# Patient Record
Sex: FEMALE | Race: WHITE | NOT HISPANIC OR LATINO | Employment: OTHER | URBAN - METROPOLITAN AREA
[De-identification: names, ages, dates, MRNs, and addresses within clinical notes are randomized per-mention and may not be internally consistent; named-entity substitution may affect disease eponyms.]

---

## 2017-02-23 ENCOUNTER — ALLSCRIPTS OFFICE VISIT (OUTPATIENT)
Dept: OTHER | Facility: OTHER | Age: 82
End: 2017-02-23

## 2017-04-19 ENCOUNTER — GENERIC CONVERSION - ENCOUNTER (OUTPATIENT)
Dept: OTHER | Facility: OTHER | Age: 82
End: 2017-04-19

## 2017-04-20 DIAGNOSIS — I48.91 ATRIAL FIBRILLATION (HCC): ICD-10-CM

## 2017-04-20 DIAGNOSIS — Z79.01 LONG TERM CURRENT USE OF ANTICOAGULANT: ICD-10-CM

## 2017-06-28 ENCOUNTER — APPOINTMENT (EMERGENCY)
Dept: ULTRASOUND IMAGING | Facility: HOSPITAL | Age: 82
End: 2017-06-28
Payer: MEDICARE

## 2017-06-28 ENCOUNTER — HOSPITAL ENCOUNTER (EMERGENCY)
Facility: HOSPITAL | Age: 82
End: 2017-06-28
Attending: EMERGENCY MEDICINE | Admitting: EMERGENCY MEDICINE
Payer: MEDICARE

## 2017-06-28 VITALS
DIASTOLIC BLOOD PRESSURE: 112 MMHG | OXYGEN SATURATION: 96 % | HEART RATE: 94 BPM | WEIGHT: 113.76 LBS | HEIGHT: 60 IN | BODY MASS INDEX: 22.33 KG/M2 | TEMPERATURE: 97 F | SYSTOLIC BLOOD PRESSURE: 194 MMHG | RESPIRATION RATE: 16 BRPM

## 2017-06-28 DIAGNOSIS — I48.91 ATRIAL FIBRILLATION WITH RVR (HCC): ICD-10-CM

## 2017-06-28 DIAGNOSIS — I70.209 SUPERFICIAL FEMORAL ARTERY OCCLUSION (HCC): Primary | ICD-10-CM

## 2017-06-28 LAB
ALBUMIN SERPL BCP-MCNC: 4.1 G/DL (ref 3.5–5)
ALP SERPL-CCNC: 85 U/L (ref 46–116)
ALT SERPL W P-5'-P-CCNC: 14 U/L (ref 12–78)
ANION GAP SERPL CALCULATED.3IONS-SCNC: 11 MMOL/L (ref 4–13)
AST SERPL W P-5'-P-CCNC: 16 U/L (ref 5–45)
BASOPHILS # BLD AUTO: 0.06 THOUSANDS/ΜL (ref 0–0.1)
BASOPHILS NFR BLD AUTO: 1 % (ref 0–1)
BILIRUB SERPL-MCNC: 1.3 MG/DL (ref 0.2–1)
BUN SERPL-MCNC: 18 MG/DL (ref 5–25)
CALCIUM SERPL-MCNC: 9.1 MG/DL (ref 8.3–10.1)
CHLORIDE SERPL-SCNC: 105 MMOL/L (ref 100–108)
CO2 SERPL-SCNC: 27 MMOL/L (ref 21–32)
CREAT SERPL-MCNC: 1.38 MG/DL (ref 0.6–1.3)
EOSINOPHIL # BLD AUTO: 0.08 THOUSAND/ΜL (ref 0–0.61)
EOSINOPHIL NFR BLD AUTO: 1 % (ref 0–6)
ERYTHROCYTE [DISTWIDTH] IN BLOOD BY AUTOMATED COUNT: 13.2 % (ref 11.6–15.1)
GFR SERPL CREATININE-BSD FRML MDRD: 35.8 ML/MIN/1.73SQ M
GLUCOSE SERPL-MCNC: 93 MG/DL (ref 65–140)
HCT VFR BLD AUTO: 42.4 % (ref 34.8–46.1)
HGB BLD-MCNC: 14.1 G/DL (ref 11.5–15.4)
INR PPP: 1.06 (ref 0.86–1.16)
LYMPHOCYTES # BLD AUTO: 0.95 THOUSANDS/ΜL (ref 0.6–4.47)
LYMPHOCYTES NFR BLD AUTO: 12 % (ref 14–44)
MCH RBC QN AUTO: 29 PG (ref 26.8–34.3)
MCHC RBC AUTO-ENTMCNC: 33.3 G/DL (ref 31.4–37.4)
MCV RBC AUTO: 87 FL (ref 82–98)
MONOCYTES # BLD AUTO: 0.48 THOUSAND/ΜL (ref 0.17–1.22)
MONOCYTES NFR BLD AUTO: 6 % (ref 4–12)
NEUTROPHILS # BLD AUTO: 6.31 THOUSANDS/ΜL (ref 1.85–7.62)
NEUTS SEG NFR BLD AUTO: 80 % (ref 43–75)
NRBC BLD AUTO-RTO: 0 /100 WBCS
PLATELET # BLD AUTO: 248 THOUSANDS/UL (ref 149–390)
PMV BLD AUTO: 9.9 FL (ref 8.9–12.7)
POTASSIUM SERPL-SCNC: 3.9 MMOL/L (ref 3.5–5.3)
PROT SERPL-MCNC: 6.8 G/DL (ref 6.4–8.2)
PROTHROMBIN TIME: 14 SECONDS (ref 12.1–14.4)
RBC # BLD AUTO: 4.86 MILLION/UL (ref 3.81–5.12)
SODIUM SERPL-SCNC: 143 MMOL/L (ref 136–145)
WBC # BLD AUTO: 7.9 THOUSAND/UL (ref 4.31–10.16)

## 2017-06-28 PROCEDURE — 93923 UPR/LXTR ART STDY 3+ LVLS: CPT

## 2017-06-28 PROCEDURE — 80053 COMPREHEN METABOLIC PANEL: CPT | Performed by: PHYSICIAN ASSISTANT

## 2017-06-28 PROCEDURE — 96374 THER/PROPH/DIAG INJ IV PUSH: CPT

## 2017-06-28 PROCEDURE — 93005 ELECTROCARDIOGRAM TRACING: CPT | Performed by: PHYSICIAN ASSISTANT

## 2017-06-28 PROCEDURE — 85025 COMPLETE CBC W/AUTO DIFF WBC: CPT | Performed by: PHYSICIAN ASSISTANT

## 2017-06-28 PROCEDURE — 85610 PROTHROMBIN TIME: CPT | Performed by: PHYSICIAN ASSISTANT

## 2017-06-28 PROCEDURE — 93925 LOWER EXTREMITY STUDY: CPT

## 2017-06-28 PROCEDURE — 36415 COLL VENOUS BLD VENIPUNCTURE: CPT | Performed by: PHYSICIAN ASSISTANT

## 2017-06-28 PROCEDURE — 99285 EMERGENCY DEPT VISIT HI MDM: CPT

## 2017-06-28 RX ORDER — VALSARTAN 320 MG/1
TABLET ORAL
COMMUNITY
End: 2018-06-22 | Stop reason: HOSPADM

## 2017-06-28 RX ORDER — LORAZEPAM 1 MG/1
2 TABLET ORAL ONCE
Status: COMPLETED | OUTPATIENT
Start: 2017-06-28 | End: 2017-06-28

## 2017-06-28 RX ORDER — AMLODIPINE BESYLATE 5 MG/1
TABLET ORAL
COMMUNITY
End: 2017-07-03 | Stop reason: HOSPADM

## 2017-06-28 RX ORDER — ATENOLOL 50 MG/1
TABLET ORAL
COMMUNITY
Start: 2017-06-07 | End: 2018-06-22 | Stop reason: HOSPADM

## 2017-06-28 RX ORDER — WARFARIN SODIUM 3 MG/1
TABLET ORAL
COMMUNITY
Start: 2017-02-23 | End: 2017-07-03 | Stop reason: HOSPADM

## 2017-06-28 RX ADMIN — LORAZEPAM 1 MG: 1 TABLET ORAL at 15:40

## 2017-06-28 RX ADMIN — METOPROLOL TARTRATE 5 MG: 5 INJECTION, SOLUTION INTRAVENOUS at 16:26

## 2017-06-29 ENCOUNTER — APPOINTMENT (INPATIENT)
Dept: RADIOLOGY | Facility: HOSPITAL | Age: 82
DRG: 271 | End: 2017-06-29
Attending: SURGERY
Payer: MEDICARE

## 2017-06-29 ENCOUNTER — ANESTHESIA (INPATIENT)
Dept: RADIOLOGY | Facility: HOSPITAL | Age: 82
DRG: 271 | End: 2017-06-29
Payer: MEDICARE

## 2017-06-29 ENCOUNTER — HOSPITAL ENCOUNTER (INPATIENT)
Facility: HOSPITAL | Age: 82
LOS: 4 days | Discharge: NURSING, REHAB OR LONG-TERM CARE | DRG: 271 | End: 2017-07-03
Attending: INTERNAL MEDICINE | Admitting: HOSPITALIST
Payer: MEDICARE

## 2017-06-29 ENCOUNTER — ANESTHESIA EVENT (INPATIENT)
Dept: RADIOLOGY | Facility: HOSPITAL | Age: 82
DRG: 271 | End: 2017-06-29
Payer: MEDICARE

## 2017-06-29 DIAGNOSIS — G30.9 DEMENTIA, ALZHEIMER'S, WITH BEHAVIOR DISTURBANCE (HCC): ICD-10-CM

## 2017-06-29 DIAGNOSIS — F02.81 DEMENTIA, ALZHEIMER'S, WITH BEHAVIOR DISTURBANCE (HCC): ICD-10-CM

## 2017-06-29 DIAGNOSIS — I70.209 OCCLUSION OF ARTERY OF LOWER EXTREMITY (HCC): Primary | ICD-10-CM

## 2017-06-29 PROBLEM — I70.229 CRITICAL LOWER LIMB ISCHEMIA (HCC): Status: ACTIVE | Noted: 2017-06-29

## 2017-06-29 PROBLEM — R53.81 PHYSICAL DECONDITIONING: Status: ACTIVE | Noted: 2017-06-29

## 2017-06-29 PROBLEM — I48.91 ATRIAL FIBRILLATION (HCC): Status: ACTIVE | Noted: 2017-06-29

## 2017-06-29 PROBLEM — H91.90 HOH (HARD OF HEARING): Status: ACTIVE | Noted: 2017-06-29

## 2017-06-29 PROBLEM — N17.9 AKI (ACUTE KIDNEY INJURY) (HCC): Status: ACTIVE | Noted: 2017-06-29

## 2017-06-29 PROBLEM — R41.0 DELIRIUM: Status: ACTIVE | Noted: 2017-06-29

## 2017-06-29 PROBLEM — I10 ACCELERATED HYPERTENSION: Status: ACTIVE | Noted: 2017-06-29

## 2017-06-29 PROBLEM — I70.90 ARTERIAL OCCLUSION: Status: ACTIVE | Noted: 2017-06-29

## 2017-06-29 PROBLEM — Z91.19 NONCOMPLIANCE WITH TREATMENT: Status: ACTIVE | Noted: 2017-06-29
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
## 2017-06-29 LAB
ANION GAP SERPL CALCULATED.3IONS-SCNC: 7 MMOL/L (ref 4–13)
APTT PPP: 28 SECONDS (ref 23–35)
APTT PPP: 33 SECONDS (ref 23–35)
APTT PPP: 64 SECONDS (ref 23–35)
APTT PPP: 68 SECONDS (ref 23–35)
ATRIAL RATE: 170 BPM
BUN SERPL-MCNC: 18 MG/DL (ref 5–25)
CALCIUM SERPL-MCNC: 8.5 MG/DL (ref 8.3–10.1)
CHLORIDE SERPL-SCNC: 107 MMOL/L (ref 100–108)
CO2 SERPL-SCNC: 28 MMOL/L (ref 21–32)
CREAT SERPL-MCNC: 1.13 MG/DL (ref 0.6–1.3)
ERYTHROCYTE [DISTWIDTH] IN BLOOD BY AUTOMATED COUNT: 13.5 % (ref 11.6–15.1)
GFR SERPL CREATININE-BSD FRML MDRD: 45.1 ML/MIN/1.73SQ M
GLUCOSE SERPL-MCNC: 93 MG/DL (ref 65–140)
HCT VFR BLD AUTO: 40.9 % (ref 34.8–46.1)
HGB BLD-MCNC: 13.6 G/DL (ref 11.5–15.4)
INR PPP: 1.16 (ref 0.86–1.16)
MCH RBC QN AUTO: 29.2 PG (ref 26.8–34.3)
MCHC RBC AUTO-ENTMCNC: 33.3 G/DL (ref 31.4–37.4)
MCV RBC AUTO: 88 FL (ref 82–98)
PLATELET # BLD AUTO: 209 THOUSANDS/UL (ref 149–390)
PMV BLD AUTO: 10.4 FL (ref 8.9–12.7)
POTASSIUM SERPL-SCNC: 3.3 MMOL/L (ref 3.5–5.3)
PROTHROMBIN TIME: 14.8 SECONDS (ref 12.1–14.4)
QRS AXIS: -35 DEGREES
QRSD INTERVAL: 76 MS
QT INTERVAL: 354 MS
QTC INTERVAL: 485 MS
RBC # BLD AUTO: 4.65 MILLION/UL (ref 3.81–5.12)
SODIUM SERPL-SCNC: 142 MMOL/L (ref 136–145)
T WAVE AXIS: 48 DEGREES
VENTRICULAR RATE: 113 BPM
WBC # BLD AUTO: 7.13 THOUSAND/UL (ref 4.31–10.16)

## 2017-06-29 PROCEDURE — 37224 HB FEM/POPL REVAS W/TLA: CPT

## 2017-06-29 PROCEDURE — B41CYZZ FLUOROSCOPY OF PELVIC ARTERIES USING OTHER CONTRAST: ICD-10-PCS | Performed by: RADIOLOGY

## 2017-06-29 PROCEDURE — C1769 GUIDE WIRE: HCPCS

## 2017-06-29 PROCEDURE — 85730 THROMBOPLASTIN TIME PARTIAL: CPT | Performed by: SURGERY

## 2017-06-29 PROCEDURE — 75625 CONTRAST EXAM ABDOMINL AORTA: CPT

## 2017-06-29 PROCEDURE — 047L3ZZ DILATION OF LEFT FEMORAL ARTERY, PERCUTANEOUS APPROACH: ICD-10-PCS | Performed by: RADIOLOGY

## 2017-06-29 PROCEDURE — 99152 MOD SED SAME PHYS/QHP 5/>YRS: CPT

## 2017-06-29 PROCEDURE — 99153 MOD SED SAME PHYS/QHP EA: CPT

## 2017-06-29 PROCEDURE — C1894 INTRO/SHEATH, NON-LASER: HCPCS

## 2017-06-29 PROCEDURE — 85610 PROTHROMBIN TIME: CPT | Performed by: SURGERY

## 2017-06-29 PROCEDURE — 37184 PRIM ART M-THRMBC 1ST VSL: CPT

## 2017-06-29 PROCEDURE — 75710 ARTERY X-RAYS ARM/LEG: CPT

## 2017-06-29 PROCEDURE — 85027 COMPLETE CBC AUTOMATED: CPT | Performed by: SURGERY

## 2017-06-29 PROCEDURE — C1884 EMBOLIZATION PROTECT SYST: HCPCS

## 2017-06-29 PROCEDURE — 80048 BASIC METABOLIC PNL TOTAL CA: CPT | Performed by: INTERNAL MEDICINE

## 2017-06-29 PROCEDURE — B410YZZ FLUOROSCOPY OF ABDOMINAL AORTA USING OTHER CONTRAST: ICD-10-PCS | Performed by: RADIOLOGY

## 2017-06-29 PROCEDURE — 047N3ZZ DILATION OF LEFT POPLITEAL ARTERY, PERCUTANEOUS APPROACH: ICD-10-PCS | Performed by: RADIOLOGY

## 2017-06-29 PROCEDURE — 04CN3ZZ EXTIRPATION OF MATTER FROM LEFT POPLITEAL ARTERY, PERCUTANEOUS APPROACH: ICD-10-PCS | Performed by: RADIOLOGY

## 2017-06-29 PROCEDURE — C1725 CATH, TRANSLUMIN NON-LASER: HCPCS

## 2017-06-29 PROCEDURE — C1757 CATH, THROMBECTOMY/EMBOLECT: HCPCS

## 2017-06-29 PROCEDURE — B41GYZZ FLUOROSCOPY OF LEFT LOWER EXTREMITY ARTERIES USING OTHER CONTRAST: ICD-10-PCS | Performed by: RADIOLOGY

## 2017-06-29 PROCEDURE — 04CL3ZZ EXTIRPATION OF MATTER FROM LEFT FEMORAL ARTERY, PERCUTANEOUS APPROACH: ICD-10-PCS | Performed by: RADIOLOGY

## 2017-06-29 RX ORDER — ACETAMINOPHEN 325 MG/1
650 TABLET ORAL EVERY 6 HOURS PRN
Status: DISCONTINUED | OUTPATIENT
Start: 2017-06-29 | End: 2017-06-29

## 2017-06-29 RX ORDER — OXYCODONE HYDROCHLORIDE 5 MG/1
5 TABLET ORAL EVERY 6 HOURS PRN
Status: DISCONTINUED | OUTPATIENT
Start: 2017-06-29 | End: 2017-07-03 | Stop reason: HOSPADM

## 2017-06-29 RX ORDER — HALOPERIDOL 5 MG/ML
1 INJECTION INTRAMUSCULAR ONCE
Status: COMPLETED | OUTPATIENT
Start: 2017-06-29 | End: 2017-06-30

## 2017-06-29 RX ORDER — PROPOFOL 10 MG/ML
INJECTION, EMULSION INTRAVENOUS CONTINUOUS PRN
Status: DISCONTINUED | OUTPATIENT
Start: 2017-06-29 | End: 2017-06-29 | Stop reason: SURG

## 2017-06-29 RX ORDER — PROPOFOL 10 MG/ML
INJECTION, EMULSION INTRAVENOUS AS NEEDED
Status: DISCONTINUED | OUTPATIENT
Start: 2017-06-29 | End: 2017-06-29 | Stop reason: SURG

## 2017-06-29 RX ORDER — ACETAMINOPHEN 325 MG/1
975 TABLET ORAL EVERY 8 HOURS SCHEDULED
Status: DISCONTINUED | OUTPATIENT
Start: 2017-06-29 | End: 2017-07-03 | Stop reason: HOSPADM

## 2017-06-29 RX ORDER — HEPARIN SODIUM 1000 [USP'U]/ML
3000 INJECTION, SOLUTION INTRAVENOUS; SUBCUTANEOUS AS NEEDED
Status: DISCONTINUED | OUTPATIENT
Start: 2017-06-29 | End: 2017-07-02

## 2017-06-29 RX ORDER — HEPARIN SODIUM 5000 [USP'U]/ML
5000 INJECTION, SOLUTION INTRAVENOUS; SUBCUTANEOUS EVERY 8 HOURS SCHEDULED
Status: DISCONTINUED | OUTPATIENT
Start: 2017-06-29 | End: 2017-06-29

## 2017-06-29 RX ORDER — POTASSIUM CHLORIDE 14.9 MG/ML
20 INJECTION INTRAVENOUS ONCE
Status: COMPLETED | OUTPATIENT
Start: 2017-06-29 | End: 2017-06-29

## 2017-06-29 RX ORDER — TRAMADOL HYDROCHLORIDE 50 MG/1
50 TABLET ORAL 2 TIMES DAILY PRN
Status: DISCONTINUED | OUTPATIENT
Start: 2017-06-29 | End: 2017-06-29

## 2017-06-29 RX ORDER — LABETALOL HYDROCHLORIDE 5 MG/ML
INJECTION, SOLUTION INTRAVENOUS AS NEEDED
Status: DISCONTINUED | OUTPATIENT
Start: 2017-06-29 | End: 2017-06-29 | Stop reason: SURG

## 2017-06-29 RX ORDER — HALOPERIDOL 5 MG/ML
1 INJECTION INTRAMUSCULAR ONCE
Status: COMPLETED | OUTPATIENT
Start: 2017-06-29 | End: 2017-06-29

## 2017-06-29 RX ORDER — HEPARIN SODIUM 1000 [USP'U]/ML
1500 INJECTION, SOLUTION INTRAVENOUS; SUBCUTANEOUS AS NEEDED
Status: DISCONTINUED | OUTPATIENT
Start: 2017-06-29 | End: 2017-07-02

## 2017-06-29 RX ORDER — HEPARIN SODIUM 10000 [USP'U]/100ML
3-20 INJECTION, SOLUTION INTRAVENOUS
Status: DISCONTINUED | OUTPATIENT
Start: 2017-06-29 | End: 2017-07-02

## 2017-06-29 RX ORDER — OLANZAPINE 10 MG/1
2.5 INJECTION, POWDER, LYOPHILIZED, FOR SOLUTION INTRAMUSCULAR EVERY 8 HOURS PRN
Status: DISCONTINUED | OUTPATIENT
Start: 2017-06-29 | End: 2017-07-03 | Stop reason: HOSPADM

## 2017-06-29 RX ORDER — 0.9 % SODIUM CHLORIDE 0.9 %
VIAL (ML) INJECTION
Status: DISCONTINUED
Start: 2017-06-29 | End: 2017-06-29 | Stop reason: WASHOUT

## 2017-06-29 RX ORDER — SODIUM CHLORIDE 9 MG/ML
125 INJECTION, SOLUTION INTRAVENOUS CONTINUOUS
Status: DISCONTINUED | OUTPATIENT
Start: 2017-06-29 | End: 2017-06-29

## 2017-06-29 RX ORDER — HALOPERIDOL 5 MG/ML
0.5 INJECTION INTRAMUSCULAR ONCE
Status: COMPLETED | OUTPATIENT
Start: 2017-06-29 | End: 2017-06-29

## 2017-06-29 RX ORDER — HALOPERIDOL 5 MG/ML
1 INJECTION INTRAMUSCULAR ONCE
Status: DISCONTINUED | OUTPATIENT
Start: 2017-06-29 | End: 2017-07-03 | Stop reason: HOSPADM

## 2017-06-29 RX ORDER — HYDRALAZINE HYDROCHLORIDE 20 MG/ML
5 INJECTION INTRAMUSCULAR; INTRAVENOUS EVERY 6 HOURS PRN
Status: DISCONTINUED | OUTPATIENT
Start: 2017-06-29 | End: 2017-07-03 | Stop reason: HOSPADM

## 2017-06-29 RX ORDER — SODIUM CHLORIDE 9 MG/ML
75 INJECTION, SOLUTION INTRAVENOUS CONTINUOUS
Status: DISCONTINUED | OUTPATIENT
Start: 2017-06-29 | End: 2017-06-30

## 2017-06-29 RX ORDER — HALOPERIDOL 5 MG/ML
INJECTION INTRAMUSCULAR
Status: COMPLETED
Start: 2017-06-29 | End: 2017-06-29

## 2017-06-29 RX ORDER — DOCUSATE SODIUM 100 MG/1
100 CAPSULE, LIQUID FILLED ORAL 2 TIMES DAILY
Status: DISCONTINUED | OUTPATIENT
Start: 2017-06-29 | End: 2017-07-03 | Stop reason: HOSPADM

## 2017-06-29 RX ORDER — SODIUM CHLORIDE 9 MG/ML
INJECTION, SOLUTION INTRAVENOUS CONTINUOUS PRN
Status: DISCONTINUED | OUTPATIENT
Start: 2017-06-29 | End: 2017-06-29 | Stop reason: SURG

## 2017-06-29 RX ORDER — AMLODIPINE BESYLATE 5 MG/1
5 TABLET ORAL DAILY
Status: DISCONTINUED | OUTPATIENT
Start: 2017-06-29 | End: 2017-06-30

## 2017-06-29 RX ORDER — ATENOLOL 50 MG/1
50 TABLET ORAL DAILY
Status: DISCONTINUED | OUTPATIENT
Start: 2017-06-29 | End: 2017-07-03 | Stop reason: HOSPADM

## 2017-06-29 RX ORDER — ONDANSETRON 2 MG/ML
4 INJECTION INTRAMUSCULAR; INTRAVENOUS EVERY 6 HOURS PRN
Status: DISCONTINUED | OUTPATIENT
Start: 2017-06-29 | End: 2017-07-03 | Stop reason: HOSPADM

## 2017-06-29 RX ORDER — VALSARTAN 160 MG/1
320 TABLET ORAL DAILY
Status: DISCONTINUED | OUTPATIENT
Start: 2017-06-29 | End: 2017-06-29

## 2017-06-29 RX ORDER — SODIUM CHLORIDE 9 MG/ML
75 INJECTION, SOLUTION INTRAVENOUS CONTINUOUS
Status: DISCONTINUED | OUTPATIENT
Start: 2017-06-29 | End: 2017-06-29 | Stop reason: SDUPTHER

## 2017-06-29 RX ORDER — SENNOSIDES 8.6 MG
1 TABLET ORAL DAILY
Status: DISCONTINUED | OUTPATIENT
Start: 2017-06-29 | End: 2017-07-03 | Stop reason: HOSPADM

## 2017-06-29 RX ORDER — LABETALOL HYDROCHLORIDE 5 MG/ML
10 INJECTION, SOLUTION INTRAVENOUS EVERY 4 HOURS PRN
Status: DISCONTINUED | OUTPATIENT
Start: 2017-06-29 | End: 2017-07-03 | Stop reason: HOSPADM

## 2017-06-29 RX ADMIN — LABETALOL HYDROCHLORIDE 10 MG: 5 INJECTION, SOLUTION INTRAVENOUS at 05:05

## 2017-06-29 RX ADMIN — PROPOFOL 50 MG: 10 INJECTION, EMULSION INTRAVENOUS at 11:43

## 2017-06-29 RX ADMIN — LABETALOL HYDROCHLORIDE 10 MG: 5 INJECTION, SOLUTION INTRAVENOUS at 13:09

## 2017-06-29 RX ADMIN — METOPROLOL TARTRATE 5 MG: 5 INJECTION INTRAVENOUS at 16:02

## 2017-06-29 RX ADMIN — HALOPERIDOL LACTATE 1 MG: 5 INJECTION, SOLUTION INTRAMUSCULAR at 04:47

## 2017-06-29 RX ADMIN — SODIUM CHLORIDE 75 ML/HR: 0.9 INJECTION, SOLUTION INTRAVENOUS at 09:21

## 2017-06-29 RX ADMIN — LABETALOL HYDROCHLORIDE 10 MG: 5 INJECTION, SOLUTION INTRAVENOUS at 18:42

## 2017-06-29 RX ADMIN — HALOPERIDOL LACTATE 1 MG: 5 INJECTION, SOLUTION INTRAMUSCULAR at 06:05

## 2017-06-29 RX ADMIN — LABETALOL HYDROCHLORIDE 5 MG: 5 INJECTION, SOLUTION INTRAVENOUS at 13:16

## 2017-06-29 RX ADMIN — LABETALOL HYDROCHLORIDE 2.5 MG: 5 INJECTION, SOLUTION INTRAVENOUS at 12:35

## 2017-06-29 RX ADMIN — METOPROLOL TARTRATE 5 MG: 5 INJECTION INTRAVENOUS at 07:21

## 2017-06-29 RX ADMIN — PROPOFOL 40 MCG/KG/MIN: 10 INJECTION, EMULSION INTRAVENOUS at 11:43

## 2017-06-29 RX ADMIN — WATER: 1 INJECTION INTRAMUSCULAR; INTRAVENOUS; SUBCUTANEOUS at 19:55

## 2017-06-29 RX ADMIN — LABETALOL HYDROCHLORIDE 5 MG: 5 INJECTION, SOLUTION INTRAVENOUS at 12:49

## 2017-06-29 RX ADMIN — SODIUM CHLORIDE 125 ML/HR: 0.9 INJECTION, SOLUTION INTRAVENOUS at 02:51

## 2017-06-29 RX ADMIN — HALOPERIDOL LACTATE 0.5 MG: 5 INJECTION, SOLUTION INTRAMUSCULAR at 04:14

## 2017-06-29 RX ADMIN — HEPARIN SODIUM 3000 UNITS: 1000 INJECTION, SOLUTION INTRAVENOUS; SUBCUTANEOUS at 09:38

## 2017-06-29 RX ADMIN — LABETALOL HYDROCHLORIDE 10 MG: 5 INJECTION, SOLUTION INTRAVENOUS at 01:16

## 2017-06-29 RX ADMIN — SODIUM CHLORIDE 75 ML/HR: 0.9 INJECTION, SOLUTION INTRAVENOUS at 22:59

## 2017-06-29 RX ADMIN — HYDRALAZINE HYDROCHLORIDE 5 MG: 20 INJECTION INTRAMUSCULAR; INTRAVENOUS at 14:45

## 2017-06-29 RX ADMIN — ACETAMINOPHEN 975 MG: 325 TABLET, FILM COATED ORAL at 22:06

## 2017-06-29 RX ADMIN — SODIUM CHLORIDE: 0.9 INJECTION, SOLUTION INTRAVENOUS at 11:50

## 2017-06-29 RX ADMIN — AMLODIPINE BESYLATE 5 MG: 5 TABLET ORAL at 09:04

## 2017-06-29 RX ADMIN — OLANZAPINE 2.5 MG: 10 INJECTION, POWDER, LYOPHILIZED, FOR SOLUTION INTRAMUSCULAR at 19:09

## 2017-06-29 RX ADMIN — POTASSIUM CHLORIDE 20 MEQ: 200 INJECTION, SOLUTION INTRAVENOUS at 09:20

## 2017-06-29 RX ADMIN — LABETALOL HYDROCHLORIDE 10 MG: 5 INJECTION, SOLUTION INTRAVENOUS at 12:11

## 2017-06-29 RX ADMIN — HALOPERIDOL 1 MG: 5 INJECTION INTRAMUSCULAR at 06:05

## 2017-06-29 RX ADMIN — HALOPERIDOL 1 MG: 5 INJECTION INTRAMUSCULAR at 04:47

## 2017-06-29 RX ADMIN — LABETALOL HYDROCHLORIDE 5 MG: 5 INJECTION, SOLUTION INTRAVENOUS at 12:00

## 2017-06-29 RX ADMIN — LABETALOL HYDROCHLORIDE 5 MG: 5 INJECTION, SOLUTION INTRAVENOUS at 12:57

## 2017-06-29 RX ADMIN — ATENOLOL 50 MG: 50 TABLET ORAL at 09:04

## 2017-06-29 RX ADMIN — DOCUSATE SODIUM 100 MG: 100 CAPSULE, LIQUID FILLED ORAL at 09:04

## 2017-06-29 RX ADMIN — IODIXANOL 55 ML: 320 INJECTION, SOLUTION INTRAVASCULAR at 14:51

## 2017-06-29 RX ADMIN — HEPARIN SODIUM 12 UNITS/KG/HR: 10000 INJECTION, SOLUTION INTRAVENOUS at 02:28

## 2017-06-29 RX ADMIN — SODIUM CHLORIDE 75 ML/HR: 0.9 INJECTION, SOLUTION INTRAVENOUS at 01:37

## 2017-06-30 ENCOUNTER — APPOINTMENT (INPATIENT)
Dept: PHYSICAL THERAPY | Facility: HOSPITAL | Age: 82
DRG: 271 | End: 2017-06-30
Payer: MEDICARE

## 2017-06-30 LAB
ALBUMIN SERPL BCP-MCNC: 3.7 G/DL (ref 3.5–5)
ALP SERPL-CCNC: 82 U/L (ref 46–116)
ALT SERPL W P-5'-P-CCNC: 22 U/L (ref 12–78)
ANION GAP SERPL CALCULATED.3IONS-SCNC: 11 MMOL/L (ref 4–13)
APTT PPP: 62 SECONDS (ref 23–35)
AST SERPL W P-5'-P-CCNC: 44 U/L (ref 5–45)
BILIRUB DIRECT SERPL-MCNC: 0.61 MG/DL (ref 0–0.2)
BILIRUB SERPL-MCNC: 1.61 MG/DL (ref 0.2–1)
BUN SERPL-MCNC: 16 MG/DL (ref 5–25)
CALCIUM SERPL-MCNC: 8.7 MG/DL (ref 8.3–10.1)
CHLORIDE SERPL-SCNC: 109 MMOL/L (ref 100–108)
CO2 SERPL-SCNC: 23 MMOL/L (ref 21–32)
CREAT SERPL-MCNC: 1.18 MG/DL (ref 0.6–1.3)
FOLATE SERPL-MCNC: 16.3 NG/ML (ref 3.1–17.5)
GFR SERPL CREATININE-BSD FRML MDRD: 42.9 ML/MIN/1.73SQ M
GLUCOSE SERPL-MCNC: 105 MG/DL (ref 65–140)
INR PPP: 1.11 (ref 0.86–1.16)
POTASSIUM SERPL-SCNC: 3.9 MMOL/L (ref 3.5–5.3)
PROT SERPL-MCNC: 6.7 G/DL (ref 6.4–8.2)
PROTHROMBIN TIME: 14.3 SECONDS (ref 12.1–14.4)
SODIUM SERPL-SCNC: 143 MMOL/L (ref 136–145)
TSH SERPL DL<=0.05 MIU/L-ACNC: 2.25 UIU/ML (ref 0.36–3.74)
VIT B12 SERPL-MCNC: 299 PG/ML (ref 100–900)

## 2017-06-30 PROCEDURE — 84443 ASSAY THYROID STIM HORMONE: CPT | Performed by: PHYSICIAN ASSISTANT

## 2017-06-30 PROCEDURE — 97167 OT EVAL HIGH COMPLEX 60 MIN: CPT

## 2017-06-30 PROCEDURE — 82607 VITAMIN B-12: CPT | Performed by: PHYSICIAN ASSISTANT

## 2017-06-30 PROCEDURE — 82746 ASSAY OF FOLIC ACID SERUM: CPT | Performed by: PHYSICIAN ASSISTANT

## 2017-06-30 PROCEDURE — 80076 HEPATIC FUNCTION PANEL: CPT | Performed by: PHYSICIAN ASSISTANT

## 2017-06-30 PROCEDURE — 80048 BASIC METABOLIC PNL TOTAL CA: CPT | Performed by: INTERNAL MEDICINE

## 2017-06-30 PROCEDURE — 97163 PT EVAL HIGH COMPLEX 45 MIN: CPT

## 2017-06-30 PROCEDURE — 85610 PROTHROMBIN TIME: CPT | Performed by: HOSPITALIST

## 2017-06-30 PROCEDURE — G8979 MOBILITY GOAL STATUS: HCPCS

## 2017-06-30 PROCEDURE — 85730 THROMBOPLASTIN TIME PARTIAL: CPT | Performed by: SURGERY

## 2017-06-30 PROCEDURE — G8978 MOBILITY CURRENT STATUS: HCPCS

## 2017-06-30 PROCEDURE — G8987 SELF CARE CURRENT STATUS: HCPCS

## 2017-06-30 PROCEDURE — G8988 SELF CARE GOAL STATUS: HCPCS

## 2017-06-30 RX ORDER — WARFARIN SODIUM 5 MG/1
5 TABLET ORAL
Status: DISCONTINUED | OUTPATIENT
Start: 2017-06-30 | End: 2017-07-03

## 2017-06-30 RX ORDER — AMLODIPINE BESYLATE 10 MG/1
10 TABLET ORAL DAILY
Status: DISCONTINUED | OUTPATIENT
Start: 2017-07-01 | End: 2017-07-03 | Stop reason: HOSPADM

## 2017-06-30 RX ADMIN — AMLODIPINE BESYLATE 5 MG: 5 TABLET ORAL at 07:53

## 2017-06-30 RX ADMIN — HEPARIN SODIUM 16 UNITS/KG/HR: 10000 INJECTION, SOLUTION INTRAVENOUS at 03:26

## 2017-06-30 RX ADMIN — HYDRALAZINE HYDROCHLORIDE 5 MG: 20 INJECTION INTRAMUSCULAR; INTRAVENOUS at 06:16

## 2017-06-30 RX ADMIN — HALOPERIDOL LACTATE 1 MG: 5 INJECTION, SOLUTION INTRAMUSCULAR at 00:30

## 2017-06-30 RX ADMIN — WARFARIN SODIUM 5 MG: 5 TABLET ORAL at 17:55

## 2017-06-30 RX ADMIN — SODIUM CHLORIDE 500 ML: 0.9 INJECTION, SOLUTION INTRAVENOUS at 20:08

## 2017-06-30 RX ADMIN — ACETAMINOPHEN 975 MG: 325 TABLET, FILM COATED ORAL at 07:54

## 2017-06-30 RX ADMIN — ATENOLOL 50 MG: 50 TABLET ORAL at 07:53

## 2017-06-30 RX ADMIN — DOCUSATE SODIUM 100 MG: 100 CAPSULE, LIQUID FILLED ORAL at 07:53

## 2017-06-30 RX ADMIN — SENNOSIDES 8.6 MG: 8.6 TABLET, FILM COATED ORAL at 07:54

## 2017-07-01 LAB — APTT PPP: 74 SECONDS (ref 23–35)

## 2017-07-01 PROCEDURE — 85730 THROMBOPLASTIN TIME PARTIAL: CPT | Performed by: INTERNAL MEDICINE

## 2017-07-01 PROCEDURE — 97532 HB COGNITIVE SKILLS DEVELOPMENT: CPT

## 2017-07-01 RX ORDER — CHOLECALCIFEROL (VITAMIN D3) 125 MCG
500 CAPSULE ORAL DAILY
Status: DISCONTINUED | OUTPATIENT
Start: 2017-07-01 | End: 2017-07-03 | Stop reason: HOSPADM

## 2017-07-01 RX ADMIN — CYANOCOBALAMIN TAB 500 MCG 500 MCG: 500 TAB at 10:57

## 2017-07-01 RX ADMIN — AMLODIPINE BESYLATE 10 MG: 10 TABLET ORAL at 08:36

## 2017-07-01 RX ADMIN — ATENOLOL 50 MG: 50 TABLET ORAL at 08:36

## 2017-07-01 RX ADMIN — ACETAMINOPHEN 975 MG: 325 TABLET, FILM COATED ORAL at 06:22

## 2017-07-01 RX ADMIN — HEPARIN SODIUM 16 UNITS/KG/HR: 10000 INJECTION, SOLUTION INTRAVENOUS at 11:03

## 2017-07-01 RX ADMIN — WARFARIN SODIUM 5 MG: 5 TABLET ORAL at 19:17

## 2017-07-01 RX ADMIN — HYDRALAZINE HYDROCHLORIDE 5 MG: 20 INJECTION INTRAMUSCULAR; INTRAVENOUS at 06:27

## 2017-07-02 LAB
APTT PPP: 84 SECONDS (ref 23–35)
BASOPHILS # BLD AUTO: 0.02 THOUSANDS/ΜL (ref 0–0.1)
BASOPHILS NFR BLD AUTO: 0 % (ref 0–1)
EOSINOPHIL # BLD AUTO: 0.16 THOUSAND/ΜL (ref 0–0.61)
EOSINOPHIL NFR BLD AUTO: 3 % (ref 0–6)
ERYTHROCYTE [DISTWIDTH] IN BLOOD BY AUTOMATED COUNT: 14.2 % (ref 11.6–15.1)
HCT VFR BLD AUTO: 34.2 % (ref 34.8–46.1)
HGB BLD-MCNC: 11.3 G/DL (ref 11.5–15.4)
INR PPP: 1.9 (ref 0.86–1.16)
LYMPHOCYTES # BLD AUTO: 0.77 THOUSANDS/ΜL (ref 0.6–4.47)
LYMPHOCYTES NFR BLD AUTO: 13 % (ref 14–44)
MCH RBC QN AUTO: 29.4 PG (ref 26.8–34.3)
MCHC RBC AUTO-ENTMCNC: 33 G/DL (ref 31.4–37.4)
MCV RBC AUTO: 89 FL (ref 82–98)
MONOCYTES # BLD AUTO: 0.55 THOUSAND/ΜL (ref 0.17–1.22)
MONOCYTES NFR BLD AUTO: 9 % (ref 4–12)
NEUTROPHILS # BLD AUTO: 4.51 THOUSANDS/ΜL (ref 1.85–7.62)
NEUTS SEG NFR BLD AUTO: 75 % (ref 43–75)
NRBC BLD AUTO-RTO: 0 /100 WBCS
PLATELET # BLD AUTO: 195 THOUSANDS/UL (ref 149–390)
PMV BLD AUTO: 10.5 FL (ref 8.9–12.7)
PROTHROMBIN TIME: 22 SECONDS (ref 12.1–14.4)
RBC # BLD AUTO: 3.85 MILLION/UL (ref 3.81–5.12)
WBC # BLD AUTO: 6.03 THOUSAND/UL (ref 4.31–10.16)

## 2017-07-02 PROCEDURE — 97116 GAIT TRAINING THERAPY: CPT

## 2017-07-02 PROCEDURE — 92610 EVALUATE SWALLOWING FUNCTION: CPT

## 2017-07-02 PROCEDURE — 85610 PROTHROMBIN TIME: CPT | Performed by: PHYSICIAN ASSISTANT

## 2017-07-02 PROCEDURE — 85025 COMPLETE CBC W/AUTO DIFF WBC: CPT | Performed by: PHYSICIAN ASSISTANT

## 2017-07-02 PROCEDURE — 85730 THROMBOPLASTIN TIME PARTIAL: CPT | Performed by: INTERNAL MEDICINE

## 2017-07-02 RX ORDER — OLANZAPINE 10 MG/1
2.5 INJECTION, POWDER, LYOPHILIZED, FOR SOLUTION INTRAMUSCULAR ONCE
Status: COMPLETED | OUTPATIENT
Start: 2017-07-02 | End: 2017-07-02

## 2017-07-02 RX ADMIN — AMLODIPINE BESYLATE 10 MG: 10 TABLET ORAL at 08:36

## 2017-07-02 RX ADMIN — DOCUSATE SODIUM 100 MG: 100 CAPSULE, LIQUID FILLED ORAL at 17:17

## 2017-07-02 RX ADMIN — ATENOLOL 50 MG: 50 TABLET ORAL at 08:36

## 2017-07-02 RX ADMIN — OLANZAPINE 2.5 MG: 10 INJECTION, POWDER, LYOPHILIZED, FOR SOLUTION INTRAMUSCULAR at 15:49

## 2017-07-02 RX ADMIN — WATER 10 ML: 1 INJECTION INTRAMUSCULAR; INTRAVENOUS; SUBCUTANEOUS at 22:11

## 2017-07-02 RX ADMIN — HEPARIN SODIUM 16 UNITS/KG/HR: 10000 INJECTION, SOLUTION INTRAVENOUS at 05:18

## 2017-07-02 RX ADMIN — ACETAMINOPHEN 975 MG: 325 TABLET, FILM COATED ORAL at 13:38

## 2017-07-02 RX ADMIN — CYANOCOBALAMIN TAB 500 MCG 500 MCG: 500 TAB at 08:36

## 2017-07-02 RX ADMIN — ACETAMINOPHEN 975 MG: 325 TABLET, FILM COATED ORAL at 21:01

## 2017-07-02 RX ADMIN — OLANZAPINE 2.5 MG: 10 INJECTION, POWDER, LYOPHILIZED, FOR SOLUTION INTRAMUSCULAR at 22:11

## 2017-07-02 RX ADMIN — WARFARIN SODIUM 5 MG: 5 TABLET ORAL at 17:17

## 2017-07-02 RX ADMIN — OXYCODONE HYDROCHLORIDE 5 MG: 5 TABLET ORAL at 17:17

## 2017-07-03 ENCOUNTER — GENERIC CONVERSION - ENCOUNTER (OUTPATIENT)
Dept: OTHER | Facility: OTHER | Age: 82
End: 2017-07-03

## 2017-07-03 VITALS
OXYGEN SATURATION: 98 % | WEIGHT: 116.4 LBS | SYSTOLIC BLOOD PRESSURE: 154 MMHG | HEART RATE: 89 BPM | TEMPERATURE: 97.9 F | HEIGHT: 62 IN | BODY MASS INDEX: 21.42 KG/M2 | DIASTOLIC BLOOD PRESSURE: 81 MMHG | RESPIRATION RATE: 20 BRPM

## 2017-07-03 PROBLEM — I10 ACCELERATED HYPERTENSION: Status: RESOLVED | Noted: 2017-06-29 | Resolved: 2017-07-03

## 2017-07-03 PROBLEM — N17.9 AKI (ACUTE KIDNEY INJURY) (HCC): Status: RESOLVED | Noted: 2017-06-29 | Resolved: 2017-07-03

## 2017-07-03 PROBLEM — I70.90 ARTERIAL OCCLUSION: Status: RESOLVED | Noted: 2017-06-29 | Resolved: 2017-07-03

## 2017-07-03 PROBLEM — I70.229 CRITICAL LOWER LIMB ISCHEMIA (HCC): Status: RESOLVED | Noted: 2017-06-29 | Resolved: 2017-07-03

## 2017-07-03 PROBLEM — R41.0 DELIRIUM: Status: RESOLVED | Noted: 2017-06-29 | Resolved: 2017-07-03

## 2017-07-03 PROBLEM — G30.9 ALZHEIMER'S DEMENTIA WITH BEHAVIORAL DISTURBANCE (HCC): Chronic | Status: ACTIVE | Noted: 2017-06-29

## 2017-07-03 PROBLEM — F02.81 ALZHEIMER'S DEMENTIA WITH BEHAVIORAL DISTURBANCE (HCC): Chronic | Status: ACTIVE | Noted: 2017-06-29

## 2017-07-03 LAB
APTT PPP: 33 SECONDS (ref 23–35)
INR PPP: 2.64 (ref 0.86–1.16)
PROTHROMBIN TIME: 28.5 SECONDS (ref 12.1–14.4)

## 2017-07-03 PROCEDURE — 85730 THROMBOPLASTIN TIME PARTIAL: CPT | Performed by: INTERNAL MEDICINE

## 2017-07-03 PROCEDURE — 97530 THERAPEUTIC ACTIVITIES: CPT

## 2017-07-03 PROCEDURE — 93005 ELECTROCARDIOGRAM TRACING: CPT | Performed by: PHYSICIAN ASSISTANT

## 2017-07-03 PROCEDURE — 97532 HB COGNITIVE SKILLS DEVELOPMENT: CPT

## 2017-07-03 PROCEDURE — 85610 PROTHROMBIN TIME: CPT | Performed by: INTERNAL MEDICINE

## 2017-07-03 RX ORDER — WARFARIN SODIUM 1 MG/1
2 TABLET ORAL
Status: DISCONTINUED | OUTPATIENT
Start: 2017-07-03 | End: 2017-07-03 | Stop reason: HOSPADM

## 2017-07-03 RX ORDER — WARFARIN SODIUM 2 MG/1
TABLET ORAL
Start: 2017-07-03 | End: 2017-08-28

## 2017-07-03 RX ORDER — AMLODIPINE BESYLATE 10 MG/1
10 TABLET ORAL DAILY
Qty: 30 TABLET | Refills: 0
Start: 2017-07-03 | End: 2017-08-28

## 2017-07-03 RX ADMIN — ATENOLOL 50 MG: 50 TABLET ORAL at 08:56

## 2017-07-03 RX ADMIN — WATER 10 ML: 1 INJECTION INTRAMUSCULAR; INTRAVENOUS; SUBCUTANEOUS at 00:14

## 2017-07-03 RX ADMIN — OLANZAPINE 2.5 MG: 10 INJECTION, POWDER, LYOPHILIZED, FOR SOLUTION INTRAMUSCULAR at 00:14

## 2017-07-03 RX ADMIN — ACETAMINOPHEN 975 MG: 325 TABLET, FILM COATED ORAL at 05:36

## 2017-07-03 RX ADMIN — OXYCODONE HYDROCHLORIDE 5 MG: 5 TABLET ORAL at 00:17

## 2017-07-03 RX ADMIN — AMLODIPINE BESYLATE 10 MG: 10 TABLET ORAL at 08:54

## 2017-07-03 RX ADMIN — ACETAMINOPHEN 975 MG: 325 TABLET, FILM COATED ORAL at 15:48

## 2017-07-03 RX ADMIN — DOCUSATE SODIUM 100 MG: 100 CAPSULE, LIQUID FILLED ORAL at 08:56

## 2017-07-03 RX ADMIN — CYANOCOBALAMIN TAB 500 MCG 500 MCG: 500 TAB at 08:56

## 2017-07-03 RX ADMIN — SENNOSIDES 8.6 MG: 8.6 TABLET, FILM COATED ORAL at 08:56

## 2017-07-03 RX ADMIN — WARFARIN SODIUM 2 MG: 1 TABLET ORAL at 17:44

## 2017-07-05 ENCOUNTER — GENERIC CONVERSION - ENCOUNTER (OUTPATIENT)
Dept: OTHER | Facility: OTHER | Age: 82
End: 2017-07-05

## 2017-07-05 LAB
ATRIAL RATE: 104 BPM
QRS AXIS: -33 DEGREES
QRSD INTERVAL: 82 MS
QT INTERVAL: 342 MS
QTC INTERVAL: 462 MS
T WAVE AXIS: 57 DEGREES
VENTRICULAR RATE: 110 BPM

## 2017-07-12 ENCOUNTER — ALLSCRIPTS OFFICE VISIT (OUTPATIENT)
Dept: OTHER | Facility: OTHER | Age: 82
End: 2017-07-12

## 2017-07-20 ENCOUNTER — ALLSCRIPTS OFFICE VISIT (OUTPATIENT)
Dept: OTHER | Facility: OTHER | Age: 82
End: 2017-07-20

## 2017-07-20 DIAGNOSIS — I70.203 ATHEROSCLEROSIS OF NATIVE ARTERY OF BOTH LOWER EXTREMITIES (HCC): ICD-10-CM

## 2017-08-03 ENCOUNTER — ALLSCRIPTS OFFICE VISIT (OUTPATIENT)
Dept: OTHER | Facility: OTHER | Age: 82
End: 2017-08-03

## 2017-08-28 ENCOUNTER — APPOINTMENT (EMERGENCY)
Dept: RADIOLOGY | Facility: HOSPITAL | Age: 82
End: 2017-08-28
Payer: MEDICARE

## 2017-08-28 ENCOUNTER — APPOINTMENT (EMERGENCY)
Dept: CT IMAGING | Facility: HOSPITAL | Age: 82
End: 2017-08-28
Payer: MEDICARE

## 2017-08-28 ENCOUNTER — HOSPITAL ENCOUNTER (EMERGENCY)
Facility: HOSPITAL | Age: 82
Discharge: HOME | End: 2017-08-28
Attending: EMERGENCY MEDICINE | Admitting: EMERGENCY MEDICINE
Payer: MEDICARE

## 2017-08-28 VITALS
OXYGEN SATURATION: 99 % | SYSTOLIC BLOOD PRESSURE: 182 MMHG | RESPIRATION RATE: 18 BRPM | BODY MASS INDEX: 23.93 KG/M2 | WEIGHT: 126.76 LBS | TEMPERATURE: 97.9 F | HEIGHT: 61 IN | DIASTOLIC BLOOD PRESSURE: 88 MMHG | HEART RATE: 86 BPM

## 2017-08-28 DIAGNOSIS — S00.03XA CONTUSION OF OCCIPITAL REGION OF SCALP, INITIAL ENCOUNTER: ICD-10-CM
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
## 2017-08-28 DIAGNOSIS — Z79.01 ANTICOAGULATED: ICD-10-CM

## 2017-08-28 DIAGNOSIS — R51.9 OCCIPITAL HEADACHE: ICD-10-CM

## 2017-08-28 PROCEDURE — 71101 X-RAY EXAM UNILAT RIBS/CHEST: CPT

## 2017-08-28 PROCEDURE — 70450 CT HEAD/BRAIN W/O DYE: CPT

## 2017-08-28 PROCEDURE — 72125 CT NECK SPINE W/O DYE: CPT

## 2017-08-28 PROCEDURE — 99284 EMERGENCY DEPT VISIT MOD MDM: CPT

## 2017-08-28 RX ORDER — QUETIAPINE FUMARATE 25 MG/1
TABLET, FILM COATED ORAL
COMMUNITY
End: 2018-06-22 | Stop reason: HOSPADM

## 2017-08-28 RX ORDER — AMLODIPINE BESYLATE 10 MG/1
TABLET ORAL
COMMUNITY
End: 2018-06-22 | Stop reason: HOSPADM

## 2017-08-28 RX ORDER — LORAZEPAM 0.5 MG/1
0.5 TABLET ORAL EVERY 8 HOURS PRN
COMMUNITY
End: 2018-06-22 | Stop reason: HOSPADM

## 2017-11-19 ENCOUNTER — APPOINTMENT (EMERGENCY)
Dept: CT IMAGING | Facility: HOSPITAL | Age: 82
End: 2017-11-19
Payer: MEDICARE

## 2017-11-19 ENCOUNTER — HOSPITAL ENCOUNTER (EMERGENCY)
Facility: HOSPITAL | Age: 82
Discharge: HOME | End: 2017-11-20
Attending: EMERGENCY MEDICINE | Admitting: EMERGENCY MEDICINE
Payer: MEDICARE

## 2017-11-19 DIAGNOSIS — S00.03XA CONTUSION OF OCCIPITAL REGION OF SCALP, INITIAL ENCOUNTER: ICD-10-CM
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
## 2017-11-19 PROCEDURE — 72125 CT NECK SPINE W/O DYE: CPT

## 2017-11-19 PROCEDURE — 70450 CT HEAD/BRAIN W/O DYE: CPT

## 2017-11-20 VITALS
WEIGHT: 126.98 LBS | HEART RATE: 82 BPM | TEMPERATURE: 97.7 F | DIASTOLIC BLOOD PRESSURE: 72 MMHG | OXYGEN SATURATION: 98 % | SYSTOLIC BLOOD PRESSURE: 169 MMHG | HEIGHT: 61 IN | BODY MASS INDEX: 23.98 KG/M2 | RESPIRATION RATE: 20 BRPM

## 2017-11-20 PROCEDURE — 99284 EMERGENCY DEPT VISIT MOD MDM: CPT
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
## 2017-11-20 NOTE — DISCHARGE INSTRUCTIONS
Fall Prevention for Older Adults   WHAT YOU NEED TO KNOW:   As you age, your muscles weaken and your risk for falls increases  Your risk also increases if you take medicines that make you sleepy or dizzy  You may also be at risk if you have vision or joint problems, have low blood pressure, or are not active  DISCHARGE INSTRUCTIONS:   Call 911 or have someone else call if:   · You have fallen and are unconscious  · You have fallen and cannot move part of your body  Contact your healthcare provider if:   · You have fallen and have pain or a headache  · You have questions or concerns about your condition or care  Fall prevention tips:   · Stay active  Exercise can help strengthen your muscles and improve your balance  Your healthcare provider may recommend water aerobics, walking, or Jayro Chi  He may also recommend physical therapy to improve your coordination  Never start an exercise program without asking your healthcare provider first     · Wear shoes that fit well and have soles that   Wear shoes both inside and outside  Use slippers with good   Avoid shoes with high heels  · Use assistive devices as directed  Your healthcare provider may suggest that you use a cane or walker to help you keep your balance  You may need to have grab bars put in your bathroom near the toilet or in the shower  · Stand or sit up slowly  This may help you keep your balance and prevent falls  · Wear a personal alarm  This is a device that allows you to call 911 if you need help  Ask for more information on personal alarms  · Manage your medical conditions  Keep all appointments with your healthcare providers  Visit your eye doctor as directed  Home safety tips:   · Add items to prevent falls in the bathroom  Put nonslip strips on your bath or shower floor to prevent you from slipping  Use a bath mat if you do not have carpet in the bathroom   This will prevent you from falling when you step out of the bath or shower  Use a shower seat so you do not need to stand while you shower  Sit on the toilet or a chair in your bathroom to dry yourself and put on clothing  This will prevent you from losing your balance from drying or dressing yourself while you are standing  · Keep paths clear  Remove books, shoes, and other objects from walkways and stairs  Place cords for telephones and lamps out of the way so that you do not need to walk over them  Tape them down if you cannot move them  Remove small rugs  If you cannot remove a rug, secure it with double-sided tape  This will prevent you from tripping  · Install bright lights in your home  Use night lights to help light paths to the bathroom or kitchen  Always turn on the light before you start walking  · Keep items you use often on shelves within reach  Do not use a step stool to help you reach an item  · Paint or place reflective tape on the edges of your stairs  This will help you see the stairs better  Follow up with your healthcare provider as directed:  Write down your questions so you remember to ask them during your visits  © 2017 [address] [employer] Information is for End User's use only and may not be sold, redistributed or otherwise used for commercial purposes  All illustrations and images included in CareNotes® are the copyrighted property of [employer] , [employer]  or Leonel Ortega  The above information is an  only  It is not intended as medical advice for individual conditions or treatments  Talk to your doctor, nurse or pharmacist before following any medical regimen to see if it is safe and effective for you  Scalp Contusion in Adults   WHAT YOU NEED TO KNOW:   A scalp contusion is a bruise on your scalp  There is bleeding under the scalp, but the skin is not broken  You may have swelling at the site of the bruise    DISCHARGE INSTRUCTIONS:   Home care:   · Have someone stay with you for 24 to 48 hours after the injury  Give him the signs of serious injury listed below, such as a seizure or trouble moving  You will need immediate care if you develop signs of a serious injury  · Apply ice to your bruise  Ice helps decrease swelling and pain  Ice may also help prevent tissue damage  Use an ice pack, or put crushed ice in a plastic bag  Cover it with a towel and place it on your bruise for 15 to 20 minutes every hour or as directed  Follow up with your healthcare provider as directed:  Write down your questions so you remember to ask them during your visits  Contact your healthcare provider if:   · You have a headache or neck pain that is getting worse  · You are drowsy and confused  · You have trouble staying balanced or walking  · You are irritable for no reason  · You have problems with your vision  · You cannot stop vomiting  Return to the emergency department or have someone call 911 if:   · You have a seizure  · You cannot be awakened  · You are not able to move part of your body  · Your pupils are different sizes  · You have blood or clear fluid coming out of your nose, ears, or mouth  © 2017 [facility] Information is for End User's use only and may not be sold, redistributed or otherwise used for commercial purposes  All illustrations and images included in CareNotes® are the copyrighted property of A D A M , Inc  or Leonel Ortega  The above information is an  only  It is not intended as medical advice for individual conditions or treatments  Talk to your doctor, nurse or pharmacist before following any medical regimen to see if it is safe and effective for you  Head Injury   WHAT YOU NEED TO KNOW:   A head injury is most often caused by a blow to the head  This may occur from a fall, bicycle injury, sports injury, being struck in the head, or a motor vehicle accident     DISCHARGE INSTRUCTIONS:   Call 911 or have someone else call for any of the following:   · You cannot be woken  · You have a seizure  · You stop responding to others or you faint  · You have blurry or double vision  · Your speech becomes slurred or confused  · You have arm or leg weakness, loss of feeling, or new problems with coordination  · Your pupils are larger than usual or one pupil is a different size than the other  · You have blood or clear fluid coming out of your ears or nose  Return to the emergency department if:   · You have repeated or forceful vomiting  · You feel confused  · Your headache gets worse or becomes severe  · You or someone caring for you notices that you are harder to wake than usual   Contact your healthcare provider if:   · Your symptoms last longer than 6 weeks after the injury  · You have questions or concerns about your condition or care  Medicines:   · Acetaminophen  decreases pain  Acetaminophen is available without a doctor's order  Ask how much to take and how often to take it  Follow directions  Acetaminophen can cause liver damage if not taken correctly  · Take your medicine as directed  Contact your healthcare provider if you think your medicine is not helping or if you have side effects  Tell him or her if you are allergic to any medicine  Keep a list of the medicines, vitamins, and herbs you take  Include the amounts, and when and why you take them  Bring the list or the pill bottles to follow-up visits  Carry your medicine list with you in case of an emergency  Self-care:   · Rest  or do quiet activities for 24 to 48 hours  Limit your time watching TV, using the computer, or doing tasks that require a lot of thinking  Slowly return to your normal activities as directed  Do not play sports or do activities that may cause you to get hit in the head  Ask your healthcare provider when you can return to sports       · Apply ice  on your head for 15 to 20 minutes every hour or as directed  Use an ice pack, or put crushed ice in a plastic bag  Cover it with a towel before you apply it to your skin  Ice helps prevent tissue damage and decreases swelling and pain  · Have someone stay with you for 24 hours  or as directed  This person can monitor you for complications and call 356  When you are awake the person should ask you a few questions to see if you are thinking clearly  An example would be to ask your name or your address  Prevent another head injury:   · Wear a helmet that fits properly  Do this when you play sports, or ride a bike, scooter, or skateboard  Helmets help decrease your risk of a serious head injury  Talk to your healthcare provider about other ways you can protect yourself if you play sports  · Wear your seat belt every time you are in a car  This helps to decrease your risk for a head injury if you are in a car accident  Follow up with your healthcare provider as directed:  Write down your questions so you remember to ask them during your visits  © 2017 [facility] [facility] Information is for End User's use only and may not be sold, redistributed or otherwise used for commercial purposes  All illustrations and images included in CareNotes® are the copyrighted property of A D A M , Inc  or Leonel Ortega  The above information is an  only  It is not intended as medical advice for individual conditions or treatments  Talk to your doctor, nurse or pharmacist before following any medical regimen to see if it is safe and effective for you

## 2018-01-12 VITALS
HEART RATE: 62 BPM | WEIGHT: 119.19 LBS | RESPIRATION RATE: 14 BRPM | HEIGHT: 63 IN | SYSTOLIC BLOOD PRESSURE: 136 MMHG | BODY MASS INDEX: 21.12 KG/M2 | DIASTOLIC BLOOD PRESSURE: 80 MMHG

## 2018-01-13 NOTE — MISCELLANEOUS
Provider Comments  Provider Comments:   Patient did not show up for scheduled, confirmed appointment  Nor did she call to cancel or reschedule  [facility]/ [id number]  Multiple missed appts and also on coumadin often misses inr      [facility]   Electronically signed by : Jin Hollins, [address] [place] [address] Drive;  Aug  3 2017 11:53PM EST                       (Author)

## 2018-01-14 VITALS
SYSTOLIC BLOOD PRESSURE: 142 MMHG | WEIGHT: 128.4 LBS | HEIGHT: 63 IN | HEART RATE: 87 BPM | TEMPERATURE: 96.5 F | OXYGEN SATURATION: 100 % | BODY MASS INDEX: 22.75 KG/M2 | DIASTOLIC BLOOD PRESSURE: 88 MMHG

## 2018-01-14 VITALS
WEIGHT: 118 LBS | BODY MASS INDEX: 20.91 KG/M2 | HEART RATE: 74 BPM | OXYGEN SATURATION: 98 % | HEIGHT: 63 IN | SYSTOLIC BLOOD PRESSURE: 140 MMHG | DIASTOLIC BLOOD PRESSURE: 84 MMHG

## 2018-01-15 NOTE — RESULT NOTES
Verified Results  Coumadin Flow Sheet [id number] 08:37AM Pretty Bocanegra     Test Name Result Flag Reference   Recheck INR      EVERYDAY UNTIL FURTHER NOTICE   Current Dose NONE     New Dose NONE     Patient Notified CARLI NOTIFIED     Comments      PATIENT INR TO [facility] ARE HOLDING UNTIL FURTHER NOTICE

## 2018-01-23 NOTE — MISCELLANEOUS
Discussion/Summary  Discussion Summary:   Pt not available for this heriberto  History of Present Illness  [facility]I [facility]ANG [facility] were reviewed  She was hospitalized at and [address] [facility]  The date of admission: 6/29/2017, date of discharge: 7/3/2017  She was discharged to a rehabilitation center  She did not schedule a follow up appointment  She refused a follow up appointment due to being in SNF Rehab  Communication performed and completed by Terri Horner   HPI: Pt is in Rehab according to note  Pt can no be reached for follow up at this time  Active Problems    1  Anxiety (300 00) (F41 9)   2  Atrial flutter (427 32) (I48 92)   3  Cellulitis and abscess of other specified site (682 8) (L03 818,L02 818)   4  Chronic hypertension (401 9) (I10)   5  Dizziness (780 4) (R42)   6  Encounter for special screening examination for genitourinary disorder (V81 6) (Z13 89)   7  Fatigue (780 79) (R53 83)   8  Need for influenza vaccination (V04 81) (Z23)   9  Noncompliance with treatment (V15 81) (Z91 19)   10  Skin problem (709 9) (L98 9)   11  Vertigo (780 4) (R42)    Past Medical History    1  History of Benign hypertension (401 1) (I10)   2  History of atrial fibrillation (V12 59) (Z86 79)   3  History of tinnitus (V12 49) (Z86 69)    Family History  Mother    1  Family history of hypertension (V17 49) (Z82 49)   2  Denied: Family history of mental disorder   3  Denied: Family history of Illicit drug use    Social History    · Never smoked   · Social alcohol use (Z78 9)    Current Meds   1  AmLODIPine Besylate 5 MG Oral Tablet; Take 1 tablet daily; Therapy: (Recorded:20Oct2016) to Recorded   2  Atenolol 50 MG Oral Tablet; take 1 tablet by mouth once daily; Therapy: 58TIB0932 to (Evaluate:28Jnj5551)  Requested for: 17DIX8049; Last   Rx:07Jun2017 Ordered   3  Coumadin 3 MG Oral Tablet; TAKE 1 TABLET DAILY AS DIRECTED;    Therapy: 41GCZ4507 to (Evaluate:87Vsx4032) Requested for: 75Lvq3646; Last   Rx:87Lpc1017 Ordered   4  Diovan 320 MG Oral Tablet Recorded   5  Vitamin D3 1000 UNIT Oral Tablet; Therapy: (Recorded:20Oct2016) to Recorded   6  Warfarin Sodium 3 MG Oral Tablet; TAKE 1 TABLET DAILY AS DIRECTED; Therapy: 81VOG3115 to (Star Copping)  Requested for: 44SPR2432; Last   Rx:16Nov2016 Ordered   7  Warfarin Sodium 3 MG Oral Tablet; Therapy: (Recorded:16Nov2016) to Recorded    Allergies    1   No Known Drug Allergies    Signatures   Electronically signed by : Avel Bess; Dec  3 2017  2:08PM EST                       (Author)    Electronically signed by : Eloisa Vela DO; Dec  4 2017  8:03AM EST                       (Co-author)

## 2018-05-04 ENCOUNTER — HOSPITAL ENCOUNTER (EMERGENCY)
Facility: HOSPITAL | Age: 83
Discharge: NURSING, REHAB OR LONG-TERM CARE | End: 2018-05-04
Attending: EMERGENCY MEDICINE | Admitting: EMERGENCY MEDICINE
Payer: MEDICARE

## 2018-05-04 VITALS
TEMPERATURE: 98.2 F | OXYGEN SATURATION: 98 % | SYSTOLIC BLOOD PRESSURE: 125 MMHG | HEART RATE: 69 BPM | RESPIRATION RATE: 18 BRPM | DIASTOLIC BLOOD PRESSURE: 69 MMHG | WEIGHT: 128.97 LBS | BODY MASS INDEX: 24.37 KG/M2

## 2018-05-04 DIAGNOSIS — F02.81 ALZHEIMER'S DEMENTIA WITH BEHAVIORAL DISTURBANCE, UNSPECIFIED TIMING OF DEMENTIA ONSET (HCC): Primary | ICD-10-CM

## 2018-05-04 DIAGNOSIS — G30.9 ALZHEIMER'S DEMENTIA WITH BEHAVIORAL DISTURBANCE, UNSPECIFIED TIMING OF DEMENTIA ONSET (HCC): Primary | ICD-10-CM

## 2018-05-04 LAB
BILIRUB UR QL STRIP: NEGATIVE
CLARITY UR: NORMAL
COLOR UR: YELLOW
GLUCOSE UR STRIP-MCNC: NEGATIVE MG/DL
HGB UR QL STRIP.AUTO: NEGATIVE
KETONES UR STRIP-MCNC: NEGATIVE MG/DL
LEUKOCYTE ESTERASE UR QL STRIP: NEGATIVE
NITRITE UR QL STRIP: NEGATIVE
PH UR STRIP.AUTO: 6 [PH] (ref 4.5–8)
PROT UR STRIP-MCNC: NEGATIVE MG/DL
SP GR UR STRIP.AUTO: 1.01 (ref 1–1.03)
UROBILINOGEN UR QL STRIP.AUTO: 1 E.U./DL

## 2018-05-04 PROCEDURE — 81003 URINALYSIS AUTO W/O SCOPE: CPT | Performed by: EMERGENCY MEDICINE

## 2018-05-04 PROCEDURE — 99285 EMERGENCY DEPT VISIT HI MDM: CPT

## 2018-05-04 RX ORDER — CHOLECALCIFEROL (VITAMIN D3) 125 MCG
500 CAPSULE ORAL DAILY
Status: ON HOLD | COMMUNITY
End: 2018-06-15

## 2018-05-04 RX ORDER — MEMANTINE HYDROCHLORIDE 5 MG/1
5 TABLET ORAL 2 TIMES DAILY
COMMUNITY

## 2018-05-04 RX ORDER — GUAIFENESIN 100 MG/5ML
100 SYRUP ORAL 4 TIMES DAILY PRN
COMMUNITY
End: 2018-05-30 | Stop reason: HOSPADM

## 2018-05-04 RX ORDER — LOPERAMIDE HYDROCHLORIDE 2 MG/1
2 TABLET ORAL 4 TIMES DAILY PRN
COMMUNITY
End: 2018-06-22 | Stop reason: HOSPADM

## 2018-05-04 RX ORDER — DONEPEZIL HYDROCHLORIDE 5 MG/1
5 TABLET, FILM COATED ORAL
COMMUNITY

## 2018-05-04 RX ORDER — ACETAMINOPHEN 325 MG/1
650 TABLET ORAL EVERY 6 HOURS PRN
COMMUNITY
End: 2018-06-22 | Stop reason: HOSPADM

## 2018-05-04 RX ORDER — ONDANSETRON 4 MG/1
4 TABLET, FILM COATED ORAL EVERY 6 HOURS PRN
COMMUNITY
End: 2018-06-08 | Stop reason: ALTCHOICE

## 2018-05-04 NOTE — ED NOTES
[facility]; unable to take report  Will try at a later time        Arvin Devi RN  05/04/18 5889

## 2018-05-04 NOTE — ED PROVIDER NOTES
History  Chief Complaint   Patient presents with    Dementia     Pt sent via EMS from [facility] for evaluation of hypoxia (reported from facility at 68%, EMS reports 98% RA laureano)  Pt hx of dementia, facility reports increased agitation regarding pt being changed  Pt currently alert to person and cooperative  This 59-year-old female presents from local nursing home for increasing agitation and concern for hypoxia  Patient was apparently becoming agitated when being changed  On EMS arrival patient was found not to be hypoxic, was transported here without incident  On arrival here patient is calmly lying in the bed  Patient has a room air oxygen saturation of 98%  Patient has a soft nontender abdomen, clear lung sounds bilaterally, no tachypnea or increased work of breathing  Patient is well-appearing  Patient is unable provide historical information or answer any questions due to the degree of her dementia  History provided by:  Patient  History limited by:  Dementia   used: No        Prior to Admission Medications   Prescriptions Last Dose Informant Patient Reported? Taking?    LORazepam (ATIVAN) 0 5 mg tablet   Yes Yes   Sig: Take 0 5 mg by mouth every 8 (eight) hours as needed for anxiety   QUEtiapine (SEROquel) 25 mg tablet   Yes Yes   Sig: Take by mouth   acetaminophen (TYLENOL) 325 mg tablet   Yes Yes   Sig: Take 650 mg by mouth every 6 (six) hours as needed for mild pain   amLODIPine (NORVASC) 10 mg tablet   Yes Yes   Sig: Take by mouth   apixaban (ELIQUIS) 5 mg   Yes Yes   Sig: Take 2 5 mg by mouth 2 (two) times a day     atenolol (TENORMIN) 50 mg tablet   Yes Yes   Sig: Take by mouth   cyanocobalamin (VITAMIN B-12) 500 mcg tablet   Yes Yes   Sig: Take 500 mcg by mouth daily   cyanocobalamin 500 MCG tablet   No Yes   Sig: Take 1 tablet by mouth daily   donepezil (ARICEPT) 5 mg tablet   Yes Yes   Sig: Take 5 mg by mouth daily at bedtime   guaiFENesin (ROBITUSSIN) 100 mg/5 mL syrup   Yes Yes   Sig: Take 100 mg by mouth 4 (four) times a day as needed for cough   loperamide (IMODIUM A-D) 2 MG tablet   Yes Yes   Sig: Take 2 mg by mouth 4 (four) times a day as needed for diarrhea   memantine (NAMENDA) 5 mg tablet   Yes Yes   Sig: Take 5 mg by mouth 2 (two) times a day   mupirocin (BACTROBAN) 2 % ointment   Yes Yes   Sig: Apply topically daily   ondansetron (ZOFRAN) 4 mg tablet   Yes Yes   Sig: Take 4 mg by mouth every 6 (six) hours as needed for nausea or vomiting   valsartan (DIOVAN) 320 MG tablet   Yes Yes   Sig: Take by mouth      Facility-Administered Medications: None       Past Medical History:   Diagnosis Date    Alzheimer disease     Arterial occlusion     left leg    Atrial fibrillation (HCC)     Cardiac disease     Dementia     Hypertension     Renal disorder        History reviewed  No pertinent surgical history  History reviewed  No pertinent family history  I have reviewed and agree with the history as documented  Social History   Substance Use Topics    Smoking status: Never Smoker    Smokeless tobacco: Never Used    Alcohol use No        Review of Systems   Unable to perform ROS: Dementia       Physical Exam  ED Triage Vitals [05/04/18 1132]   Temperature Pulse Respirations Blood Pressure SpO2   98 2 °F (36 8 °C) 71 19 137/80 98 %      Temp Source Heart Rate Source Patient Position - Orthostatic VS BP Location FiO2 (%)   Oral Monitor Lying Right arm --      Pain Score       No Pain           Orthostatic Vital Signs  Vitals:    05/04/18 1132 05/04/18 1336   BP: 137/80 125/69   Pulse: 71 69   Patient Position - Orthostatic VS: Lying        Physical Exam   Constitutional: She appears well-developed and well-nourished  She is cooperative  No distress  HENT:   Head: Normocephalic and atraumatic  Eyes: EOM and lids are normal  Pupils are equal, round, and reactive to light  Right eye exhibits no discharge  Left eye exhibits no discharge   Right conjunctiva is not injected  Left conjunctiva is not injected  Neck: Trachea normal, normal range of motion, full passive range of motion without pain and phonation normal  Neck supple  Cardiovascular: Normal rate, regular rhythm, normal heart sounds, intact distal pulses and normal pulses  No murmur heard  Pulses:       Dorsalis pedis pulses are 2+ on the right side, and 2+ on the left side  Pulmonary/Chest: Effort normal and breath sounds normal  She exhibits no tenderness  Abdominal: Soft  She exhibits no distension  There is no tenderness  Musculoskeletal: Normal range of motion  She exhibits no edema  Neurological: She is alert  She has normal strength  She is disoriented  GCS eye subscore is 4  GCS verbal subscore is 4  GCS motor subscore is 6  Skin: Skin is warm, dry and intact  Capillary refill takes less than 2 seconds  No rash noted  Psychiatric: She has a normal mood and affect  Her speech is normal and behavior is normal    Vitals reviewed        ED Medications  Medications - No data to display    Diagnostic Studies  Results Reviewed     Procedure Component Value Units Date/Time    UA w Reflex to Microscopic w Reflex to Culture [[id number]] Collected:  05/04/18 1329    Lab Status:  Final result Specimen:  Urine from Urine, Other Updated:  05/04/18 1338     Color, UA Yellow     Clarity, UA Cloudy     Specific Orma, UA 1 010     pH, UA 6 0     Leukocytes, UA Negative     Nitrite, UA Negative     Protein, UA Negative mg/dl      Glucose, UA Negative mg/dl      Ketones, UA Negative mg/dl      Urobilinogen, UA 1 0 E U /dl      Bilirubin, UA Negative     Blood, UA Negative                 No orders to display              Procedures  Procedures       Phone Contacts  ED Phone Contact    ED Course                               MDM  Number of Diagnoses or Management Options  Diagnosis management comments: Patient observed here for period of approximately 2 hours with no episodes of hypoxia, no tachycardia  Patient has no concerns has been mostly sleeping and resting  Patient's urinalysis is unremarkable for any evidence of urinary tract infection  Patient will be returned to nursing home  Amount and/or Complexity of Data Reviewed  Clinical lab tests: ordered and reviewed  Obtain history from someone other than the patient: yes    Risk of Complications, Morbidity, and/or Mortality  Presenting problems: moderate  Diagnostic procedures: low  Management options: low    Patient Progress  Patient progress: stable    CritCare Time    Disposition  Final diagnoses:   Alzheimer's dementia with behavioral disturbance, unspecified timing of dementia onset     Time reflects when diagnosis was documented in both MDM as applicable and the Disposition within this note     Time User Action Codes Description Comment    5/4/2018  1:48 PM Jaky Snow Add [G30 9,  F02 81] Alzheimer's dementia with behavioral disturbance, unspecified timing of dementia onset       ED Disposition     ED Disposition Condition Comment    Discharge  Anthony Garduno discharge to home/self care  Condition at discharge: Stable        Follow-up Information     Follow up With Specialties Details Why Contact Info    Capo Jalloh, [address], Nurse Practitioner Schedule an appointment as soon as possible for a visit As needed for further management of your symptoms [address] [place] [address]  [phone]          Patient's Medications   Discharge Prescriptions    No medications on file     No discharge procedures on file      ED Provider  Electronically Signed by           Mikel Blackmon MD  05/04/18 8401

## 2018-05-04 NOTE — ED NOTES
Pt remains stable with no drop in O2 saturation  Pt appears to be asleep in stretcher at this time        Olayinka Cotto, DAYANARA  05/04/18 1359

## 2018-05-04 NOTE — DISCHARGE INSTRUCTIONS
Alzheimer Disease   AMBULATORY CARE:   Alzheimer disease  is an irreversible brain disorder that results in the gradual loss of memory  Parts of the brain die and cannot make normal levels of brain chemicals  The disease usually starts at about age 72 to 79 years but can start earlier  Common symptoms include the following:   · Mild AD:  Early AD symptoms may be minor and last from 1 to 3 years  ¨ Memory loss: Remembering what happened years ago, but unable to remember things from yesterday or forgetting the names of common things or people you know    ¨ Confusion about what month or season it is    ¨ Forgetting to brush your teeth or comb your hair    ¨ Difficulty taking care of your home or finances or difficulty making decisions    ¨ Loss of interest in your usual activities    ¨ Feeling depressed, angry, or confused about the changes you notice    · Moderate AD:      ¨ Problems choosing what clothes to wear, doing simple jobs, or caring for your self    ¨ Unable to recognize people familiar to you    ¨ Difficulty finding words to say what you mean, trouble talking in normal sentences, or speech that is difficult to understand    ¨ Feeling anxious, restless, and agitated at night and seeming depressed or worried    ¨ Difficulty controlling emotions and becoming loud, violent, and hard to control    ¨ Becoming confused and wandering off or pacing    ¨ Unable to plan and follow through with activities    ¨ Thinking something is true even though it is not, seeing things that are not actually there, or inability to control when you urinate    · Severe AD:      ¨ Complete loss of memory    ¨ Complete loss of speech    ¨ Loss of bladder and bowel control    ¨ Finding it very difficult to walk    ¨ Becoming angry and out of control or aggressive and destroying things    ¨ Unable to care for yourself and needing someone to take care of you  Contact your healthcare provider if:   · You have a fever      · You have a rash or your skin is itchy and swollen  · You are depressed and have difficulty coping with your symptoms  · You have questions or concerns about your condition or care  Treatment for AD  may include medicines to help increase the amount of normal chemicals in your brain or slow the death of brain cells  You may also need medicines to help control your mood or help you feel less nervous or restless  Medicines to help with bladder and bowel control or to help you sleep better may be needed  Some people may be given medicines to control delusions (false beliefs), hallucinations, or violent behaviors  Counseling can teach you ways to cope with your disease  Care for someone who has AD:   · Keep activities the same from day to day  People with AD do best with daily routines  Take breaks often  Save difficult activities for when the person seems the most alert  Choose activities that the person is interested in doing  Help the person get started and try to break difficult activities into small steps  · Keep mealtimes at the same time each day  It is best to limit food choices  Eating patterns may change in people with AD  It may help to have the person eat small meals and snacks  Ask healthcare providers about giving vitamins if you do not think the person is eating enough  · Get regular exercise  Regular exercise may help decrease depression and anxiety and improve sleep  Walking is a good exercise for people with AD  Check local senior centers for information about group exercise activities  · Learn to recognize pain or discomfort  Noisy breathing or rigid body movements may be a sign of pain  A sad or scared look may also mean the person is having discomfort  The person may also constantly make certain noises when he or she is in pain  · Provide personal care  Make sure to check the water temperature of the bath or shower so they do not burn themselves   It may help to choose and lay out clothes each night for them to wear the next day  Make sure to brush the person's teeth or dentures daily  Do not forget to take the person to the dentist for exams  · Provide a safe environment  Go through the house and remove things that could cause accidents  Make sure household  and medicines are out of reach  You may need to remove the stove burner knobs and hide matches and lighters to prevent injury  Remove loose rugs to prevent falls  Keep doors and windows tightly closed to prevent wandering or other injuries  If the person smokes, make sure cigarettes are always put out  · Keep the person with AD active and awake during the day  Limit how much liquid the person drinks in the evening  This may help him or her sleep through the night  Make sure that the person goes to bed at the same time every night  · Use short words or sentences when speaking to the person with AD  Call the person by name and speak slowly, clearly, and calmly  Use short words and sentences  Use the same words if you have to repeat yourself  Do not ask too many questions  Do not argue with the person  · Create a bathroom schedule  This may mean reminding the person to urinate every 4 hours  Be aware of the person's bowel movement routine and keep it the same  · Prevent wandering  New door locks may be needed to keep the person from going outside alone  An alarm system near doors may tell you if the person wanders out  Have the person wear a necklace or bracelet with their name and phone number on it in case they wander away from you and are found by someone else  Follow up with your healthcare provider as directed:  Ask someone to go with you to help you remember what your healthcare provider tells you  The person can take notes for you during the visit and go over the notes with you later  Write down your questions so you remember to ask them during your visits    © 2017 [facility]  is for End User's use only and may not be sold, redistributed or otherwise used for commercial purposes  All illustrations and images included in CareNotes® are the copyrighted property of A D A M [employer] Inc  or Leonel Ortega  The above information is an  only  It is not intended as medical advice for individual conditions or treatments  Talk to your doctor, nurse or pharmacist before following any medical regimen to see if it is safe and effective for you

## 2018-05-27 ENCOUNTER — HOSPITAL ENCOUNTER (INPATIENT)
Facility: HOSPITAL | Age: 83
LOS: 3 days | Discharge: HOME | DRG: 372 | End: 2018-05-30
Attending: EMERGENCY MEDICINE | Admitting: INTERNAL MEDICINE
Payer: MEDICARE

## 2018-05-27 DIAGNOSIS — A04.72 C. DIFFICILE DIARRHEA: ICD-10-CM

## 2018-05-27 DIAGNOSIS — I48.91 ATRIAL FIBRILLATION (HCC): ICD-10-CM

## 2018-05-27 DIAGNOSIS — K92.2 GI BLEED: Primary | ICD-10-CM

## 2018-05-27 DIAGNOSIS — F03.90 DEMENTIA (HCC): ICD-10-CM

## 2018-05-27 DIAGNOSIS — Z79.01 ANTICOAGULATED: ICD-10-CM

## 2018-05-27 DIAGNOSIS — D64.9 ANEMIA: ICD-10-CM

## 2018-05-27 DIAGNOSIS — R53.81 PHYSICAL DECONDITIONING: ICD-10-CM

## 2018-05-27 DIAGNOSIS — K92.1 MELENA: ICD-10-CM

## 2018-05-27 LAB
ABO GROUP BLD: NORMAL
ALBUMIN SERPL BCP-MCNC: 2.8 G/DL (ref 3.5–5)
ALP SERPL-CCNC: 96 U/L (ref 46–116)
ALT SERPL W P-5'-P-CCNC: 12 U/L (ref 12–78)
ANION GAP SERPL CALCULATED.3IONS-SCNC: 11 MMOL/L (ref 4–13)
APTT PPP: 27 SECONDS (ref 24–36)
AST SERPL W P-5'-P-CCNC: 14 U/L (ref 5–45)
BASOPHILS # BLD AUTO: 0.04 THOUSANDS/ΜL (ref 0–0.1)
BASOPHILS NFR BLD AUTO: 1 % (ref 0–1)
BILIRUB SERPL-MCNC: 0.5 MG/DL (ref 0.2–1)
BLD GP AB SCN SERPL QL: NEGATIVE
BUN SERPL-MCNC: 20 MG/DL (ref 5–25)
CALCIUM SERPL-MCNC: 8.3 MG/DL (ref 8.3–10.1)
CHLORIDE SERPL-SCNC: 109 MMOL/L (ref 100–108)
CO2 SERPL-SCNC: 22 MMOL/L (ref 21–32)
CREAT SERPL-MCNC: 1.57 MG/DL (ref 0.6–1.3)
EOSINOPHIL # BLD AUTO: 0.11 THOUSAND/ΜL (ref 0–0.61)
EOSINOPHIL NFR BLD AUTO: 1 % (ref 0–6)
ERYTHROCYTE [DISTWIDTH] IN BLOOD BY AUTOMATED COUNT: 15.3 % (ref 11.6–15.1)
GFR SERPL CREATININE-BSD FRML MDRD: 28 ML/MIN/1.73SQ M
GLUCOSE SERPL-MCNC: 143 MG/DL (ref 65–140)
HCT VFR BLD AUTO: 35.7 % (ref 34.8–46.1)
HCT VFR BLD AUTO: 39.7 % (ref 34.8–46.1)
HCT VFR BLD AUTO: 40.3 % (ref 34.8–46.1)
HGB BLD-MCNC: 11.2 G/DL (ref 11.5–15.4)
HGB BLD-MCNC: 12.6 G/DL (ref 11.5–15.4)
HGB BLD-MCNC: 12.8 G/DL (ref 11.5–15.4)
IMM GRANULOCYTES # BLD AUTO: 0.02 THOUSAND/UL (ref 0–0.2)
IMM GRANULOCYTES NFR BLD AUTO: 0 % (ref 0–2)
INR PPP: 1.22 (ref 0.86–1.17)
LACTATE SERPL-SCNC: 2.2 MMOL/L (ref 0.5–2)
LYMPHOCYTES # BLD AUTO: 0.64 THOUSANDS/ΜL (ref 0.6–4.47)
LYMPHOCYTES NFR BLD AUTO: 8 % (ref 14–44)
MAGNESIUM SERPL-MCNC: 1.9 MG/DL (ref 1.6–2.6)
MCH RBC QN AUTO: 27.9 PG (ref 26.8–34.3)
MCHC RBC AUTO-ENTMCNC: 31.4 G/DL (ref 31.4–37.4)
MCV RBC AUTO: 89 FL (ref 82–98)
MONOCYTES # BLD AUTO: 0.6 THOUSAND/ΜL (ref 0.17–1.22)
MONOCYTES NFR BLD AUTO: 8 % (ref 4–12)
NEUTROPHILS # BLD AUTO: 6.56 THOUSANDS/ΜL (ref 1.85–7.62)
NEUTS SEG NFR BLD AUTO: 82 % (ref 43–75)
NRBC BLD AUTO-RTO: 0 /100 WBCS
PLATELET # BLD AUTO: 223 THOUSANDS/UL (ref 149–390)
PMV BLD AUTO: 10.5 FL (ref 8.9–12.7)
POTASSIUM SERPL-SCNC: 3.8 MMOL/L (ref 3.5–5.3)
PROT SERPL-MCNC: 5.8 G/DL (ref 6.4–8.2)
PROTHROMBIN TIME: 15.3 SECONDS (ref 11.8–14.2)
RBC # BLD AUTO: 4.01 MILLION/UL (ref 3.81–5.12)
RH BLD: POSITIVE
SODIUM SERPL-SCNC: 142 MMOL/L (ref 136–145)
SPECIMEN EXPIRATION DATE: NORMAL
WBC # BLD AUTO: 7.97 THOUSAND/UL (ref 4.31–10.16)

## 2018-05-27 PROCEDURE — 36415 COLL VENOUS BLD VENIPUNCTURE: CPT | Performed by: EMERGENCY MEDICINE

## 2018-05-27 PROCEDURE — 86850 RBC ANTIBODY SCREEN: CPT | Performed by: EMERGENCY MEDICINE

## 2018-05-27 PROCEDURE — 83735 ASSAY OF MAGNESIUM: CPT | Performed by: EMERGENCY MEDICINE

## 2018-05-27 PROCEDURE — 85610 PROTHROMBIN TIME: CPT | Performed by: EMERGENCY MEDICINE

## 2018-05-27 PROCEDURE — 99285 EMERGENCY DEPT VISIT HI MDM: CPT

## 2018-05-27 PROCEDURE — 87493 C DIFF AMPLIFIED PROBE: CPT | Performed by: NURSE PRACTITIONER

## 2018-05-27 PROCEDURE — 83605 ASSAY OF LACTIC ACID: CPT | Performed by: EMERGENCY MEDICINE

## 2018-05-27 PROCEDURE — 86901 BLOOD TYPING SEROLOGIC RH(D): CPT | Performed by: EMERGENCY MEDICINE

## 2018-05-27 PROCEDURE — 85025 COMPLETE CBC W/AUTO DIFF WBC: CPT | Performed by: EMERGENCY MEDICINE

## 2018-05-27 PROCEDURE — 85018 HEMOGLOBIN: CPT | Performed by: NURSE PRACTITIONER

## 2018-05-27 PROCEDURE — C9113 INJ PANTOPRAZOLE SODIUM, VIA: HCPCS | Performed by: INTERNAL MEDICINE

## 2018-05-27 PROCEDURE — 86900 BLOOD TYPING SEROLOGIC ABO: CPT | Performed by: EMERGENCY MEDICINE

## 2018-05-27 PROCEDURE — 85730 THROMBOPLASTIN TIME PARTIAL: CPT | Performed by: EMERGENCY MEDICINE

## 2018-05-27 PROCEDURE — 99222 1ST HOSP IP/OBS MODERATE 55: CPT | Performed by: INTERNAL MEDICINE

## 2018-05-27 PROCEDURE — 80053 COMPREHEN METABOLIC PANEL: CPT | Performed by: EMERGENCY MEDICINE

## 2018-05-27 PROCEDURE — 85014 HEMATOCRIT: CPT | Performed by: NURSE PRACTITIONER

## 2018-05-27 PROCEDURE — 93005 ELECTROCARDIOGRAM TRACING: CPT

## 2018-05-27 RX ORDER — SODIUM CHLORIDE 9 MG/ML
75 INJECTION, SOLUTION INTRAVENOUS CONTINUOUS
Status: DISCONTINUED | OUTPATIENT
Start: 2018-05-27 | End: 2018-05-28

## 2018-05-27 RX ORDER — HALOPERIDOL 5 MG/ML
2 INJECTION INTRAMUSCULAR ONCE
Status: DISCONTINUED | OUTPATIENT
Start: 2018-05-27 | End: 2018-05-27

## 2018-05-27 RX ORDER — ACETAMINOPHEN 325 MG/1
650 TABLET ORAL EVERY 6 HOURS PRN
Status: DISCONTINUED | OUTPATIENT
Start: 2018-05-27 | End: 2018-05-30 | Stop reason: HOSPADM

## 2018-05-27 RX ORDER — MEMANTINE HYDROCHLORIDE 5 MG/1
5 TABLET ORAL 2 TIMES DAILY
Status: DISCONTINUED | OUTPATIENT
Start: 2018-05-27 | End: 2018-05-30 | Stop reason: HOSPADM

## 2018-05-27 RX ORDER — HALOPERIDOL 5 MG/ML
2 INJECTION INTRAMUSCULAR ONCE
Status: COMPLETED | OUTPATIENT
Start: 2018-05-27 | End: 2018-05-27

## 2018-05-27 RX ORDER — DONEPEZIL HYDROCHLORIDE 5 MG/1
5 TABLET, FILM COATED ORAL
Status: DISCONTINUED | OUTPATIENT
Start: 2018-05-27 | End: 2018-05-30 | Stop reason: HOSPADM

## 2018-05-27 RX ORDER — ONDANSETRON 4 MG/1
4 TABLET, ORALLY DISINTEGRATING ORAL EVERY 8 HOURS PRN
Status: DISCONTINUED | OUTPATIENT
Start: 2018-05-27 | End: 2018-05-30 | Stop reason: HOSPADM

## 2018-05-27 RX ORDER — VALSARTAN 160 MG/1
320 TABLET ORAL DAILY
Status: DISCONTINUED | OUTPATIENT
Start: 2018-05-27 | End: 2018-05-30 | Stop reason: HOSPADM

## 2018-05-27 RX ORDER — QUETIAPINE FUMARATE 25 MG/1
25 TABLET, FILM COATED ORAL
Status: DISCONTINUED | OUTPATIENT
Start: 2018-05-27 | End: 2018-05-30 | Stop reason: HOSPADM

## 2018-05-27 RX ORDER — CHOLECALCIFEROL (VITAMIN D3) 125 MCG
500 CAPSULE ORAL DAILY
Status: DISCONTINUED | OUTPATIENT
Start: 2018-05-27 | End: 2018-05-30 | Stop reason: HOSPADM

## 2018-05-27 RX ORDER — PANTOPRAZOLE SODIUM 40 MG/1
40 INJECTION, POWDER, FOR SOLUTION INTRAVENOUS EVERY 12 HOURS SCHEDULED
Status: DISCONTINUED | OUTPATIENT
Start: 2018-05-27 | End: 2018-05-28

## 2018-05-27 RX ORDER — ATENOLOL 50 MG/1
50 TABLET ORAL DAILY
Status: DISCONTINUED | OUTPATIENT
Start: 2018-05-27 | End: 2018-05-30 | Stop reason: HOSPADM

## 2018-05-27 RX ORDER — AMLODIPINE BESYLATE 10 MG/1
10 TABLET ORAL DAILY
Status: DISCONTINUED | OUTPATIENT
Start: 2018-05-27 | End: 2018-05-30 | Stop reason: HOSPADM

## 2018-05-27 RX ORDER — LORAZEPAM 0.5 MG/1
0.5 TABLET ORAL EVERY 8 HOURS PRN
Status: DISCONTINUED | OUTPATIENT
Start: 2018-05-27 | End: 2018-05-28

## 2018-05-27 RX ADMIN — Medication 500 MCG: at 13:21

## 2018-05-27 RX ADMIN — VALSARTAN 320 MG: 160 TABLET, FILM COATED ORAL at 13:21

## 2018-05-27 RX ADMIN — APIXABAN 2.5 MG: 2.5 TABLET, FILM COATED ORAL at 13:22

## 2018-05-27 RX ADMIN — LORAZEPAM 0.5 MG: 0.5 TABLET ORAL at 13:22

## 2018-05-27 RX ADMIN — HALOPERIDOL LACTATE 2 MG: 5 INJECTION, SOLUTION INTRAMUSCULAR at 18:41

## 2018-05-27 RX ADMIN — MEMANTINE HYDROCHLORIDE 5 MG: 5 TABLET ORAL at 17:55

## 2018-05-27 RX ADMIN — ATENOLOL 50 MG: 50 TABLET ORAL at 13:22

## 2018-05-27 RX ADMIN — AMLODIPINE BESYLATE 10 MG: 10 TABLET ORAL at 13:21

## 2018-05-27 RX ADMIN — SODIUM CHLORIDE 75 ML/HR: 0.9 INJECTION, SOLUTION INTRAVENOUS at 17:56

## 2018-05-27 RX ADMIN — MEMANTINE HYDROCHLORIDE 5 MG: 5 TABLET ORAL at 13:21

## 2018-05-27 RX ADMIN — PANTOPRAZOLE SODIUM 40 MG: 40 INJECTION, POWDER, FOR SOLUTION INTRAVENOUS at 20:52

## 2018-05-27 NOTE — ED PROVIDER NOTES
History  Chief Complaint   Patient presents with    Black or Bloody Stool     as per EMS staff at the facility stated the pt had black, tarry stools  also report having "red bloody mucous mixed in " pt is on eliquis  pt is pleasantly confused with dementia and states she is not sick  This is a 80 y o  old female who presents to the ED for evaluation of bloody bowel movements  Patient has history of dementia and lives at [address] [place] [address]  She was referred to the ED after known having dark tarry stools as well as an episode of bloody mucousy stools  It sounds like this started today  We are unable to get any further history from the patient  She has no complaints of pain  Review of the medical record shows she is anticoagulated on Eliquis secondary to atrial fibrillation  Family was notified by the nursing the patient is in the hospital             Prior to Admission Medications   Prescriptions Last Dose Informant Patient Reported? Taking?    LORazepam (ATIVAN) 0 5 mg tablet   Yes No   Sig: Take 0 5 mg by mouth every 8 (eight) hours as needed for anxiety   QUEtiapine (SEROquel) 25 mg tablet   Yes No   Sig: Take by mouth   acetaminophen (TYLENOL) 325 mg tablet   Yes No   Sig: Take 650 mg by mouth every 6 (six) hours as needed for mild pain   amLODIPine (NORVASC) 10 mg tablet   Yes No   Sig: Take by mouth   apixaban (ELIQUIS) 5 mg   Yes No   Sig: Take 2 5 mg by mouth 2 (two) times a day     atenolol (TENORMIN) 50 mg tablet   Yes No   Sig: Take by mouth   cyanocobalamin (VITAMIN B-12) 500 mcg tablet   Yes No   Sig: Take 500 mcg by mouth daily   cyanocobalamin 500 MCG tablet   No No   Sig: Take 1 tablet by mouth daily   donepezil (ARICEPT) 5 mg tablet   Yes No   Sig: Take 5 mg by mouth daily at bedtime   guaiFENesin (ROBITUSSIN) 100 mg/5 mL syrup   Yes No   Sig: Take 100 mg by mouth 4 (four) times a day as needed for cough   loperamide (IMODIUM A-D) 2 MG tablet   Yes No   Sig: Take 2 mg by mouth 4 (four) times a day as needed for diarrhea   memantine (NAMENDA) 5 mg tablet   Yes No   Sig: Take 5 mg by mouth 2 (two) times a day   mupirocin (BACTROBAN) 2 % ointment   Yes No   Sig: Apply topically daily   ondansetron (ZOFRAN) 4 mg tablet   Yes No   Sig: Take 4 mg by mouth every 6 (six) hours as needed for nausea or vomiting   valsartan (DIOVAN) 320 MG tablet   Yes No   Sig: Take by mouth      Facility-Administered Medications: None       Past Medical History:   Diagnosis Date    Alzheimer disease     Arterial occlusion     left leg    Atrial fibrillation ([facility] [address] )     Cardiac disease     Dementia     Hypertension     Renal disorder        History reviewed  No pertinent surgical history  Family History   Problem Relation Age of Onset    Hypertension Mother      I have reviewed and agree with the history as documented  Social History   Substance Use Topics    Smoking status: Never Smoker    Smokeless tobacco: Never Used    Alcohol use No        Review of Systems   Unable to perform ROS: Dementia   Gastrointestinal: Positive for blood in stool  Negative for abdominal pain  Physical Exam  Physical Exam   Constitutional: Vital signs are normal  She appears well-developed  She appears cachectic  She is cooperative  No distress  Appears older than stated age   HENT:   Head: Normocephalic and atraumatic  Mouth/Throat: No oropharyngeal exudate  Eyes: Conjunctivae and EOM are normal  Pupils are equal, round, and reactive to light  Right eye exhibits no discharge  Left eye exhibits no discharge  Neck: Normal range of motion  No JVD present  No tracheal deviation present  No thyromegaly present  Cardiovascular: Normal rate and regular rhythm  No murmur heard  Pulmonary/Chest: Effort normal  No respiratory distress  She has decreased breath sounds in the right lower field and the left lower field  She has no wheezes  She has no rales  Abdominal: Soft   Bowel sounds are normal  There is no tenderness  Genitourinary: Rectal exam shows guaiac positive stool  Musculoskeletal: Normal range of motion  She exhibits no edema  Neurological: She is alert  She has normal strength  She is disoriented  She displays atrophy  No cranial nerve deficit or sensory deficit  GCS eye subscore is 4  GCS verbal subscore is 5  GCS motor subscore is 6  Skin: Skin is warm and dry  Capillary refill takes less than 2 seconds  No rash noted  She is not diaphoretic  Psychiatric: Her speech is normal and behavior is normal  Thought content normal  She is not actively hallucinating  Cognition and memory are impaired  She does not exhibit a depressed mood  She exhibits abnormal remote memory  Nursing note and vitals reviewed      Vital Signs  ED Triage Vitals [05/27/18 0955]   Temperature Pulse Respirations Blood Pressure SpO2   98 9 °F (37 2 °C) 83 16 114/65 100 %      Temp Source Heart Rate Source Patient Position - Orthostatic VS BP Location FiO2 (%)   Oral Monitor Lying Right arm --      Pain Score       --         ED Medications  Medications - No data to display    Diagnostic Studies  Results Reviewed     Procedure Component Value Units Date/Time    Protime-INR [[id number]]  (Abnormal) Collected:  05/27/18 1007    Lab Status:  Final result Specimen:  Blood from Arm, Left Updated:  05/27/18 1029     Protime 15 3 (H) seconds      INR 1 22 (H)    APTT [[id number]]  (Normal) Collected:  05/27/18 1007    Lab Status:  Final result Specimen:  Blood from Arm, Left Updated:  05/27/18 1029     PTT 27 seconds     CBC and differential [[id number]]  (Abnormal) Collected:  05/27/18 1007    Lab Status:  Final result Specimen:  Blood from Arm, Left Updated:  05/27/18 1015     WBC 7 97 Thousand/uL      RBC 4 01 Million/uL      Hemoglobin 11 2 (L) g/dL      Hematocrit 35 7 %      MCV 89 fL      MCH 27 9 pg      MCHC 31 4 g/dL      RDW 15 3 (H) %      MPV 10 5 fL      Platelets 875 Thousands/uL      nRBC 0 /100 WBCs      Neutrophils Relative 82 (H) %      Immat GRANS % 0 %      Lymphocytes Relative 8 (L) %      Monocytes Relative 8 %      Eosinophils Relative 1 %      Basophils Relative 1 %      Neutrophils Absolute 6 56 Thousands/µL      Immature Grans Absolute 0 02 Thousand/uL      Lymphocytes Absolute 0 64 Thousands/µL      Monocytes Absolute 0 60 Thousand/µL      Eosinophils Absolute 0 11 Thousand/µL      Basophils Absolute 0 04 Thousands/µL     Comprehensive metabolic panel [[id number]] Collected:  05/27/18 1007    Lab Status: In process Specimen:  Blood from Arm, Left Updated:  05/27/18 1011    Magnesium [[id number]] Collected:  05/27/18 1007    Lab Status: In process Specimen:  Blood from Arm, Left Updated:  05/27/18 1011    Lactic acid, plasma [[id number]] Collected:  05/27/18 1007    Lab Status: In process Specimen:  Blood from Arm, Left Updated:  05/27/18 1011    UA w Reflex to Microscopic [[id number]]     Lab Status:  No result Specimen:  Urine         No orders to display     Procedures  ECG 12 Lead Documentation  Date/Time: 5/27/2018 10:04 AM  Performed by: Nehal Mackey  Authorized by: Nehal Mackey     Indications / Diagnosis:  GI Bleed  ECG reviewed by me, the ED Provider: yes    Patient location:  ED  Comments:      Atrial fibrillation, rate 70, narrow QRS, normal QTC, no acute ST, T-wave changes  When compared to previous EKG dated 07/03/2017, no acute changes are noted  Phone Contacts  ED Phone Contact    ED Course      A/P: This is a 80 y o  female who presents to the ED for evaluation of GI bleed  Patient is Hemoccult positive  She is anticoagulated but hemodynamically stable  At this time we will not reverse with [facility] and will continue to monitor and trend  Will check CBC, CMP, UA, EKG  Admit to Medicine      MDM  CritCare Time    Disposition  Final diagnoses:   GI bleed   Anticoagulated   Anemia   Dementia   Physical deconditioning     Time reflects when diagnosis was documented in both MDM as applicable and the Disposition within this note     Time User Action Codes Description Comment    5/27/2018 10:29 AM Dustin Sprinkles Add [K92 2] GI bleed     5/27/2018 10:29 AM Dustin Sprinkles Add [Z79 01] Anticoagulated     5/27/2018 10:29 AM Dustin Sprinkles Add [D64 9] Anemia     5/27/2018 10:29 AM Dustin Sprinkles Add [F03 90] Dementia     5/27/2018 10:29 AM Dustin Sprinkles Add [R53 81] Physical deconditioning       ED Disposition     ED Disposition Condition Comment    Admit  Case was discussed with [facility] and the patient's admission status was agreed to be Admission Status: inpatient status to the service of Dr Magaly Mata         Follow-up Information    None         Patient's Medications   Discharge Prescriptions    No medications on file     No discharge procedures on file      ED Provider  Electronically Signed by           Harshad Bautista MD  05/27/18 6888

## 2018-05-27 NOTE — H&P
H&P- Adore Merino 3/28/1926, 80 y o  female MRN: [id number]    Unit/Bed#: -01 Encounter: [id number]    Primary Care Provider: Patrice Meckel, CRNP   Date and time admitted to hospital: 5/27/2018  9:51 AM        * Melena   Assessment & Plan    · Reported by patient's long-term care facility along with some bloody stools with mucus  · Reported diarrhea with fecal incontinence in ED will check cdiff  · Hemoglobin stable heme test positive stool noted  · Will trend H&H q 6 hours  · PPI IV b i d   · Consult GI        Atrial fibrillation ([address] )   Assessment & Plan    · Patient showing atrial fibrillation on EKG  · Rate is controlled  · Hemoglobin is stable however will consult Cardiology for cardiac clearance in case GI id wants to EGD/colonoscopy        Alzheimer's dementia with behavioral disturbance   Assessment & Plan    · Comfort measures  · Frequent reorientation  · Out of bed with assistance        Falls   Assessment & Plan    · Given Alzheimer's history recommend bed alarm  · Out of bed with assistance          VTE Prophylaxis: Apixaban (Eliquis)  / sequential compression device   Code Status:  Full code until this can be addressed with nursing or family  POLST: POLST form is not discussed and not completed at this time  Discussion with family:  None at bedside    Anticipated Length of Stay:  Patient will be admitted on an Inpatient basis with an anticipated length of stay of  > 2 midnights  Justification for Hospital Stay:  Evidence of GI bleed    Total Time for Visit, including Counseling / Coordination of Care: 45 minutes  Greater than 50% of this total time spent on direct patient counseling and coordination of care      Chief Complaint:   I have no pain    History of Present Illness:    Adore Merino is a 80 y o  female who presents with known history of atrial fibrillation and dementia presenting from her long-term skilled facility with the facility itself was reporting dark stool and 1 episode of bloody stool with mucus  Patient on question is pleasantly confused denies any problems and does not know where she is at currently  No family at the bedside and reported by ED that the family is out of town to the holiday weekend  Review of Systems:    Review of Systems   Unable to perform ROS: Dementia       Past Medical and Surgical History:     Past Medical History:   Diagnosis Date    Alzheimer disease     Arterial occlusion     left leg    Atrial fibrillation ([address] )     Cardiac disease     Dementia     Hypertension     Renal disorder        History reviewed  No pertinent surgical history  Meds/Allergies:    Prior to Admission medications    Medication Sig Start Date End Date Taking?  Authorizing Provider   acetaminophen (TYLENOL) 325 mg tablet Take 650 mg by mouth every 6 (six) hours as needed for mild pain    Historical Provider, MD   amLODIPine (NORVASC) 10 mg tablet Take by mouth    Historical Provider, MD   apixaban (ELIQUIS) 5 mg Take 2 5 mg by mouth 2 (two) times a day      Historical Provider, MD   atenolol (TENORMIN) 50 mg tablet Take by mouth 6/7/17   Historical Provider, MD   cyanocobalamin (VITAMIN B-12) 500 mcg tablet Take 500 mcg by mouth daily    Historical Provider, MD   cyanocobalamin 500 MCG tablet Take 1 tablet by mouth daily 7/3/17   Alejandra Lees MD   donepezil (ARICEPT) 5 mg tablet Take 5 mg by mouth daily at bedtime    Historical Provider, MD   guaiFENesin (ROBITUSSIN) 100 mg/5 mL syrup Take 100 mg by mouth 4 (four) times a day as needed for cough    Historical Provider, MD   loperamide (IMODIUM A-D) 2 MG tablet Take 2 mg by mouth 4 (four) times a day as needed for diarrhea    Historical Provider, MD   LORazepam (ATIVAN) 0 5 mg tablet Take 0 5 mg by mouth every 8 (eight) hours as needed for anxiety    Historical Provider, MD   memantine (NAMENDA) 5 mg tablet Take 5 mg by mouth 2 (two) times a day    Historical Provider, MD   mupirocin (BACTROBAN) 2 % ointment Apply topically daily    Historical Provider, MD   ondansetron (ZOFRAN) 4 mg tablet Take 4 mg by mouth every 6 (six) hours as needed for nausea or vomiting    Historical Provider, MD   QUEtiapine (SEROquel) 25 mg tablet Take by mouth    Historical Provider, MD   valsartan (DIOVAN) 320 MG tablet Take by mouth    Historical Provider, MD     I have reveiwed home medications using records provided by [facility]  Allergies: No Known Allergies    Social History:     Marital Status:    Occupation:    Patient Pre-hospital Living Situation:    Patient Pre-hospital Level of Mobility:     Patient Pre-hospital Diet Restrictions:    Substance Use History:   History   Alcohol Use No     History   Smoking Status    Never Smoker   Smokeless Tobacco    Never Used     History   Drug Use No       Family History:    non-contributory    Physical Exam:     Vitals:   Blood Pressure: 114/65 (05/27/18 0955)  Pulse: 83 (05/27/18 0955)  Temperature: 98 9 °F (37 2 °C) (05/27/18 0955)  Temp Source: Oral (05/27/18 0955)  Respirations: 16 (05/27/18 0955)  Weight - Scale: 58 4 kg (128 lb 12 oz) (05/27/18 0955)  SpO2: 100 % (05/27/18 0955)    Physical Exam   Constitutional: No distress  HENT:   Head: Normocephalic and atraumatic  Eyes: Conjunctivae and EOM are normal    Neck: Normal range of motion  Cardiovascular: Normal rate and normal heart sounds  An irregular rhythm present  Pulmonary/Chest: Effort normal and breath sounds normal    Abdominal: Soft  Bowel sounds are normal  There is no tenderness  Musculoskeletal: Normal range of motion  Neurological: She is alert  Pleasantly confused   Skin: Skin is warm and dry  Psychiatric: She has a normal mood and affect  Her behavior is normal            Additional Data:     Lab Results: I have personally reviewed pertinent reports          Results from last 7 days  Lab Units 05/27/18  1007   WBC Thousand/uL 7 97   HEMOGLOBIN g/dL 11 2*   HEMATOCRIT % 35 7   PLATELETS Thousands/uL 223   NEUTROS PCT % 82*   LYMPHS PCT % 8*   MONOS PCT % 8   EOS PCT % 1       Results from last 7 days  Lab Units 05/27/18  1007   SODIUM mmol/L 142   POTASSIUM mmol/L 3 8   CHLORIDE mmol/L 109*   CO2 mmol/L 22   BUN mg/dL 20   CREATININE mg/dL 1 57*   CALCIUM mg/dL 8 3   TOTAL PROTEIN g/dL 5 8*   BILIRUBIN TOTAL mg/dL 0 50   ALK PHOS U/L 96   ALT U/L 12   AST U/L 14   GLUCOSE RANDOM mg/dL 143*       Results from last 7 days  Lab Units 05/27/18  1007   INR  1 22*               Imaging: I have personally reviewed pertinent reports  No orders to display       EKG, Pathology, and Other Studies Reviewed on Admission:   · EKG:  Atrial fibrillation    Allscripts / Epic Records Reviewed: Yes     ** Please Note: This note has been constructed using a voice recognition system   **

## 2018-05-27 NOTE — ASSESSMENT & PLAN NOTE
· Patient showing atrial fibrillation on EKG  · Rate is controlled  · Hemoglobin is stable however will consult Cardiology for cardiac clearance in case GI id wants to EGD/colonoscopy

## 2018-05-27 NOTE — ASSESSMENT & PLAN NOTE
· Reported by patient's long-term care facility along with some bloody stools with mucus  · Reported diarrhea with fecal incontinence in ED will check cdiff  · Hemoglobin stable heme test positive stool noted  · Will trend H&H q 6 hours  · PPI IV b i d   · Consult GI

## 2018-05-28 PROBLEM — K92.2 GI BLEED: Status: ACTIVE | Noted: 2018-05-27

## 2018-05-28 PROBLEM — D64.9 ANEMIA: Status: ACTIVE | Noted: 2018-05-27

## 2018-05-28 PROBLEM — A04.72 C. DIFFICILE DIARRHEA: Status: ACTIVE | Noted: 2018-05-28

## 2018-05-28 LAB
ANION GAP SERPL CALCULATED.3IONS-SCNC: 9 MMOL/L (ref 4–13)
ATRIAL RATE: 178 BPM
BACTERIA UR QL AUTO: ABNORMAL /HPF
BILIRUB UR QL STRIP: NEGATIVE
BUN SERPL-MCNC: 21 MG/DL (ref 5–25)
C DIFF TOX GENS STL QL NAA+PROBE: ABNORMAL
CALCIUM SERPL-MCNC: 8.1 MG/DL (ref 8.3–10.1)
CHLORIDE SERPL-SCNC: 109 MMOL/L (ref 100–108)
CLARITY UR: CLEAR
CO2 SERPL-SCNC: 21 MMOL/L (ref 21–32)
COLOR UR: ABNORMAL
CREAT SERPL-MCNC: 1.41 MG/DL (ref 0.6–1.3)
ERYTHROCYTE [DISTWIDTH] IN BLOOD BY AUTOMATED COUNT: 15.3 % (ref 11.6–15.1)
GFR SERPL CREATININE-BSD FRML MDRD: 32 ML/MIN/1.73SQ M
GLUCOSE SERPL-MCNC: 102 MG/DL (ref 65–140)
GLUCOSE UR STRIP-MCNC: NEGATIVE MG/DL
HCT VFR BLD AUTO: 35.2 % (ref 34.8–46.1)
HGB BLD-MCNC: 11.1 G/DL (ref 11.5–15.4)
HGB BLD-MCNC: 12 G/DL (ref 11.5–15.4)
HGB UR QL STRIP.AUTO: ABNORMAL
KETONES UR STRIP-MCNC: NEGATIVE MG/DL
LEUKOCYTE ESTERASE UR QL STRIP: NEGATIVE
MCH RBC QN AUTO: 28 PG (ref 26.8–34.3)
MCHC RBC AUTO-ENTMCNC: 31.5 G/DL (ref 31.4–37.4)
MCV RBC AUTO: 89 FL (ref 82–98)
MUCOUS THREADS UR QL AUTO: ABNORMAL
NITRITE UR QL STRIP: NEGATIVE
NON-SQ EPI CELLS URNS QL MICRO: ABNORMAL /HPF
PH UR STRIP.AUTO: 5.5 [PH] (ref 4.5–8)
PLATELET # BLD AUTO: 208 THOUSANDS/UL (ref 149–390)
PMV BLD AUTO: 10.3 FL (ref 8.9–12.7)
POTASSIUM SERPL-SCNC: 3.9 MMOL/L (ref 3.5–5.3)
PROT UR STRIP-MCNC: NEGATIVE MG/DL
QRS AXIS: -33 DEGREES
QRSD INTERVAL: 78 MS
QT INTERVAL: 414 MS
QTC INTERVAL: 456 MS
RBC # BLD AUTO: 3.96 MILLION/UL (ref 3.81–5.12)
RBC #/AREA URNS AUTO: ABNORMAL /HPF
SODIUM SERPL-SCNC: 139 MMOL/L (ref 136–145)
SP GR UR STRIP.AUTO: <=1.005 (ref 1–1.03)
T WAVE AXIS: 81 DEGREES
UROBILINOGEN UR QL STRIP.AUTO: 0.2 E.U./DL
VENTRICULAR RATE: 73 BPM
WBC # BLD AUTO: 6.66 THOUSAND/UL (ref 4.31–10.16)
WBC #/AREA URNS AUTO: ABNORMAL /HPF

## 2018-05-28 PROCEDURE — 81001 URINALYSIS AUTO W/SCOPE: CPT | Performed by: EMERGENCY MEDICINE

## 2018-05-28 PROCEDURE — 99222 1ST HOSP IP/OBS MODERATE 55: CPT | Performed by: INTERNAL MEDICINE

## 2018-05-28 PROCEDURE — 80048 BASIC METABOLIC PNL TOTAL CA: CPT | Performed by: NURSE PRACTITIONER

## 2018-05-28 PROCEDURE — 99233 SBSQ HOSP IP/OBS HIGH 50: CPT | Performed by: INTERNAL MEDICINE

## 2018-05-28 PROCEDURE — 93010 ELECTROCARDIOGRAM REPORT: CPT | Performed by: INTERNAL MEDICINE

## 2018-05-28 PROCEDURE — 85027 COMPLETE CBC AUTOMATED: CPT | Performed by: NURSE PRACTITIONER

## 2018-05-28 PROCEDURE — 85018 HEMOGLOBIN: CPT | Performed by: INTERNAL MEDICINE

## 2018-05-28 PROCEDURE — C9113 INJ PANTOPRAZOLE SODIUM, VIA: HCPCS | Performed by: INTERNAL MEDICINE

## 2018-05-28 RX ORDER — METRONIDAZOLE 500 MG/1
500 TABLET ORAL EVERY 8 HOURS SCHEDULED
Status: DISCONTINUED | OUTPATIENT
Start: 2018-05-28 | End: 2018-05-30 | Stop reason: HOSPADM

## 2018-05-28 RX ORDER — PANTOPRAZOLE SODIUM 40 MG/1
40 TABLET, DELAYED RELEASE ORAL
Status: DISCONTINUED | OUTPATIENT
Start: 2018-05-28 | End: 2018-05-30 | Stop reason: HOSPADM

## 2018-05-28 RX ORDER — SACCHAROMYCES BOULARDII 250 MG
250 CAPSULE ORAL 2 TIMES DAILY
Status: DISCONTINUED | OUTPATIENT
Start: 2018-05-28 | End: 2018-05-30 | Stop reason: HOSPADM

## 2018-05-28 RX ORDER — SUCRALFATE ORAL 1 G/10ML
1000 SUSPENSION ORAL EVERY 6 HOURS SCHEDULED
Status: DISCONTINUED | OUTPATIENT
Start: 2018-05-28 | End: 2018-05-30 | Stop reason: HOSPADM

## 2018-05-28 RX ORDER — HALOPERIDOL 5 MG/ML
2 INJECTION INTRAMUSCULAR EVERY 6 HOURS PRN
Status: DISCONTINUED | OUTPATIENT
Start: 2018-05-28 | End: 2018-05-30 | Stop reason: HOSPADM

## 2018-05-28 RX ADMIN — VALSARTAN 320 MG: 160 TABLET, FILM COATED ORAL at 08:57

## 2018-05-28 RX ADMIN — Medication 500 MCG: at 08:57

## 2018-05-28 RX ADMIN — PANTOPRAZOLE SODIUM 40 MG: 40 INJECTION, POWDER, FOR SOLUTION INTRAVENOUS at 08:58

## 2018-05-28 RX ADMIN — METRONIDAZOLE 500 MG: 500 TABLET ORAL at 10:10

## 2018-05-28 RX ADMIN — PANTOPRAZOLE SODIUM 40 MG: 40 TABLET, DELAYED RELEASE ORAL at 16:24

## 2018-05-28 RX ADMIN — METRONIDAZOLE 500 MG: 500 TABLET ORAL at 17:43

## 2018-05-28 RX ADMIN — SUCRALFATE 1000 MG: 1 SUSPENSION ORAL at 23:03

## 2018-05-28 RX ADMIN — QUETIAPINE FUMARATE 25 MG: 25 TABLET ORAL at 23:02

## 2018-05-28 RX ADMIN — MEMANTINE HYDROCHLORIDE 5 MG: 5 TABLET ORAL at 08:57

## 2018-05-28 RX ADMIN — Medication 250 MG: at 17:43

## 2018-05-28 RX ADMIN — AMLODIPINE BESYLATE 10 MG: 10 TABLET ORAL at 08:58

## 2018-05-28 RX ADMIN — DONEPEZIL HYDROCHLORIDE 5 MG: 5 TABLET ORAL at 23:02

## 2018-05-28 RX ADMIN — SUCRALFATE 1000 MG: 1 SUSPENSION ORAL at 16:24

## 2018-05-28 RX ADMIN — MEMANTINE HYDROCHLORIDE 5 MG: 5 TABLET ORAL at 17:43

## 2018-05-28 RX ADMIN — ATENOLOL 50 MG: 50 TABLET ORAL at 08:58

## 2018-05-28 NOTE — PROGRESS NOTES
[facility]'s Internal Medicine Progress Note  Patient: Bob Johnson 80 y o  female   MRN: [id number]  PCP: SEA Marie  Unit/Bed#: -01 Encounter: [id number]  Date Of Visit: 05/28/18    Assessment/Plan:    Principal Problem:    C  difficile diarrhea  Active Problems:    Atrial fibrillation ([address] )    Alzheimer's dementia with behavioral disturbance    Falls    Melena    Present on Admission:   Alzheimer's dementia with behavioral disturbance   Atrial fibrillation (HCC)   Falls   Melena   C  difficile diarrhea      · C diff diarrhea  Started patient on Flagyl  Doubt that she is having any significant GI bleeding issues  Patient to go for EGD as per GI service as I discussed with Dr Herve Bowling  Continue with other treatment  · Dementia with agitation  Patient lives in a locked up unit  She would go back there  Would continue current treatment and also added Haldol p r n  and would discontinue Ativan  · History of atrial fibrillation  Heart rate and rhythm are stable  Patient high risk for falls  Probably not a safe candidate for chronic anticoagulation  Eliquis on hold in the setting of melena  · Essential hypertension  Continue with home medications including losartan  · Chronic kidney disease stage 3  Creatinine around 1 38  This was in June of last year  Then it went down to 1 13 and 1 18  On admission it was 1 57  After some hydration as she allowed some IV fluids, it is down to 1 41  I do not believe that she needs any further workup for that  · Melena-her hemoglobin has remained stable and actually had gone up initially  On PPI  Eliquis on hold  For EGD tomorrow  · Lactic acidosis-likely secondary to dehydration    Do not see any evidence of infection      VTE Pharmacologic Prophylaxis:   Pharmacologic: Pharmacologic VTE Prophylaxis contraindicated due to Melena  Mechanical VTE Prophylaxis in Place: Yes when she allows    Patient Centered Rounds: I have performed bedside rounds with nursing staff today  Discussions with Specialists or Other Care Team Provider:  Yes    Education and Discussions with Family / Patient:  Yes    Time Spent for Care: 35+ minutes  More than 50% of total time spent on counseling and coordination of care as described above  Current Length of Stay: 1 day(s)    Current Patient Status: Inpatient   Certification Statement: The patient will continue to require additional inpatient hospital stay due to C diff diarrhea/melena    Discharge Plan:  Back to her facility when stable    Code Status: Level 1 - Full Code      Subjective:   Patient is a poor historian as she has underlying dementia  She denied any symptoms of pain to me  She also denied that she has any diarrhea although as per staff, she has been having diarrhea and at times is incontinent of the stools  Yesterday, she was agitated and received Haldol with good response  Objective:     Vitals:   Temp (24hrs), Av 9 °F (36 6 °C), Min:97 5 °F (36 4 °C), Max:98 9 °F (37 2 °C)    HR:  [80-89] 89  Resp:  [16-18] 18  BP: (114-127)/(60-65) 125/60  SpO2:  [96 %-100 %] 96 %  Body mass index is 24 33 kg/m²  Input and Output Summary (last 24 hours): Intake/Output Summary (Last 24 hours) at 18 0919  Last data filed at 18 6576   Gross per 24 hour   Intake              460 ml   Output                0 ml   Net              460 ml           Physical Exam:     Vital signs are reviewed as above  Constitutional:  Pleasantly confused 80years old female who was lying in bed initially and then she just started rumbling/wanted to get her shoes on and walk   Not really following commands  Eyes: EOM grossly intact  Conjunctivae slightly pale  Patient has anicteric sclera  HENT: Oropharynx are moist    Neck: Neck is supple  Cardiac: I did not hear any rubs or gallop  Patient has irregularly irregular  rhythm  Respiratory: Patient not in significant respiratory distress   Air entry in general anterolaterally  GI: Abdomen is soft  It is grossly nontender  Bowel sounds are adequate  I was not able to appreciate any hepatosplenomegaly  Neurologic:  Patient is awake and alert  Neurological examination is grossly intact  No obvious focal neurological deficit noticed  Skin: Skin is warm and dry  Psychiatric:  Was laying in bed and calm initially and then became really agitated/uncontrollable   Musculoskeletal  Patient moving all extremities   Has chronic arthritic joints   Extremities: Patient has no significant cyanosis, clubbing, or lower extremity edema       Additional Data:     Labs:      Results from last 7 days  Lab Units 05/28/18  0546  05/27/18  1007   WBC Thousand/uL 6 66  --  7 97   HEMOGLOBIN g/dL 11 1*  < > 11 2*   HEMATOCRIT % 35 2  < > 35 7   PLATELETS Thousands/uL 208  --  223   NEUTROS PCT %  --   --  82*   LYMPHS PCT %  --   --  8*   MONOS PCT %  --   --  8   EOS PCT %  --   --  1   < > = values in this interval not displayed  Results from last 7 days  Lab Units 05/28/18  0546 05/27/18  1007   SODIUM mmol/L 139 142   POTASSIUM mmol/L 3 9 3 8   CHLORIDE mmol/L 109* 109*   CO2 mmol/L 21 22   BUN mg/dL 21 20   CREATININE mg/dL 1 41* 1 57*   CALCIUM mg/dL 8 1* 8 3   TOTAL PROTEIN g/dL  --  5 8*   BILIRUBIN TOTAL mg/dL  --  0 50   ALK PHOS U/L  --  96   ALT U/L  --  12   AST U/L  --  14   GLUCOSE RANDOM mg/dL 102 143*       Results from last 7 days  Lab Units 05/27/18  1007   INR  1 22*       * I Have Reviewed All Lab Data Listed Above  * Additional Pertinent Lab Tests Reviewed: All Labs Within Last 24 Hours Reviewed      Recent Cultures (last 7 days):       Results from last 7 days  Lab Units 05/27/18  1249   C DIFF TOXIN B  POSITIVE for C difficle toxin by PCR  *       Last 24 Hours Medication List:     Current Facility-Administered Medications:  acetaminophen 650 mg Oral Q6H PRN SEA Fuentes    amLODIPine 10 mg Oral Daily SEA Fuentes    atenolol 50 mg Oral Daily Emilia Royal, SEA    cyanocobalamin 500 mcg Oral Daily SEA Chaudhry    donepezil 5 mg Oral HS Emilia Royal, SEA    LORazepam 0 5 mg Oral Q8H PRN Emilia Royal, CRJESSICA    memantine 5 mg Oral BID Emilia Royal, SEA    metroNIDAZOLE 500 mg Oral [facility] Elodia Brunner MD    ondansetron 4 mg Oral Q8H PRN Emilia Royal, SEA    pantoprazole 40 mg Intravenous Q12H [address] Constantino Mcguire MD    QUEtiapine 25 mg Oral HS SEA Chaudhry    sodium chloride 75 mL/hr Intravenous Continuous Constantino Mcguire MD Last Rate: 75 mL/hr (05/27/18 7516)   valsartan 320 mg Oral Daily SEA Chaudhry         Today, Patient Was Seen By: Elodia Brunner MD    ** Please Note: Dragon 360 Dictation voice to text software may have been used in the creation of this document   **

## 2018-05-28 NOTE — CONSULTS
Consultation - [facility]  Montez Waldron 80 y o  female MRN: [id number]  Unit/Bed#: -01 Encounter: [id number]      Inpatient consult to gastroenterology  Consult performed by: Angela Smith  Consult ordered by: Rachana Banegas           Reason for Consult / Principal Problem:  Melena and C diff    HPI: Montez Waldron is a 80y o  year old female who presents with melena  The patient was sent in from the nursing home for black tarry bowel movements  In addition she was having diarrhea  The patient is C diff positive  I just got off the phone with her son and obtained most of the history from him and the nurse  The patient does have dementia and is not able to give a history at all  He reports that she had C diff a week and half ago at the nursing home and was treated with antibiotics and got better  He reports that she has not had a history of ulcer bleeding  The patient had melena yesterday that was witnessed in today she has had 4 diarrhea episodes that are both black and brown  The patient is completely confused  She denies heartburn dysphagia nausea vomiting or abdominal pain  Her hemoglobin is 11 6 at present  She has been able to tolerate the Protonix drip because she is ripping out all of her IVs   She is on Eliquis for atrial fibrillation and this was stopped on admission  The patient is not known to be on any antiplatelet agents  REVIEW OF SYSTEMS:     CONSTITUTIONAL: Denies any fever, chills, or rigors  Good appetite, and no recent weight loss  HEENT: No earache or tinnitus  Denies hearing loss or visual disturbances  CARDIOVASCULAR: No chest pain or palpitations  RESPIRATORY: Denies any cough, hemoptysis, shortness of breath or dyspnea on exertion  GASTROINTESTINAL: As noted in the History of Present Illness  GENITOURINARY: No problems with urination  Denies any hematuria or dysuria  NEUROLOGIC: No dizziness or vertigo, denies headaches  MUSCULOSKELETAL: Denies any muscle or joint pain  SKIN: Denies skin rashes or itching  ENDOCRINE: Denies excessive thirst  Denies intolerance to heat or cold  PSYCHOSOCIAL: Denies depression or anxiety  Denies any recent memory loss  Historical Information   Past Medical History:   Diagnosis Date    Alzheimer disease     Arterial occlusion     left leg    Atrial [address] [address]     Cardiac disease     Dementia     Hypertension     Renal disorder      History reviewed  No pertinent surgical history    Social History   History   Alcohol Use No     History   Drug Use No     History   Smoking Status    Never Smoker   Smokeless Tobacco    Never Used     Family History   Problem Relation Age of Onset    Hypertension Mother        Meds/Allergies     Prescriptions Prior to Admission   Medication    acetaminophen (TYLENOL) 325 mg tablet    amLODIPine (NORVASC) 10 mg tablet    apixaban (ELIQUIS) 5 mg    atenolol (TENORMIN) 50 mg tablet    cyanocobalamin (VITAMIN B-12) 500 mcg tablet    donepezil (ARICEPT) 5 mg tablet    guaiFENesin (ROBITUSSIN) 100 mg/5 mL syrup    loperamide (IMODIUM A-D) 2 MG tablet    LORazepam (ATIVAN) 0 5 mg tablet    memantine (NAMENDA) 5 mg tablet    mupirocin (BACTROBAN) 2 % ointment    ondansetron (ZOFRAN) 4 mg tablet    QUEtiapine (SEROquel) 25 mg tablet    valsartan (DIOVAN) 320 MG tablet     Current Facility-Administered Medications   Medication Dose Route Frequency    acetaminophen (TYLENOL) tablet 650 mg  650 mg Oral Q6H PRN    amLODIPine (NORVASC) tablet 10 mg  10 mg Oral Daily    atenolol (TENORMIN) tablet 50 mg  50 mg Oral Daily    cyanocobalamin (VITAMIN B-12) tablet 500 mcg  500 mcg Oral Daily    donepezil (ARICEPT) tablet 5 mg  5 mg Oral HS    haloperidol lactate (HALDOL) injection 2 mg  2 mg Intramuscular Q6H PRN    LORazepam (ATIVAN) tablet 0 5 mg  0 5 mg Oral Q8H PRN    memantine (NAMENDA) tablet 5 mg  5 mg Oral BID    metroNIDAZOLE (FLAGYL) tablet 500 mg  500 mg Oral Q8H [facility]    ondansetron (ZOFRAN-ODT) dispersible tablet 4 mg  4 mg Oral Q8H PRN    pantoprazole (PROTONIX) EC tablet 40 mg  40 mg Oral BID AC    QUEtiapine (SEROquel) tablet 25 mg  25 mg Oral HS    saccharomyces boulardii (FLORASTOR) capsule 250 mg  250 mg Oral BID    sodium chloride 0 9 % infusion  75 mL/hr Intravenous Continuous    sucralfate (CARAFATE) oral suspension 1,000 mg  1,000 mg Oral Q6H [facility]    valsartan (DIOVAN) tablet 320 mg  320 mg Oral Daily       No Known Allergies    Objective   Blood pressure 125/60, pulse 89, temperature 97 5 °F (36 4 °C), temperature source Oral, resp  rate 18, weight 58 4 kg (128 lb 12 oz), SpO2 96 %  Intake/Output Summary (Last 24 hours) at 05/28/18 1430  Last data filed at 05/28/18 1252   Gross per 24 hour   Intake              700 ml   Output              200 ml   Net              500 ml         PHYSICAL EXAM:      General Appearance:   Confused and agitated   HEENT:   Normocephalic, atraumatic, anicteric      Neck:  Supple, symmetrical, trachea midline, no adenopathy;    thyroid: no enlargement/tenderness/nodules; no carotid  bruit or JVD    Lungs:   Clear to auscultation bilaterally; no rales, rhonchi or wheezing; respirations unlabored    Heart[de-identified]   S1 and S2 normal; regular rate and rhythm; no murmur, rub, or gallop     Abdomen:   Soft, non-tender, non-distended; normal bowel sounds; no masses, no organomegaly    Genitalia:   Deferred    Rectal:   Deferred    Extremities:  No cyanosis, clubbing or edema    Pulses:  2+ and symmetric all extremities    Skin:  Skin color, texture, turgor normal, no rashes or lesions    Lymph nodes:  No palpable cervical, axillary or inguinal lymphadenopathy        Lab Results:   Admission on 05/27/2018   Component Date Value    WBC 05/27/2018 7 97     RBC 05/27/2018 4 01     Hemoglobin 05/27/2018 11 2*    Hematocrit 05/27/2018 35 7     MCV 05/27/2018 89     MCH 05/27/2018 27 9     MCHC 05/27/2018 31 4  RDW 05/27/2018 15 3*    MPV 05/27/2018 10 5     Platelets 80/90/9086 223     nRBC 05/27/2018 0     Neutrophils Relative 05/27/2018 82*    Immat GRANS % 05/27/2018 0     Lymphocytes Relative 05/27/2018 8*    Monocytes Relative 05/27/2018 8     Eosinophils Relative 05/27/2018 1     Basophils Relative 05/27/2018 1     Neutrophils Absolute 05/27/2018 6 56     Immature Grans Absolute 05/27/2018 0 02     Lymphocytes Absolute 05/27/2018 0 64     Monocytes Absolute 05/27/2018 0 60     Eosinophils Absolute 05/27/2018 0 11     Basophils Absolute 05/27/2018 0 04     Sodium 05/27/2018 142     Potassium 05/27/2018 3 8     Chloride 05/27/2018 109*    CO2 05/27/2018 22     Anion Gap 05/27/2018 11     BUN 05/27/2018 20     Creatinine 05/27/2018 1 57*    Glucose 05/27/2018 143*    Calcium 05/27/2018 8 3     AST 05/27/2018 14     ALT 05/27/2018 12     Alkaline Phosphatase 05/27/2018 96     Total Protein 05/27/2018 5 8*    Albumin 05/27/2018 2 8*    Total Bilirubin 05/27/2018 0 50     eGFR 05/27/2018 28     Protime 05/27/2018 15 3*    INR 05/27/2018 1 22*    PTT 05/27/2018 27     ABO Grouping 05/27/2018 A     Rh Factor 05/27/2018 Positive     Antibody Screen 05/27/2018 Negative     Specimen Expiration Date 05/27/2018 [id number]     Magnesium 05/27/2018 1 9     LACTIC ACID 05/27/2018 2 2*    Hemoglobin 05/27/2018 12 8     Hematocrit 05/27/2018 40 3     C difficile toxin by PCR 05/27/2018 POSITIVE for C difficle toxin by PCR  *    Hemoglobin 05/27/2018 12 6     Hematocrit 05/27/2018 39 7     Sodium 05/28/2018 139     Potassium 05/28/2018 3 9     Chloride 05/28/2018 109*    CO2 05/28/2018 21     Anion Gap 05/28/2018 9     BUN 05/28/2018 21     Creatinine 05/28/2018 1 41*    Glucose 05/28/2018 102     Calcium 05/28/2018 8 1*    eGFR 05/28/2018 32     WBC 05/28/2018 6 66     RBC 05/28/2018 3 96     Hemoglobin 05/28/2018 11 1*    Hematocrit 05/28/2018 35 2     MCV 05/28/2018 89  MCH 05/28/2018 28 0     MCHC 05/28/2018 31 5     RDW 05/28/2018 15 3*    Platelets 82/34/9540 208     MPV 05/28/2018 10 3     Ventricular Rate 05/27/2018 73     Atrial Rate 05/27/2018 178     QRSD Interval 05/27/2018 78     QT Interval 05/27/2018 414     QTC Interval 05/27/2018 456     QRS Axis 05/27/2018 -35     T Wave Axis 05/27/2018 81        Imaging Studies: I have personally reviewed pertinent imaging studies  ASSESSMENT & PLAN:  Principal Problem:    C  difficile diarrhea  Active Problems:    Atrial fibrillation (HCC)    Alzheimer's dementia with behavioral disturbance    Falls    Melena    Anemia    GI bleed    She is a 12-year-old female with recurrent Clostridium difficile infection in addition to melena consistent with an upper GI bleed  The patient does have severe dimension is been ripping out all of her intravenous lines and medications  I did spend some time talking to her son on the telephone regarding her treatment and prognosis  1 I am going to check another hemoglobin now to make sure she is stable to wait for endoscopy tomorrow    2 I am going to put her on for an endoscopy tomorrow; her son tells me that he will be available by phone for the gastroenterologist and the anesthesiologist to call for consent in the afternoon    He told me to use the mobile cell phone in the computer system    3 I am changing her Protonix drip to Protonix 40 mg p o  b i d  in addition to Carafate 1 g slurry q i d     4 I am recommending Flagyl 500 mg p o  t i d  for a 10 day course; I am adding Florastor; contact precautions    5 CBC in a m  NPO after midnight    Christian Guido MD  5/28/2018,2:30 PM

## 2018-05-28 NOTE — CONSULTS
Consultation - Cardiology    [facility] 80 y o  female MRN: [id number]  Unit/Bed#: -01 Encounter: [id number]    Physician Requesting Consult: Karri Aj MD    Reason for Consult / Principal Problem:, atrial fibrillation Anticoagulated, anemia    HPI: [facility] BILL is a 80y o  year old female with a past medical history significant for atrial fibrillation, dementia  Patient presented from long-term skilled nursing facility as she was noted to have dark and bloody stool  Patient is pleasantly demented and does not offer much in regards to history  She reports feeling well at this time and has no complaints  She denies chest pain, palpitations, shortness of breath, abdominal pain, fever/chills  Historical Information   Past Medical History:   Diagnosis Date    Alzheimer disease     Arterial occlusion     left leg    Atrial fibrillation ([address] )     Cardiac disease     Dementia     Hypertension     Renal disorder      History reviewed  No pertinent surgical history    History   Alcohol Use No     History   Drug Use No     History   Smoking Status    Never Smoker   Smokeless Tobacco    Never Used       FAMILY HISTORY:  Family History   Problem Relation Age of Onset    Hypertension Mother        MEDS & ALLERGIES:  all current active meds have been reviewed and current meds:   Current Facility-Administered Medications   Medication Dose Route Frequency    acetaminophen (TYLENOL) tablet 650 mg  650 mg Oral Q6H PRN    amLODIPine (NORVASC) tablet 10 mg  10 mg Oral Daily    atenolol (TENORMIN) tablet 50 mg  50 mg Oral Daily    cyanocobalamin (VITAMIN B-12) tablet 500 mcg  500 mcg Oral Daily    donepezil (ARICEPT) tablet 5 mg  5 mg Oral HS    LORazepam (ATIVAN) tablet 0 5 mg  0 5 mg Oral Q8H PRN    memantine (NAMENDA) tablet 5 mg  5 mg Oral BID    metroNIDAZOLE (FLAGYL) tablet 500 mg  500 mg Oral Q8H [facility]    ondansetron (ZOFRAN-ODT) dispersible tablet 4 mg  4 mg Oral Q8H PRN    pantoprazole (PROTONIX) injection 40 mg  40 mg Intravenous Q12H [facility]    QUEtiapine (SEROquel) tablet 25 mg  25 mg Oral HS    sodium chloride 0 9 % infusion  75 mL/hr Intravenous Continuous    valsartan (DIOVAN) tablet 320 mg  320 mg Oral Daily       sodium chloride 75 mL/hr Last Rate: 75 mL/hr (05/27/18 1756)     No Known Allergies      REVIEW OF SYSTEMS:  Constitutional: Denies fever or chills  Eyes: Denies visual disturbance change in visual acuity   HENT: Denies nasal congestion or sore throat   Respiratory: Denies cough, expectoration or shortness of breath   Cardiovascular: Denies chest pain, palpitations or lower extremity swelling   GI: Denies abdominal pain, nausea, vomiting  : Denies dysuria, frequency, hematuria  Musculoskeletal: Denies back pain or joint pain   Neurologic: Denies lightheadedness, syncope, headache, focal weakness or sensory changes   Psychiatric: Denies depression, anxiety or panic       PHYSICAL EXAM:  Vitals:   Vitals:    05/28/18 0700   BP: 125/60   Pulse: 89   Resp: 18   Temp: 97 5 °F (36 4 °C)   SpO2: 96%     Body mass index is 24 33 kg/m²  Systolic (40EPZ), BQQ:724 , Min:114 , RNW:515     Diastolic (97RDN), DZE:38, Min:60, Max:65      Intake/Output Summary (Last 24 hours) at 05/28/18 0829  Last data filed at 05/28/18 0826   Gross per 24 hour   Intake              460 ml   Output                0 ml   Net              460 ml     Weight (last 2 days)     Date/Time   Weight    05/27/18 0955  58 4 (128 75)            Invasive Devices     Peripheral Intravenous Line            Peripheral IV 05/27/18 Right Forearm less than 1 day                General: Patient is not in acute distress  Laying comfortably in the bed  Head: Normocephalic  Atraumatic  HEENT: Both pupils normal sized, round and reactive to light  Nonicteric  Neck: JVP not elevated  Trachea central    Respiratory: Bilateral bronchovascular breath sounds with no crackles or rhonchi  Cardiovascular:  Irregular   S1 and S2 normal  No murmur, rub or gallop  GI: Abdomen soft, nontender  No guarding or rigidity  Neurologic: Patient is awake, alert, responding to commands  Well-oriented to time, place and person  Moving all extremities  Extremities: No clubbing, cyanosis or edema  Integument: No skin rashes or ulceration      LABORATORY RESULTS:        CBC with diff:   Results from last 7 days  Lab Units 05/28/18  0546 05/27/18  1812 05/27/18  1239 05/27/18  1007   WBC Thousand/uL 6 66  --   --  7 97   HEMOGLOBIN g/dL 11 1* 12 6 12 8 11 2*   HEMATOCRIT % 35 2 39 7 40 3 35 7   MCV fL 89  --   --  89   PLATELETS Thousands/uL 208  --   --  223   MCH pg 28 0  --   --  27 9   MCHC g/dL 31 5  --   --  31 4   RDW % 15 3*  --   --  15 3*   MPV fL 10 3  --   --  10 5   NRBC AUTO /100 WBCs  --   --   --  0       CMP:  Results from last 7 days  Lab Units 05/28/18  0546 05/27/18  1007   SODIUM mmol/L 139 142   POTASSIUM mmol/L 3 9 3 8   CHLORIDE mmol/L 109* 109*   CO2 mmol/L 21 22   ANION GAP mmol/L 9 11   BUN mg/dL 21 20   CREATININE mg/dL 1 41* 1 57*   GLUCOSE RANDOM mg/dL 102 143*   CALCIUM mg/dL 8 1* 8 3   AST U/L  --  14   ALT U/L  --  12   ALK PHOS U/L  --  96   TOTAL PROTEIN g/dL  --  5 8*   BILIRUBIN TOTAL mg/dL  --  0 50   EGFR ml/min/1 73sq m 32 28       BMP:  Results from last 7 days  Lab Units 05/28/18  0546 05/27/18  1007   SODIUM mmol/L 139 142   POTASSIUM mmol/L 3 9 3 8   CHLORIDE mmol/L 109* 109*   CO2 mmol/L 21 22   BUN mg/dL 21 20   CREATININE mg/dL 1 41* 1 57*   GLUCOSE RANDOM mg/dL 102 143*   CALCIUM mg/dL 8 1* 8 3              Results from last 7 days  Lab Units 05/27/18  1007   MAGNESIUM mg/dL 1 9               Results from last 7 days  Lab Units 05/27/18  1007   INR  1 22*           Imaging: I have personally reviewed pertinent reports        EKG reviewed personally:  Atrial fibrillation, rate 73, LAD, anterior infarct      Assessment and Plan:    Atrial fibrillation  -rate controlled on atenolol  -was on Eliquis for anticoagulation as outpatient  She presented with melena, however hemoglobin has been stable  GI consult pending  Would await their evaluation  May hold off on anticoagulation at this time  If Eliquis is to be resumed, would start at 2 5 mg twice daily    Hypertension  -blood pressure stable, continue present regimen    Code Status: Level 1 - Full Code      Thank you for allowing us to participate in this patient's care  Counseling / Coordination of Care  Total floor / unit time spent today 35 minutes  Greater than 50% of total time was spent with the patient and / or family counseling and / or coordination of care  A description of the counseling / coordination of care: Review of history, current assessment, development of a plan         Brain JOHN Snow  5/28/2018,8:29 AM

## 2018-05-29 ENCOUNTER — ANESTHESIA EVENT (INPATIENT)
Dept: PERIOP | Facility: HOSPITAL | Age: 83
DRG: 372 | End: 2018-05-29
Payer: MEDICARE

## 2018-05-29 ENCOUNTER — ANESTHESIA (INPATIENT)
Dept: PERIOP | Facility: HOSPITAL | Age: 83
DRG: 372 | End: 2018-05-29
Payer: MEDICARE

## 2018-05-29 PROBLEM — I10 HYPERTENSION: Status: ACTIVE | Noted: 2018-05-29

## 2018-05-29 PROBLEM — I48.20 CHRONIC ATRIAL FIBRILLATION (HCC): Status: ACTIVE | Noted: 2017-06-29

## 2018-05-29 PROBLEM — N18.30 CKD (CHRONIC KIDNEY DISEASE) STAGE 3, GFR 30-59 ML/MIN (HCC): Status: ACTIVE | Noted: 2018-05-29

## 2018-05-29 LAB
ANION GAP SERPL CALCULATED.3IONS-SCNC: 11 MMOL/L (ref 4–13)
BASOPHILS # BLD AUTO: 0.04 THOUSANDS/ΜL (ref 0–0.1)
BASOPHILS NFR BLD AUTO: 1 % (ref 0–1)
BUN SERPL-MCNC: 20 MG/DL (ref 5–25)
CALCIUM SERPL-MCNC: 7.9 MG/DL (ref 8.3–10.1)
CHLORIDE SERPL-SCNC: 106 MMOL/L (ref 100–108)
CO2 SERPL-SCNC: 21 MMOL/L (ref 21–32)
CREAT SERPL-MCNC: 1.25 MG/DL (ref 0.6–1.3)
EOSINOPHIL # BLD AUTO: 0.15 THOUSAND/ΜL (ref 0–0.61)
EOSINOPHIL NFR BLD AUTO: 2 % (ref 0–6)
ERYTHROCYTE [DISTWIDTH] IN BLOOD BY AUTOMATED COUNT: 15.1 % (ref 11.6–15.1)
GFR SERPL CREATININE-BSD FRML MDRD: 37 ML/MIN/1.73SQ M
GLUCOSE SERPL-MCNC: 103 MG/DL (ref 65–140)
HCT VFR BLD AUTO: 35.6 % (ref 34.8–46.1)
HGB BLD-MCNC: 11.6 G/DL (ref 11.5–15.4)
IMM GRANULOCYTES # BLD AUTO: 0.02 THOUSAND/UL (ref 0–0.2)
IMM GRANULOCYTES NFR BLD AUTO: 0 % (ref 0–2)
LYMPHOCYTES # BLD AUTO: 0.65 THOUSANDS/ΜL (ref 0.6–4.47)
LYMPHOCYTES NFR BLD AUTO: 11 % (ref 14–44)
MCH RBC QN AUTO: 28.3 PG (ref 26.8–34.3)
MCHC RBC AUTO-ENTMCNC: 32.6 G/DL (ref 31.4–37.4)
MCV RBC AUTO: 87 FL (ref 82–98)
MONOCYTES # BLD AUTO: 0.63 THOUSAND/ΜL (ref 0.17–1.22)
MONOCYTES NFR BLD AUTO: 10 % (ref 4–12)
NEUTROPHILS # BLD AUTO: 4.64 THOUSANDS/ΜL (ref 1.85–7.62)
NEUTS SEG NFR BLD AUTO: 76 % (ref 43–75)
NRBC BLD AUTO-RTO: 0 /100 WBCS
PLATELET # BLD AUTO: 214 THOUSANDS/UL (ref 149–390)
PMV BLD AUTO: 10.5 FL (ref 8.9–12.7)
POTASSIUM SERPL-SCNC: 3.6 MMOL/L (ref 3.5–5.3)
RBC # BLD AUTO: 4.1 MILLION/UL (ref 3.81–5.12)
SODIUM SERPL-SCNC: 138 MMOL/L (ref 136–145)
WBC # BLD AUTO: 6.13 THOUSAND/UL (ref 4.31–10.16)

## 2018-05-29 PROCEDURE — 88313 SPECIAL STAINS GROUP 2: CPT | Performed by: PATHOLOGY

## 2018-05-29 PROCEDURE — 80048 BASIC METABOLIC PNL TOTAL CA: CPT | Performed by: INTERNAL MEDICINE

## 2018-05-29 PROCEDURE — 99233 SBSQ HOSP IP/OBS HIGH 50: CPT | Performed by: INTERNAL MEDICINE

## 2018-05-29 PROCEDURE — 88305 TISSUE EXAM BY PATHOLOGIST: CPT | Performed by: PATHOLOGY

## 2018-05-29 PROCEDURE — 43239 EGD BIOPSY SINGLE/MULTIPLE: CPT | Performed by: INTERNAL MEDICINE

## 2018-05-29 PROCEDURE — 88341 IMHCHEM/IMCYTCHM EA ADD ANTB: CPT | Performed by: PATHOLOGY

## 2018-05-29 PROCEDURE — 0DB68ZX EXCISION OF STOMACH, VIA NATURAL OR ARTIFICIAL OPENING ENDOSCOPIC, DIAGNOSTIC: ICD-10-PCS | Performed by: INTERNAL MEDICINE

## 2018-05-29 PROCEDURE — 85025 COMPLETE CBC W/AUTO DIFF WBC: CPT | Performed by: INTERNAL MEDICINE

## 2018-05-29 PROCEDURE — 88342 IMHCHEM/IMCYTCHM 1ST ANTB: CPT | Performed by: PATHOLOGY

## 2018-05-29 RX ORDER — SODIUM CHLORIDE, SODIUM LACTATE, POTASSIUM CHLORIDE, CALCIUM CHLORIDE 600; 310; 30; 20 MG/100ML; MG/100ML; MG/100ML; MG/100ML
INJECTION, SOLUTION INTRAVENOUS CONTINUOUS PRN
Status: DISCONTINUED | OUTPATIENT
Start: 2018-05-29 | End: 2018-05-29 | Stop reason: SURG

## 2018-05-29 RX ORDER — PROPOFOL 10 MG/ML
INJECTION, EMULSION INTRAVENOUS AS NEEDED
Status: DISCONTINUED | OUTPATIENT
Start: 2018-05-29 | End: 2018-05-29 | Stop reason: SURG

## 2018-05-29 RX ADMIN — MEMANTINE HYDROCHLORIDE 5 MG: 5 TABLET ORAL at 08:55

## 2018-05-29 RX ADMIN — SUCRALFATE 1000 MG: 1 SUSPENSION ORAL at 18:42

## 2018-05-29 RX ADMIN — HALOPERIDOL LACTATE 2 MG: 5 INJECTION, SOLUTION INTRAMUSCULAR at 21:49

## 2018-05-29 RX ADMIN — SUCRALFATE 1000 MG: 1 SUSPENSION ORAL at 12:15

## 2018-05-29 RX ADMIN — SUCRALFATE 1000 MG: 1 SUSPENSION ORAL at 06:10

## 2018-05-29 RX ADMIN — PANTOPRAZOLE SODIUM 40 MG: 40 TABLET, DELAYED RELEASE ORAL at 06:10

## 2018-05-29 RX ADMIN — SODIUM CHLORIDE, SODIUM LACTATE, POTASSIUM CHLORIDE, AND CALCIUM CHLORIDE: .6; .31; .03; .02 INJECTION, SOLUTION INTRAVENOUS at 15:42

## 2018-05-29 RX ADMIN — Medication 250 MG: at 18:42

## 2018-05-29 RX ADMIN — MEMANTINE HYDROCHLORIDE 5 MG: 5 TABLET ORAL at 18:42

## 2018-05-29 RX ADMIN — QUETIAPINE FUMARATE 25 MG: 25 TABLET ORAL at 21:43

## 2018-05-29 RX ADMIN — VALSARTAN 320 MG: 160 TABLET, FILM COATED ORAL at 08:56

## 2018-05-29 RX ADMIN — HALOPERIDOL LACTATE 2 MG: 5 INJECTION, SOLUTION INTRAMUSCULAR at 14:11

## 2018-05-29 RX ADMIN — METRONIDAZOLE 500 MG: 500 TABLET ORAL at 10:59

## 2018-05-29 RX ADMIN — METRONIDAZOLE 500 MG: 500 TABLET ORAL at 01:46

## 2018-05-29 RX ADMIN — DONEPEZIL HYDROCHLORIDE 5 MG: 5 TABLET ORAL at 21:43

## 2018-05-29 RX ADMIN — SUCRALFATE 1000 MG: 1 SUSPENSION ORAL at 01:39

## 2018-05-29 RX ADMIN — AMLODIPINE BESYLATE 10 MG: 10 TABLET ORAL at 08:55

## 2018-05-29 RX ADMIN — ATENOLOL 50 MG: 50 TABLET ORAL at 08:55

## 2018-05-29 RX ADMIN — Medication 500 MCG: at 08:55

## 2018-05-29 RX ADMIN — Medication 250 MG: at 08:55

## 2018-05-29 RX ADMIN — PROPOFOL 100 MG: 10 INJECTION, EMULSION INTRAVENOUS at 15:49

## 2018-05-29 RX ADMIN — METRONIDAZOLE 500 MG: 500 TABLET ORAL at 18:42

## 2018-05-29 NOTE — ASSESSMENT & PLAN NOTE
· Currently rate controlled  Continue atenolol  · Eliquis on hold secondary to GI bleed    · Cardiology following

## 2018-05-29 NOTE — PHYSICIAN ADVISOR
This patient is a 80 y o  y/o female who is admitted to the hospital for Black or Bloody Stool (as per EMS staff at the facility stated the pt had black, tarry stools  also report having "red bloody mucous mixed in " pt is on eliquis  pt is pleasantly confused with dementia and states she is not sick  )       The patient presented to the ED on 5/27/18 at [phone] and was admitted to the hospital on 5/27/2018 0989  History of Present Illness includes: Bradley Wyatt is a 80 y o  female who presents with known history of atrial fibrillation and dementia presenting from her long-term skilled facility with the facility itself was reporting dark stool and 1 episode of bloody stool with mucus  Patient on question is pleasantly confused denies any problems and does not know where she is at currently  No family at the bedside and reported by ED that the family is out of town to the holiday weekend  Vital signs in the ER are as follows ED Triage Vitals   Temperature Pulse Respirations Blood Pressure SpO2   05/27/18 0955 05/27/18 0955 05/27/18 0955 05/27/18 0955 05/27/18 0955   98 9 °F (37 2 °C) 83 16 114/65 100 %      Temp Source Heart Rate Source Patient Position - Orthostatic VS BP Location FiO2 (%)   05/27/18 0955 05/27/18 0955 05/27/18 0955 05/27/18 0955 --   Oral Monitor Lying Right arm       Pain Score       05/27/18 1146       No Pain             Treatment in the ER included: basic labs  guaiac positive stool  The patient is admitted as INPATIENT  and has remained hospitalized for 2 day(s)  Last vital signs were Blood pressure 140/60, pulse 83, temperature 97 5 °F (36 4 °C), temperature source Oral, resp  rate 18, weight 58 4 kg (128 lb 12 oz), SpO2 98 %  Treatment includes: serial labs, IVF, po flagyl, cardiology consult, PPI, GI consult and endoscopy      Results include:       Results from last 7 days  Lab Units 05/27/18  1007   LACTIC ACID mmol/L 2 2*         Results from last 7 days  Lab Units 05/29/18  0617 05/28/18  1431 05/28/18  0546 05/27/18  1812  05/27/18  1007   WBC Thousand/uL 6 13  --  6 66  --   --  7 97   HEMOGLOBIN g/dL 11 6 12 0 11 1* 12 6  < > 11 2*   HEMATOCRIT % 35 6  --  35 2 39 7  < > 35 7   PLATELETS Thousands/uL 214  --  208  --   --  223   < > = values in this interval not displayed  Results from last 7 days  Lab Units 05/29/18  0617 05/28/18  0546 05/27/18  1007   SODIUM mmol/L 138 139 142   POTASSIUM mmol/L 3 6 3 9 3 8   CHLORIDE mmol/L 106 109* 109*   CO2 mmol/L 21 21 22   BUN mg/dL 20 21 20   CREATININE mg/dL 1 25 1 41* 1 57*   GLUCOSE RANDOM mg/dL 103 102 143*   CALCIUM mg/dL 7 9* 8 1* 8 3       Recent Cultures:      Results from last 7 days  Lab Units 05/27/18  1249   C DIFF TOXIN B  POSITIVE for C difficle toxin by PCR  *       The patient is appropriate for  Inpatient Admission  The rationale is as follows: The patient is an elderly 81 y/o female who present with melena and diarrhea  She required serial labs, IVF, po flagyl, cardiology consult, PPI, GI consult and endoscopy  There is documentation by the admitting physician that the patient would require greater than two midnight stay based on medical necessity  Hospitalization is necessary to prevent further deterioration of her clinical condition  After review of the medical chart, labs, imaging, and notes - I agree that the patient meets criteria for INPATIENT ADMISSION

## 2018-05-29 NOTE — PLAN OF CARE
CONFUSION/THOUGHT DISTURBANCE     Thought disturbances (confusion, delirium, depression, dementia or psychosis) are managed to maintain or return to baseline mental status and functional level [address] Discharge to home or other facility with appropriate resources Progressing        GASTROINTESTINAL - ADULT     Minimal or absence of nausea and/or vomiting Progressing     Maintains or returns to baseline bowel function Progressing     Maintains adequate nutritional intake Progressing        INFECTION - ADULT     Absence or prevention of progression during hospitalization Progressing        Knowledge Deficit     Patient/family/caregiver demonstrates understanding of disease process, treatment plan, medications, and discharge instructions Progressing        METABOLIC, FLUID AND ELECTROLYTES - ADULT     Electrolytes maintained within normal limits Progressing     Fluid balance maintained Progressing        NEUROSENSORY - ADULT     Achieves stable or improved neurological status Progressing        PAIN - ADULT     Verbalizes/displays adequate comfort level or baseline comfort level Progressing        Potential for Falls     Patient will remain free of falls Progressing        Prexisting or High Potential for Compromised Skin Integrity     Skin integrity is maintained or improved Progressing        SAFETY ADULT     Patient will remain free of falls Progressing        SKIN/TISSUE INTEGRITY - ADULT     Skin integrity remains intact Progressing

## 2018-05-29 NOTE — CASE MANAGEMENT
Initial Clinical Review    Admission: Date/Time/Statement: 5/27/18 @ 1030     Orders Placed This Encounter   Procedures    Inpatient Admission (expected length of stay for this patient is greater than two midnights)     Standing Status:   Standing     Number of Occurrences:   1     Order Specific Question:   Admitting Physician     Answer:   Shawnee Rankin [[id number]]     Order Specific Question:   Level of Care     Answer:   Med Surg [[id number]]     Order Specific Question:   Estimated length of stay     Answer:   More than 2 Midnights     Order Specific Question:   Certification     Answer:   I certify that inpatient services are medically necessary for this patient for a duration of greater than two midnights  See H&P and MD Progress Notes for additional information about the patient's course of treatment  ED: Date/Time/Mode of Arrival:   ED Arrival Information     Expected Arrival Acuity Means of Arrival Escorted By Service Admission Type    - 5/27/2018 09:51 Urgent Ambulance [facility] General Medicine Urgent    Arrival Complaint    BLOOD IN STOOL          Chief Complaint:   Chief Complaint   Patient presents with    Black or Bloody Stool     as per EMS staff at the facility stated the pt had black, tarry stools  also report having "red bloody mucous mixed in " pt is on eliquis  pt is pleasantly confused with dementia and states she is not sick  History of Illness: Latia Corbin is a 80 y o  female who presents with known history of atrial fibrillation and dementia presenting from her long-term skilled facility with the facility itself was reporting dark stool and 1 episode of bloody stool with mucus  Patient on question is pleasantly confused denies any problems and does not know where she is at currently    No family at the bedside and reported by ED that the family is out of town to the holiday weekend        ED Vital Signs:   ED Triage Vitals   Temperature Pulse Respirations Blood Pressure SpO2 05/27/18 0955 05/27/18 0955 05/27/18 0955 05/27/18 0955 05/27/18 0955   98 9 °F (37 2 °C) 83 16 114/65 100 %      Temp Source Heart Rate Source Patient Position - Orthostatic VS BP Location FiO2 (%)   05/27/18 0955 05/27/18 0955 05/27/18 0955 05/27/18 0955 --   Oral Monitor Lying Right arm       Pain Score       05/27/18 1146       No Pain        Wt Readings from Last 1 Encounters:   05/27/18 58 4 kg (128 lb 12 oz)       Vital Signs (abnormal): wnl     Abnormal Labs/Diagnostic Test Results: Lactic acid  2 2, cl  109, BUN creat   20 1 57, gluc 143, total prot  5 8, alb  2 8, pt inr  15 3 1 22, H&H  11 2 35 7  EKG- a-fib     ED Treatment:   Medication Administration from 05/27/2018 0951 to 05/27/2018 1117     None          Past Medical/Surgical History:    Active Ambulatory Problems     Diagnosis Date Noted    Atrial fibrillation ([facility] ) 06/29/2017    Noncompliance with treatment 06/29/2017    Alzheimer's dementia with behavioral disturbance 06/29/2017    Falls 06/29/2017    Physical deconditioning 06/29/2017    [place] (hard of hearing) 06/29/2017     Resolved Ambulatory Problems     Diagnosis Date Noted    Arterial occlusion 06/29/2017    Accelerated hypertension 06/29/2017    TEDDY (acute kidney injury) ([facility] [facility] ) 06/29/2017    Critical lower limb ischemia 06/29/2017    Delirium 06/29/2017     Past Medical History:   Diagnosis Date    Alzheimer disease     Arterial occlusion     Atrial fibrillation ([facility] )     Cardiac disease     Dementia     Hypertension     Renal disorder        Admitting Diagnosis: Atrial fibrillation ([facility] ) [I48 91]  Blood in stool [K92 1]  Anemia [D64 9]  GI bleed [K92 2]  Physical deconditioning [R53 81]  Dementia [F03 90]  Anticoagulated [Z79 01]    Age/Sex: 80 y o  female    Assessment/Plan:   * Melena   Assessment & Plan     · Reported by patient's long-term care facility along with some bloody stools with mucus  · Reported diarrhea with fecal incontinence in ED will check cdiff  · Hemoglobin stable heme test positive stool noted  · Will trend H&H q 6 hours  · PPI IV b i d   · Consult GI          Atrial fibrillation (HCC)   Assessment & Plan     · Patient showing atrial fibrillation on EKG  · Rate is controlled  · Hemoglobin is stable however will consult Cardiology for cardiac clearance in case GI id wants to EGD/colonoscopy          Alzheimer's dementia with behavioral disturbance   Assessment & Plan     · Comfort measures  · Frequent reorientation  · Out of bed with assistance          Falls   Assessment & Plan     · Given Alzheimer's history recommend bed alarm  · Out of bed with assistance             VTE Prophylaxis: Apixaban (Eliquis)  / sequential compression device   Code Status:  Full code until this can be addressed with nursing or family  POLST: POLST form is not discussed and not completed at this time  Discussion with family:  None at bedside   nticipated Length of Stay:  Patient will be admitted on an Inpatient basis with an anticipated length of stay of  > 2 midnights     Justification for Hospital Stay:  Evidence of GI bleed      Admission Orders:  Scheduled Meds:   Current Facility-Administered Medications:  acetaminophen 650 mg Oral Q6H PRN Lucendia Bookbinder, MARCIANP   amLODIPine 10 mg Oral Daily Lucendia Bookbinder, CRNP   atenolol 50 mg Oral Daily Lucendia Bookbinder, CRJESSICA   cyanocobalamin 500 mcg Oral Daily Lucendia Bookbinder, CRNP   donepezil 5 mg Oral HS Lucendia Bookbinder, CRNP   haloperidol lactate 2 mg Intramuscular Q6H PRN Tree Conklin MD   memantine 5 mg Oral BID Lucendia Bookbinder, SEA   metroNIDAZOLE 500 mg Oral [facility] Tree Conklin MD   ondansetron 4 mg Oral Q8H PRN Lucendia Mirianbinder, SEA   pantoprazole 40 mg Oral BID AC Elmer Gonzalez III, MD   QUEtiapine 25 mg Oral HS Lucendia Bookbinder, SEA   saccharomyces boulardii 250 mg Oral BID Titus Hitchcock MD   sucralfate 1,000 mg Oral Q6H [facility] Titus Hitchcock MD   valsartan 320 mg Oral Daily Lucendia Bookbinder, SEA Continuous Infusions:    PRN Meds:   acetaminophen    haloperidol lactate    ondansetron     5/29 EGD   NPO   cvont OBS   Restraints non violent   SCD  Up and OOB as robe   Cardiology and GI consult   tele   Up w/ assist   5/29 bmp, cbc     Cardiology consult  5/28  Atrial fibrillation  -rate controlled on atenolol  -was on Eliquis for anticoagulation as outpatient  She presented with melena, however hemoglobin has been stable  GI consult pending  Would await their evaluation  May hold off on anticoagulation at this time  If Eliquis is to be resumed, would start at 2 5 mg twice daily   Hypertension  -blood pressure stable, continue present regimen   Code Status: Level 1 - Full Code    IM note  5/28  Principal Problem:    C  difficile diarrhea  Active Problems:    Atrial fibrillation [address] [address] )    Alzheimer's dementia with behavioral disturbance    Falls    Melena   Present on Admission:   Alzheimer's dementia with behavioral disturbance   [facility]   Falls   Melena   C  difficile diarrhea   · C diff diarrhea  Started patient on Flagyl  Doubt that she is having any significant GI bleeding issues  Patient to go for EGD as per GI service as I discussed with Dr Hutchins Marine  Continue with other treatment  · Dementia with agitation  Patient lives in a locked up unit  She would go back there  Would continue current treatment and also added Haldol p r n  and would discontinue Ativan  · History of atrial fibrillation  Heart rate and rhythm are stable  Patient high risk for falls  Probably not a safe candidate for chronic anticoagulation  Eliquis on hold in the setting of melena  · Essential hypertension  Continue with home medications including losartan  · Chronic kidney disease stage 3  Creatinine around 1 38  This was in June of last year  Then it went down to 1 13 and 1 18  On admission it was 1 57  After some hydration as she allowed some IV fluids, it is down to 1 41    I do not believe that she needs any further workup for that  · Melena-her hemoglobin has remained stable and actually had gone up initially  On PPI  Eliquis on hold  For EGD tomorrow  · Lactic acidosis-likely secondary to dehydration  Do not see any evidence of infection   VTE Pharmacologic Prophylaxis:   Pharmacologic: Pharmacologic VTE Prophylaxis contraindicated due to Melena  Mechanical VTE Prophylaxis in Place: Yes when she allows   Patient Centered Rounds: I have performed bedside rounds with nursing staff today    Discussions with Specialists or Other Care Team Provider: Yes   Education and Discussions with Family / Patient: Yes   Time Spent for Care: 35+ minutes  More than 50% of total time spent on counseling and coordination of care as described above    Current Length of Stay: 1 day(s)   Current Patient Status: Inpatient   Certification Statement: The patient will continue to require additional inpatient hospital stay due to [address]:  Back to her facility when stable    GI consult  5/28   ASSESSMENT & PLAN:  Principal Problem:    C  difficile diarrhea  Active Problems:    Atrial fibrillation ([address] )    Alzheimer's dementia with behavioral disturbance    Falls    Melena    Anemia    GI bleed   She is a 19-year-old female with recurrent Clostridium difficile infection in addition to melena consistent with an upper GI bleed  The patient does have severe dimension is been ripping out all of her intravenous lines and medications  I did spend some time talking to her son on the telephone regarding her treatment and prognosis    1 I am going to check another hemoglobin now to make sure she is stable to wait for endoscopy tomorrow   2 I am going to put her on for an endoscopy tomorrow; her son tells me that he will be available by phone for the gastroenterologist and the anesthesiologist to call for consent in the afternoon    He told me to use the mobile cell phone in the computer system   3 I am changing her Protonix drip to Protonix 40 mg p o  b i d  in addition to Carafate 1 g slurry q i d    4 I am recommending Flagyl 500 mg p o  t i d  for a 10 day course; I am adding Florastor; contact precautions   5 CBC in a m  NPO after midnight

## 2018-05-29 NOTE — PLAN OF CARE
Problem: DISCHARGE PLANNING  Goal: Discharge to home or other facility with appropriate resources  INTERVENTIONS:  - Identify barriers to discharge w/patient and caregiver  - Arrange for needed discharge resources and transportation as appropriate  - Identify discharge learning needs (meds, wound care, etc )  - Arrange for interpretive services to assist at discharge as needed  - Refer to Case Management Department for coordinating discharge planning if the patient needs post-hospital services based on physician/advanced practitioner order or complex needs related to functional status, cognitive ability, or social support system   Outcome: Progressing  Will return to [facility], waiting for them to agree to her return

## 2018-05-29 NOTE — SOCIAL WORK
Cm contacted patient son Teri Nephmega confirmed that patient is a resident at [address] [facility] Varghe[address]rina Yarbrough stated when patient is medically stable goal is for her to return to [facility]

## 2018-05-29 NOTE — ANESTHESIA PREPROCEDURE EVALUATION
Review of Systems/Medical History    Chart reviewed  No history of anesthetic complications     Cardiovascular  Hypertension on > 1 medication, Dysrhythmias , atrial fibrillation,    Pulmonary       GI/Hepatic            Endo/Other     GYN       Hematology  Anemia ,     Musculoskeletal       Neurology   Psychology     Dementia                Anesthesia Plan  ASA Score- 3     Anesthesia Type- IV sedation with anesthesia with ASA Monitors  Additional Monitors:   Airway Plan:     Comment: Phone consent obtained from brittany        Plan Factors-    Induction- intravenous  Postoperative Plan-     Informed Consent- Anesthetic plan and risks discussed with healthcare power of   I personally reviewed this patient with the CRNA  Discussed and agreed on the Anesthesia Plan with the CRNA  Lisa Locke

## 2018-05-29 NOTE — ASSESSMENT & PLAN NOTE
Patient lives in locked up unit in the nursing home  She is very confused, walking/restless and wants to leave  She is not following commands  Continue one-to-one  Re-Orientation  Haldol p r n

## 2018-05-29 NOTE — PROGRESS NOTES
Pt panicking and stating she needs to leave and get home  Pt told she is in hospital awaiting test  Dr Bhupendra Ludwig and surgical services aware

## 2018-05-29 NOTE — PROGRESS NOTES
Progress Note Andres Hammett 3/28/1926, 80 y o  female MRN: [id number]    Unit/Bed#: -01 Encounter: [id number]    Primary Care Provider: SEA Lerma   Date and time admitted to hospital: 5/27/2018  9:51 AM        * C  difficile diarrhea   Assessment & Plan    Continue Flagyl  Clinically better        Alzheimer's dementia with behavioral disturbance   Assessment & Plan    Patient lives in locked up unit in the nursing home  She is very confused, walking/restless and wants to leave  She is not following commands  Continue one-to-one  Re-Orientation  Haldol p r n  Melena   Assessment & Plan    · Present on admission- Reported by patient's long-term care facility along with some bloody stools with mucus    GI following  Patient scheduled for EGD today  Hemoglobin remained stable  Continue to monitor clinically        Chronic atrial fibrillation ([address] )   Assessment & Plan    · Currently rate controlled  Continue atenolol  · Eliquis on hold secondary to GI bleed  · Cardiology following        Hypertension   Assessment & Plan    Blood pressure is stable  Continue home medication        CKD (chronic kidney disease) stage 3, GFR 30-59 ml/min   Assessment & Plan    Creatinine improved to baseline  Continue to monitor            VTE Pharmacologic Prophylaxis:   Pharmacologic: Heparin  Mechanical VTE Prophylaxis in Place: Yes    Patient Centered Rounds: I have performed bedside rounds with nursing staff today  Discussions with Specialists or Other Care Team Provider: GI    Education and Discussions with Family / Patient:  Discussed with patient's son in detail about the management    Time Spent for Care: 20 minutes  More than 50% of total time spent on counseling and coordination of care as described above      Current Length of Stay: 2 day(s)    Current Patient Status: Inpatient   Certification Statement: The patient will continue to require additional inpatient hospital stay due to Evaluation of melena/GI bleed    Discharge Plan:  Plan to discharge next 24 hours    Code Status: Level 1 - Full Code      Subjective:   Patient seen and examined  She is very confused, was walking, restless and told me that she wanted to leave  Not really following commands She is on continue observation  Objective:     Vitals:   Temp (24hrs), Av 9 °F (36 6 °C), Min:97 5 °F (36 4 °C), Max:98 5 °F (36 9 °C)    HR:  [83-99] 83  Resp:  [18] 18  BP: (138-144)/(60-70) 140/60  SpO2:  [97 %-98 %] 98 %  Body mass index is 24 33 kg/m²  Input and Output Summary (last 24 hours): Intake/Output Summary (Last 24 hours) at 18 1414  Last data filed at 18 0430   Gross per 24 hour   Intake              300 ml   Output              256 ml   Net               44 ml       Physical Exam:     Physical Exam   Constitutional: She appears well-nourished  No distress  Elderly frail woman, demented very confused, not following commands  Not appearing in acute distress   HENT:   Head: Normocephalic and atraumatic  Mouth/Throat: Oropharynx is clear and moist    Eyes: Pupils are equal, round, and reactive to light  Neck: Normal range of motion  Neck supple  Cardiovascular: Normal rate  No murmur heard  Irregularly regular   Pulmonary/Chest: Effort normal and breath sounds normal    Abdominal: Soft  Bowel sounds are normal    Musculoskeletal: Normal range of motion  Neurological:   Alert but not oriented  Not following commands   Skin: Skin is warm and dry           Additional Data:     Labs:      Results from last 7 days  Lab Units 18  0617   WBC Thousand/uL 6 13   HEMOGLOBIN g/dL 11 6   HEMATOCRIT % 35 6   PLATELETS Thousands/uL 214   NEUTROS PCT % 76*   LYMPHS PCT % 11*   MONOS PCT % 10   EOS PCT % 2       Results from last 7 days  Lab Units 18  0617  18  1007   SODIUM mmol/L 138  < > 142   POTASSIUM mmol/L 3 6  < > 3 8   CHLORIDE mmol/L 106  < > 109*   CO2 mmol/L 21  < > 22   BUN mg/dL 20  < > 20   CREATININE mg/dL 1 25  < > 1 57*   CALCIUM mg/dL 7 9*  < > 8 3   TOTAL PROTEIN g/dL  --   --  5 8*   BILIRUBIN TOTAL mg/dL  --   --  0 50   ALK PHOS U/L  --   --  96   ALT U/L  --   --  12   AST U/L  --   --  14   GLUCOSE RANDOM mg/dL 103  < > 143*   < > = values in this interval not displayed  Results from last 7 days  Lab Units 05/27/18  1007   INR  1 22*                 * I Have Reviewed All Lab Data Listed Above  * Additional Pertinent Lab Tests Reviewed: [address] Admission Reviewed    Imaging:    Imaging Reports Reviewed Today Include:   Imaging Personally Reviewed by Myself Includes:      Recent Cultures (last 7 days):       Results from last 7 days  Lab Units 05/27/18  1249   C DIFF TOXIN B  POSITIVE for C difficle toxin by PCR  *       Last 24 Hours Medication List:     Current Facility-Administered Medications:  acetaminophen 650 mg Oral Q6H PRN SEA Osei   amLODIPine 10 mg Oral Daily SEA Osei   atenolol 50 mg Oral Daily SEA Osei   cyanocobalamin 500 mcg Oral Daily SEA Osei   donepezil 5 mg Oral HS SEA Osei   haloperidol lactate 2 mg Intramuscular Q6H PRN Isaura Cardenas MD   memantine 5 mg Oral BID SEA Osei   metroNIDAZOLE 500 mg Oral [facility] Isaura Cardenas MD   ondansetron 4 mg Oral Q8H PRN SEA Osei   pantoprazole 40 mg Oral BID AC Alicia Harrison III, MD   QUEtiapine 25 mg Oral HS SEA Osei   saccharomyces boulardii 250 mg Oral BID Gayatri Smith MD   sucralfate 1,000 mg Oral Q6H [address] Gayatri Smith MD   valsartan 320 mg Oral Daily SEA Osei        Today, Patient Was Seen By: Grayson Lamb MD    ** Please Note: Dictation voice to text software may have been used in the creation of this document   **

## 2018-05-29 NOTE — OP NOTE
**** GI/ENDOSCOPY REPORT ****     PATIENT NAME: Sophia Gama - VISIT ID:  Patient ID: [id number]   YOB: 1926     INTRODUCTION: Esophagogastroduodenoscopy - A 80 female patient presents   for an inpatient Esophagogastroduodenoscopy at [address] [place] [address]  INDICATIONS: Melena  CONSENT: The benefits, risks, and alternatives to the procedure were   discussed and informed consent was obtained from the patient  PREPARATION:  EKG, pulse, pulse oximetry and blood pressure were monitored   throughout the procedure  ASA Classification: Class 3 - Patient has severe   systemic disturbance that may or may not be related to the disorder   requiring surgery  MEDICATIONS: MAC anesthesia  PROCEDURE:  The endoscope was passed without difficulty through the mouth   under direct visualization and advanced to the 2nd portion of the   duodenum  The scope was withdrawn and the mucosa was carefully examined  FINDINGS:   Esophagus: The esophagus appeared to be normal   Stomach: There was a single diminutive area of superficial erosion in the body of   the stomach  It was not bleeding and showed no bleeding stigmata  A cold   forceps biopsy was taken from the antrum and body of the stomach  Duodenum: The duodenal bulb and 2nd portion of the duodenum appeared to be   normal      COMPLICATIONS: There were no complications  IMPRESSIONS: Normal esophagus  Erosion found in the body of the stomach  Biopsy taken  Normal duodenal bulb and 2nd portion of the duodenum  RECOMMENDATIONS: Resume regular diet as tolerated  Continue current   medications  Follow-up on the results of the biopsy specimens  Watch for   further bleeding  Continue treatment of C diff - melena possibly due to   right sided colitis in the setting of C diff  Consider outpatient   colonoscopy following resolution of C diff       ESTIMATED BLOOD LOSS:     PATHOLOGY SPECIMENS: Cold forceps biopsy taken from antrum and body of the   stomach  Associated finding: Erosion  PROCEDURE CODES:     ICD-9 Codes: 578 1 Blood in stool 535 4 Other specified gastritides     ICD-10 Codes: K92 1 Melena R19 8 Other specified symptoms and signs   involving the digestive system and abdomen     PERFORMED BY: OLGA Brown  on 05/29/2018  Version 1, electronically signed by OLGA Brown  on 05/29/2018   at 16:00

## 2018-05-29 NOTE — ANESTHESIA POSTPROCEDURE EVALUATION
Post-Op Assessment Note      CV Status:  Stable    Mental Status:  Alert and awake    Hydration Status:  Euvolemic    PONV Controlled:  Controlled    Airway Patency:  Patent    Post Op Vitals Reviewed: No          Staff: CRNA, Anesthesiologist           BP      Temp     Pulse     Resp      SpO2

## 2018-05-29 NOTE — DISCHARGE INSTRUCTIONS
A-fib (Atrial Fibrillation)   WHAT YOU NEED TO KNOW:   A-fib may come and go, or it may be a long-term condition  A-fib can cause blood clots, stroke, or heart failure  These conditions may become life-threatening  It is important to treat and manage a-fib to help prevent a blood clot, stroke, or heart failure  DISCHARGE INSTRUCTIONS:   Call 911 for any of the following:   · You have any of the following signs of a heart attack:      ¨ Squeezing, pressure, or pain in your chest that lasts longer than 5 minutes or returns    ¨ Discomfort or pain in your back, neck, jaw, stomach, or arm     ¨ Trouble breathing    ¨ Nausea or vomiting    ¨ Lightheadedness or a sudden cold sweat, especially with chest pain or trouble breathing    · You have any of the following signs of a stroke:      ¨ Numbness or drooping on one side of your face     ¨ Weakness in an arm or leg    ¨ Confusion or difficulty speaking    ¨ Dizziness, a severe headache, or vision loss  Return to the emergency department if:  You have any of the following signs of a blood clot:  · You feel lightheaded, are short of breath, and have chest pain  · You cough up blood  · You have swelling, redness, pain, or warmth in your arm or leg  Contact your cardiologist or healthcare provider if:   · Your target heart rate is not in the range it should be  · You have new or worsening swelling in your legs, feet, ankles, or abdomen  · You are short of breath, even at rest      · You have questions or concerns about your condition or care  Medicines: You may need any of the following:  · Heart medicines  help control your heart rate and rhythm  You may need more than one medicine to treat your symptoms  · Blood thinners    help prevent blood clots  Examples of blood thinners include heparin and warfarin  Clots can cause strokes, heart attacks, and death   The following are general safety guidelines to follow while you are taking a blood thinner:    ¨ Watch for bleeding and bruising while you take blood thinners  Watch for bleeding from your gums or nose  Watch for blood in your urine and bowel movements  Use a soft washcloth on your skin, and a soft toothbrush to brush your teeth  This can keep your skin and gums from bleeding  If you shave, use an electric shaver  Do not play contact sports  ¨ Tell your dentist and other healthcare providers that you take anticoagulants  Wear a bracelet or necklace that says you take this medicine  ¨ Do not start or stop any medicines unless your healthcare provider tells you to  Many medicines cannot be used with blood thinners  ¨ Tell your healthcare provider right away if you forget to take the medicine, or if you take too much  ¨ Warfarin  is a blood thinner that you may need to take  The following are things you should be aware of if you take warfarin  § Foods and medicines can affect the amount of warfarin in your blood  Do not make major changes to your diet while you take warfarin  Warfarin works best when you eat about the same amount of vitamin K every day  Vitamin K is found in green leafy vegetables and certain other foods  Ask for more information about what to eat when you are taking warfarin  § You will need to see your healthcare provider for follow-up visits when you are on warfarin  You will need regular blood tests  These tests are used to decide how much medicine you need  · Antiplatelets , such as aspirin, help prevent blood clots  Take your antiplatelet medicine exactly as directed  These medicines make it more likely for you to bleed or bruise  If you are told to take aspirin, do not take acetaminophen or ibuprofen instead  · Take your medicine as directed  Contact your healthcare provider if you think your medicine is not helping or if you have side effects  Tell him or her if you are allergic to any medicine   Keep a list of the medicines, vitamins, and herbs you take  Include the amounts, and when and why you take them  Bring the list or the pill bottles to follow-up visits  Carry your medicine list with you in case of an emergency  Follow up with your cardiologist as directed: You will need regular blood tests and monitoring  Write down your questions so you remember to ask them during your visits  Manage A-fib:   · Know your target heart rate  Learn how to take your pulse and monitor your heart rate  · Manage other health conditions  This includes high blood pressure, sleep apnea, thyroid disease, diabetes, and other heart conditions  Take medicine as directed and follow your treatment plan  · Limit or do not drink alcohol  Alcohol can make a-fib hard to manage  Ask your healthcare provider if it is safe for you to drink alcohol  A drink of alcohol is 12 ounces of beer, 5 ounces of wine, or 1½ ounces of liquor  · Do not smoke  Nicotine and other chemicals in cigarettes and cigars can cause heart and lung damage  Ask your healthcare provider for information if you currently smoke and need help to quit  E-cigarettes or smokeless tobacco still contain nicotine  Talk to your healthcare provider before you use these products  · Eat heart-healthy foods  Heart healthy foods will help keep your cholesterol low  These include fruits, vegetables, whole-grain breads, low-fat dairy products, beans, lean meats, and fish  Replace butter and margarine with heart-healthy oils such as olive oil and canola oil  · Maintain a healthy weight  Ask your healthcare provider how much you should weigh  Ask him to help you create a weight loss plan if you are overweight  · Exercise for 30 minutes  most days of the week  Ask your healthcare provider about the best exercise plan for you  © 2017 [address] Information is for End User's use only and may not be sold, redistributed or otherwise used for commercial purposes   All illustrations and images included in CareNotes® are the copyrighted property of A D A M [employer]  or Leonel Ortega  The above information is an  only  It is not intended as medical advice for individual conditions or treatments  Talk to your doctor, nurse or pharmacist before following any medical regimen to see if it is safe and effective for you  Upper Endoscopy   WHAT YOU NEED TO KNOW:   An upper endoscopy is also called an upper gastrointestinal (GI) endoscopy, or an esophagogastroduodenoscopy (EGD)  You may feel bloated, gassy, or have some abdominal discomfort after your procedure  Your throat may be sore for 24 to 36 hours  You may burp or pass gas from air that is still inside your body  DISCHARGE INSTRUCTIONS:   Call 911 for any of the following:   · You have sudden chest pain or trouble breathing  Seek care immediately if:   · You feel dizzy or faint  · You have trouble swallowing  · Your bowel movements are very dark or black  · Your abdomen is hard and firm and you have severe pain  · You vomit blood  Contact your healthcare provider if:   · You feel full or bloated and cannot burp or pass gas  · You have not had a bowel movement for 3 days after your procedure  · You have neck pain  · You have a fever or chills  · You have nausea or are vomiting  · You have a rash or hives  · You have questions or concerns about your endoscopy  Relieve a sore throat:  Suck on throat lozenges or crushed ice  Gargle with a small amount of warm salt water  Mix 1 teaspoon of salt and 1 cup of warm water to make salt water  Relieve gas and discomfort from bloating:  Lie on your right side with a heating pad on your abdomen  Take short walks to help pass gas  Eat small meals until bloating is relieved  Rest after your procedure: You have been given medicine to relax you  Do not  drive or make important decisions until the day after your procedure   Return to your normal activity as directed  You can usually return to work the day after your procedure  Follow up with your healthcare provider as directed:  Write down your questions so you remember to ask them during your visits  © 2017 [address] is for End User's use only and may not be sold, redistributed or otherwise used for commercial purposes  All illustrations and images included in CareNotes® are the copyrighted property of A D A M , Inc  or Leonel Ortega  The above information is an  only  It is not intended as medical advice for individual conditions or treatments  Talk to your doctor, nurse or pharmacist before following any medical regimen to see if it is safe and effective for you

## 2018-05-29 NOTE — SOCIAL WORK
Called three different times to [facility] explaining to them that patient was ready for DC 5/30/18 on their message machine and requesting them to call back to give us acceptance for her return  [facility] called also and left same message  Waiting for their call

## 2018-05-29 NOTE — ASSESSMENT & PLAN NOTE
· Present on admission- Reported by patient's long-term care facility along with some bloody stools with mucus    GI following  Patient scheduled for EGD today  Hemoglobin remained stable    Continue to monitor clinically

## 2018-05-30 VITALS
SYSTOLIC BLOOD PRESSURE: 130 MMHG | DIASTOLIC BLOOD PRESSURE: 60 MMHG | BODY MASS INDEX: 24.33 KG/M2 | WEIGHT: 128.75 LBS | TEMPERATURE: 98.3 F | HEART RATE: 84 BPM | RESPIRATION RATE: 19 BRPM | OXYGEN SATURATION: 93 %

## 2018-05-30 PROCEDURE — 99239 HOSP IP/OBS DSCHRG MGMT >30: CPT | Performed by: INTERNAL MEDICINE

## 2018-05-30 RX ORDER — SACCHAROMYCES BOULARDII 250 MG
250 CAPSULE ORAL 2 TIMES DAILY
Qty: 20 CAPSULE | Refills: 0 | Status: SHIPPED | OUTPATIENT
Start: 2018-05-30

## 2018-05-30 RX ORDER — METRONIDAZOLE 500 MG/1
500 TABLET ORAL EVERY 8 HOURS SCHEDULED
Qty: 21 TABLET | Refills: 0 | Status: SHIPPED | OUTPATIENT
Start: 2018-05-30 | End: 2018-06-06

## 2018-05-30 RX ORDER — SUCRALFATE ORAL 1 G/10ML
1000 SUSPENSION ORAL EVERY 6 HOURS SCHEDULED
Qty: 420 ML | Refills: 0 | Status: SHIPPED | OUTPATIENT
Start: 2018-05-30

## 2018-05-30 RX ORDER — PANTOPRAZOLE SODIUM 40 MG/1
40 TABLET, DELAYED RELEASE ORAL
Qty: 30 TABLET | Refills: 0 | Status: SHIPPED | OUTPATIENT
Start: 2018-05-30

## 2018-05-30 RX ADMIN — SUCRALFATE 1000 MG: 1 SUSPENSION ORAL at 13:05

## 2018-05-30 RX ADMIN — Medication 500 MCG: at 10:13

## 2018-05-30 RX ADMIN — Medication 250 MG: at 18:04

## 2018-05-30 RX ADMIN — MEMANTINE HYDROCHLORIDE 5 MG: 5 TABLET ORAL at 18:05

## 2018-05-30 RX ADMIN — SUCRALFATE 1000 MG: 1 SUSPENSION ORAL at 05:12

## 2018-05-30 RX ADMIN — SUCRALFATE 1000 MG: 1 SUSPENSION ORAL at 18:05

## 2018-05-30 RX ADMIN — MEMANTINE HYDROCHLORIDE 5 MG: 5 TABLET ORAL at 10:14

## 2018-05-30 RX ADMIN — VALSARTAN 320 MG: 160 TABLET, FILM COATED ORAL at 10:13

## 2018-05-30 RX ADMIN — PANTOPRAZOLE SODIUM 40 MG: 40 TABLET, DELAYED RELEASE ORAL at 05:13

## 2018-05-30 RX ADMIN — SUCRALFATE 1000 MG: 1 SUSPENSION ORAL at 00:03

## 2018-05-30 RX ADMIN — METRONIDAZOLE 500 MG: 500 TABLET ORAL at 10:14

## 2018-05-30 RX ADMIN — Medication 250 MG: at 10:13

## 2018-05-30 RX ADMIN — AMLODIPINE BESYLATE 10 MG: 10 TABLET ORAL at 10:14

## 2018-05-30 RX ADMIN — METRONIDAZOLE 500 MG: 500 TABLET ORAL at 18:05

## 2018-05-30 RX ADMIN — ATENOLOL 50 MG: 50 TABLET ORAL at 10:13

## 2018-05-30 RX ADMIN — PANTOPRAZOLE SODIUM 40 MG: 40 TABLET, DELAYED RELEASE ORAL at 16:58

## 2018-05-30 RX ADMIN — METRONIDAZOLE 500 MG: 500 TABLET ORAL at 02:30

## 2018-05-30 NOTE — SOCIAL WORK
rn stated that [facility] conducted their bedside assessment and are accepting patient back today  Family alerted of patient discharge this day by MD Meade left voicemail left for patient son, medicare letter mailed to [facility]

## 2018-05-30 NOTE — ASSESSMENT & PLAN NOTE
Likely secondary to right-sided colitis in the setting of C diff  EGD demonstrated normal esophagus and erosion in the body of stomach  Biopsies taken, pending  Hemoglobin remained stable    Continue to monitor clinically

## 2018-05-30 NOTE — SOCIAL WORK
Follow up call received from patient son Miguel Angel Yarbrough, patient alert however,not oriented  Miguel Angel Yarbrough stated he was aware of his mother's return to [facility] correction this day  Cm reviewed patient medicare letter, Miguel Angel Yarbrough reports understanding and no objections to patient dcp  Patient is scheduled with [facility] at 330[facility]  No reported needs, treatment team and family aware of dcp

## 2018-05-30 NOTE — SOCIAL WORK
Cm placed a follow up call to [facility] and spoke with [facility] to confirm if she would be coming to complete a bedside assessment  Clark Baca stated she would review her schedule and inform cm of arrival time to complete patient bedside assessment

## 2018-05-30 NOTE — DISCHARGE SUMMARY
Discharge- Adore Merino 3/28/1926, 80 y o  female MRN: [id number]    Unit/Bed#: -01 Encounter: [id number]    Primary Care Provider: SEA Kwong   Date and time admitted to hospital: 5/27/2018  9:51 AM        * C  difficile diarrhea   Assessment & Plan    Continue Flagyl for total of 10 days  Clinically better        Alzheimer's dementia with behavioral disturbance   Assessment & Plan    Patient lives in locked up unit in the nursing home  She is very confused, walking/restless and wants to leave  She is not following commands  Continue one-to-one  Re-Orientation  Haldol p r n  Melena   Assessment & Plan    Likely secondary to right-sided colitis in the setting of C diff  EGD demonstrated normal esophagus and erosion in the body of stomach  Biopsies taken, pending  Hemoglobin remained stable  Continue to monitor clinically        Chronic atrial fibrillation ([address] )   Assessment & Plan    · Currently rate controlled  Continue atenolol  · Eliquis on hold secondary to GI bleed  · Cardiology following        Hypertension   Assessment & Plan    Blood pressure is stable  Continue home medication        CKD (chronic kidney disease) stage 3, GFR 30-59 ml/min   Assessment & Plan    Creatinine improved to baseline  Continue to monitor        Anemia   Assessment & Plan    Likely in the setting of melena from right-sided colitis/C diff  Hemoglobin at baseline          Falls   Assessment & Plan    · Given Alzheimer's history recommend bed alarm  · Out of bed with assistance          Discharging Physician / Practitioner: David Clark MD  PCP: Daniel Kwong  Admission Date:   Admission Orders     Ordered        05/27/18 1030  Inpatient Admission (expected length of stay for this patient is greater than two midnights)  Once             Discharge Date: 05/30/18    Resolved Problems  Date Reviewed: 5/30/2018    None          Consultations During [facility] Stay:  · Cardiology  · GI    Procedures Performed:   EGD- Normal esophagus  Erosion found in the body of the stomach  Biopsy taken  Normal duodenal bulb and 2nd portion of the duodenum    Significant Findings / Test Results:   C diff by PCR-positive      Incidental Findings:   · None    Test Results Pending at Discharge (will require follow up): · Biopsy results of EGD     Outpatient Tests Requested:  · None    Complications:  None    Reason for Admission:  Melena    Hospital Course:     Sandi Kevin is a 80 y o  female patient with past medical history of atrial fibrillation, dementia who originally presented to the hospital from long-term skilled facility on 5/27/2018 due melena  Patient has severe dementia, very confused and not oriented at baseline  Patient was sent in from the nursing home for black tarry stools and diarrhea  C diff is positive, started on Flagyl  She was evaluated by Gastroenterology, later underwent EGD that demonstrated erosion in the body of stomach, could have likely contributed for melena  She is to complete the course of flagyl for C diff  Patient was also evaluated by Cardiology history of AFib and patient was on Eliquis prior to presentation  Eliquis was discontinued and recommended to ordered for 2 more weeks after discharge  Patient is to follow up with Cardiology before resuming again  Please see above list of diagnoses and related plan for additional information  Condition at Discharge: stable     Discharge Day Visit / Exam:     Subjective:  Patient seen and examined  She is very confused at baseline, not oriented    But appears to be not in acute distress   Vitals: Blood Pressure: 130/60 (05/30/18 1059)  Pulse: 84 (05/30/18 1059)  Temperature: 98 3 °F (36 8 °C) (05/30/18 1059)  Temp Source: Oral (05/30/18 1059)  Respirations: 19 (05/30/18 1059)  Weight - Scale: 58 4 kg (128 lb 12 oz) (05/27/18 0955)  SpO2: 93 % (05/30/18 1059)  Exam:   Physical Exam Constitutional: She appears well-developed and well-nourished  No distress  HENT:   Head: Normocephalic and atraumatic  Eyes: Pupils are equal, round, and reactive to light  Neck: Normal range of motion  Cardiovascular: Normal rate  Pulmonary/Chest: Effort normal and breath sounds normal    Abdominal: Soft  Bowel sounds are normal    Musculoskeletal: Normal range of motion  Neurological: She is alert  Pleasantly confused   Skin: Skin is warm  Discussion with Family:  Explained the management plan in detail to patient's son over the phone  Discharge instructions/Information to patient and family:   See after visit summary for information provided to patient and family  Provisions for Follow-Up Care:  See after visit summary for information related to follow-up care and any pertinent home health orders  Disposition:     Home    For Discharges to [facility]:  Patient was discharged to [address] [place] [address]      Planned Readmission:  None     Discharge Statement:  I spent 35 minutes discharging the patient  This time was spent on the day of discharge  I had direct contact with the patient on the day of discharge  Greater than 50% of the total time was spent examining patient, answering all patient questions, arranging and discussing plan of care with patient as well as directly providing post-discharge instructions  Additional time then spent on discharge activities  Discharge Medications:  See after visit summary for reconciled discharge medications provided to patient and family        ** Please Note: This note has been constructed using a voice recognition system **

## 2018-06-01 ENCOUNTER — TRANSITIONAL CARE MANAGEMENT (OUTPATIENT)
Dept: FAMILY MEDICINE CLINIC | Facility: CLINIC | Age: 83
End: 2018-06-01

## 2018-06-05 ENCOUNTER — OFFICE VISIT (OUTPATIENT)
Dept: CARDIOLOGY CLINIC | Facility: CLINIC | Age: 83
End: 2018-06-05
Payer: MEDICARE

## 2018-06-05 ENCOUNTER — OFFICE VISIT (OUTPATIENT)
Dept: GASTROENTEROLOGY | Facility: CLINIC | Age: 83
End: 2018-06-05
Payer: MEDICARE

## 2018-06-05 VITALS
HEART RATE: 78 BPM | BODY MASS INDEX: 22.84 KG/M2 | WEIGHT: 121 LBS | DIASTOLIC BLOOD PRESSURE: 70 MMHG | HEIGHT: 61 IN | SYSTOLIC BLOOD PRESSURE: 136 MMHG

## 2018-06-05 VITALS
DIASTOLIC BLOOD PRESSURE: 70 MMHG | HEART RATE: 78 BPM | OXYGEN SATURATION: 97 % | HEIGHT: 61 IN | BODY MASS INDEX: 22.96 KG/M2 | WEIGHT: 121.6 LBS | SYSTOLIC BLOOD PRESSURE: 136 MMHG

## 2018-06-05 DIAGNOSIS — A04.72 C. DIFFICILE DIARRHEA: Primary | ICD-10-CM

## 2018-06-05 DIAGNOSIS — I10 ESSENTIAL HYPERTENSION: ICD-10-CM

## 2018-06-05 DIAGNOSIS — K21.00 GERD WITH ESOPHAGITIS: ICD-10-CM

## 2018-06-05 DIAGNOSIS — K27.9 PUD (PEPTIC ULCER DISEASE): ICD-10-CM

## 2018-06-05 DIAGNOSIS — I48.20 CHRONIC ATRIAL FIBRILLATION (HCC): Primary | ICD-10-CM

## 2018-06-05 PROCEDURE — 99214 OFFICE O/P EST MOD 30 MIN: CPT | Performed by: INTERNAL MEDICINE

## 2018-06-05 PROCEDURE — 99213 OFFICE O/P EST LOW 20 MIN: CPT | Performed by: INTERNAL MEDICINE

## 2018-06-05 RX ORDER — SACCHAROMYCES BOULARDII 250 MG
250 CAPSULE ORAL DAILY
Qty: 30 CAPSULE | Refills: 3 | Status: SHIPPED | OUTPATIENT
Start: 2018-06-05 | End: 2018-06-08 | Stop reason: SDUPTHER

## 2018-06-05 RX ORDER — PANTOPRAZOLE SODIUM 40 MG/1
40 TABLET, DELAYED RELEASE ORAL DAILY
Qty: 30 TABLET | Refills: 0 | Status: SHIPPED | OUTPATIENT
Start: 2018-06-16 | End: 2018-06-08 | Stop reason: SDUPTHER

## 2018-06-05 NOTE — PATIENT INSTRUCTIONS
Decrease pantoprazole to daily when twice daily expires  Decrease Florastor to daily when twice daily expires  Blood work in one month prior to next visit  Stool log - bring to next visit

## 2018-06-05 NOTE — PROGRESS NOTES
Assessment/Plan:    Decrease pantoprazole to daily when twice daily expires  Decrease Florastor to daily when twice daily expires  Blood work in one month prior to next visit  Stool log - bring to next visit       Problem List Items Addressed This Visit     C  difficile diarrhea - Primary    Relevant Medications    pantoprazole (PROTONIX) 40 mg tablet (Start on 6/16/2018)    saccharomyces boulardii (FLORASTOR) 250 mg capsule    Other Relevant Orders    CBC    GERD with esophagitis    Relevant Medications    pantoprazole (PROTONIX) 40 mg tablet (Start on 6/16/2018)    PUD (peptic ulcer disease)    Relevant Medications    pantoprazole (PROTONIX) 40 mg tablet (Start on 6/16/2018)            Subjective:      Patient ID: Delisa Person is a 80 y o  female  80year old female with recent d/c from hospital for CDIFF and melena  PMH includes dementia and recent falls evaluated in ER  She had an endoscopy in the hospital that showed a superficial erosion in the body of the stomach  She had a biopsy that showed metaplasia but no dysplasia  She has no pain presently and "feels good'"  Care giver from [place] states patients stools have been formed and brown without blood or black  They have not been keeping a stool journal   Recommendation from hospital was follow up colonoscopy and we will discuss at next visit in one month  I will discuss with family member at that time also  I will call family member prior to visit  We will also obtain a repeat CBC at that time  CBC prior to discharge was improved  She is on pantoprazole BID and Florastor BID and will stop both in one week- I ordered both for daily after the BID stops  According to care worker she is eating well, is active and has not complained of any discomfort  The following portions of the patient's history were reviewed and updated as appropriate:   She  has a past medical history of Alzheimer disease;  Arterial occlusion; Atrial fibrillation ([facility] ); Benign hypertension; Cardiac disease; Dementia; Hypertension; and Renal disorder  She   Patient Active Problem List    Diagnosis Date Noted    GERD with esophagitis 06/05/2018    PUD (peptic ulcer disease) 06/05/2018    Hypertension 05/29/2018    CKD (chronic kidney disease) stage 3, GFR 30-59 ml/min 05/29/2018    C  difficile diarrhea 05/28/2018    Melena 05/27/2018    Anemia 05/27/2018    GI bleed 05/27/2018    Chronic atrial fibrillation ([facility] ) 06/29/2017    Noncompliance with treatment 06/29/2017    Alzheimer's dementia with behavioral disturbance 06/29/2017    Falls 06/29/2017    Physical deconditioning 06/29/2017    [place] of hearing[place] 06/29/2017     She  has a past surgical history that includes Esophagogastroduodenoscopy (N/A, 5/29/2018)  Her family history includes Hypertension in her mother  She  reports that she has never smoked  She has never used smokeless tobacco  She reports that she does not drink alcohol or use drugs    Current Outpatient Prescriptions   Medication Sig Dispense Refill    acetaminophen (TYLENOL) 325 mg tablet Take 650 mg by mouth every 6 (six) hours as needed for mild pain      amLODIPine (NORVASC) 10 mg tablet Take by mouth      atenolol (TENORMIN) 50 mg tablet Take by mouth      cyanocobalamin (VITAMIN B-12) 500 mcg tablet Take 500 mcg by mouth daily      donepezil (ARICEPT) 5 mg tablet Take 5 mg by mouth daily at bedtime      loperamide (IMODIUM A-D) 2 MG tablet Take 2 mg by mouth 4 (four) times a day as needed for diarrhea      LORazepam (ATIVAN) 0 5 mg tablet Take 0 5 mg by mouth every 8 (eight) hours as needed for anxiety      memantine (NAMENDA) 5 mg tablet Take 5 mg by mouth 2 (two) times a day      metroNIDAZOLE (FLAGYL) 500 mg tablet Take 1 tablet (500 mg total) by mouth every 8 (eight) hours for 7 days 21 tablet 0    mupirocin (BACTROBAN) 2 % ointment Apply topically daily      ondansetron (ZOFRAN) 4 mg tablet Take 4 mg by mouth every 6 (six) hours as needed for nausea or vomiting      pantoprazole (PROTONIX) 40 mg tablet Take 1 tablet (40 mg total) by mouth 2 (two) times a day before meals 30 tablet 0    QUEtiapine (SEROquel) 25 mg tablet Take by mouth      saccharomyces boulardii (FLORASTOR) 250 mg capsule Take 1 capsule (250 mg total) by mouth 2 (two) times a day 20 capsule 0    sucralfate (CARAFATE) 1 g/10 mL suspension Take 10 mL (1,000 mg total) by mouth every 6 (six) hours 420 mL 0    valsartan (DIOVAN) 320 MG tablet Take by mouth      [START ON 6/16/2018] pantoprazole (PROTONIX) 40 mg tablet Take 1 tablet (40 mg total) by mouth daily for 30 days 30 tablet 0    saccharomyces boulardii (FLORASTOR) 250 mg capsule Take 1 capsule (250 mg total) by mouth daily for 30 days 30 capsule 3     No current facility-administered medications for this visit        Current Outpatient Prescriptions on File Prior to Visit   Medication Sig    acetaminophen (TYLENOL) 325 mg tablet Take 650 mg by mouth every 6 (six) hours as needed for mild pain    amLODIPine (NORVASC) 10 mg tablet Take by mouth    atenolol (TENORMIN) 50 mg tablet Take by mouth    cyanocobalamin (VITAMIN B-12) 500 mcg tablet Take 500 mcg by mouth daily    donepezil (ARICEPT) 5 mg tablet Take 5 mg by mouth daily at bedtime    loperamide (IMODIUM A-D) 2 MG tablet Take 2 mg by mouth 4 (four) times a day as needed for diarrhea    LORazepam (ATIVAN) 0 5 mg tablet Take 0 5 mg by mouth every 8 (eight) hours as needed for anxiety    memantine (NAMENDA) 5 mg tablet Take 5 mg by mouth 2 (two) times a day    metroNIDAZOLE (FLAGYL) 500 mg tablet Take 1 tablet (500 mg total) by mouth every 8 (eight) hours for 7 days    mupirocin (BACTROBAN) 2 % ointment Apply topically daily    ondansetron (ZOFRAN) 4 mg tablet Take 4 mg by mouth every 6 (six) hours as needed for nausea or vomiting    pantoprazole (PROTONIX) 40 mg tablet Take 1 tablet (40 mg total) by mouth 2 (two) times a day before meals  QUEtiapine (SEROquel) 25 mg tablet Take by mouth    saccharomyces boulardii (FLORASTOR) 250 mg capsule Take 1 capsule (250 mg total) by mouth 2 (two) times a day    sucralfate (CARAFATE) 1 g/10 mL suspension Take 10 mL (1,000 mg total) by mouth every 6 (six) hours    valsartan (DIOVAN) 320 MG tablet Take by mouth     No current facility-administered medications on file prior to visit  She has No Known Allergies       Review of Systems   Unable to perform ROS: Dementia         Objective:      /70   Pulse 78   Ht 5' 1" (1 549 m)   Wt 54 9 kg (121 lb)   BMI 22 86 kg/m²          Physical Exam   Eyes: No scleral icterus  Cardiovascular: Normal rate and regular rhythm  Pulmonary/Chest: Effort normal and breath sounds normal    Abdominal: Soft  Bowel sounds are normal    Lymphadenopathy:     She has no cervical adenopathy  Skin: Skin is warm and dry  Psychiatric: She has a normal mood and affect

## 2018-06-05 NOTE — PROGRESS NOTES
Cardiology Follow Up    Benjamin Banuelos  3/28/1926  [id number]  [address] [place] [id number]    1  Chronic atrial fibrillation ([facility] )     2  Essential hypertension       Chief Complaint   Patient presents with    Follow-up       Interval History:  Patient presents for follow-up after recent hospitalization to [facility]  She was admitted for melena which was noted at long-term care facility [facility]  Patient has dementia and is unable to provide significant reliable history  She reports that she is feeling remarkably well and denies chest pain, shortness of breath, dizziness, palpitations  She does have history of falls  Patient Active Problem List   Diagnosis    Chronic atrial fibrillation ([facility] )    Noncompliance with treatment    Alzheimer's dementia with behavioral disturbance    Falls    Physical deconditioning    [place]hard of hearing[place]    Melena    C  difficile diarrhea    Anemia    GI bleed    Hypertension    CKD (chronic kidney disease) stage 3, GFR 30-59 ml/min    GERD with esophagitis    PUD (peptic ulcer disease)     Past Medical History:   Diagnosis Date    Alzheimer disease     Arterial occlusion     left leg    Atrial fibrillation ([facility] )     Benign hypertension     Cardiac disease     Dementia     Hypertension     Renal disorder      Social History     Social History    Marital status:      Spouse name: N/A    Number of children: N/A    Years of education: N/A     Occupational History    Not on file       Social History Main Topics    Smoking status: Never Smoker    Smokeless tobacco: Never Used    Alcohol use No    Drug use: No    Sexual activity: Not on file     Other Topics Concern    Not on file     Social History Narrative    No narrative on file      Family History   Problem Relation Age of Onset    Hypertension Mother Past Surgical History:   Procedure Laterality Date    ESOPHAGOGASTRODUODENOSCOPY N/A 5/29/2018    Procedure: ESOPHAGOGASTRODUODENOSCOPY (EGD);  pt has cdiff;  Surgeon: Felisa Wilkinson MD;  Location: [facility];   Service: Gastroenterology       Current Outpatient Prescriptions:     acetaminophen (TYLENOL) 325 mg tablet, Take 650 mg by mouth every 6 (six) hours as needed for mild pain, Disp: , Rfl:     amLODIPine (NORVASC) 10 mg tablet, Take by mouth, Disp: , Rfl:     atenolol (TENORMIN) 50 mg tablet, Take by mouth, Disp: , Rfl:     cyanocobalamin (VITAMIN B-12) 500 mcg tablet, Take 500 mcg by mouth daily, Disp: , Rfl:     donepezil (ARICEPT) 5 mg tablet, Take 5 mg by mouth daily at bedtime, Disp: , Rfl:     loperamide (IMODIUM A-D) 2 MG tablet, Take 2 mg by mouth 4 (four) times a day as needed for diarrhea, Disp: , Rfl:     LORazepam (ATIVAN) 0 5 mg tablet, Take 0 5 mg by mouth every 8 (eight) hours as needed for anxiety, Disp: , Rfl:     memantine (NAMENDA) 5 mg tablet, Take 5 mg by mouth 2 (two) times a day, Disp: , Rfl:     metroNIDAZOLE (FLAGYL) 500 mg tablet, Take 1 tablet (500 mg total) by mouth every 8 (eight) hours for 7 days, Disp: 21 tablet, Rfl: 0    mupirocin (BACTROBAN) 2 % ointment, Apply topically daily, Disp: , Rfl:     ondansetron (ZOFRAN) 4 mg tablet, Take 4 mg by mouth every 6 (six) hours as needed for nausea or vomiting, Disp: , Rfl:     pantoprazole (PROTONIX) 40 mg tablet, Take 1 tablet (40 mg total) by mouth 2 (two) times a day before meals, Disp: 30 tablet, Rfl: 0    QUEtiapine (SEROquel) 25 mg tablet, Take by mouth, Disp: , Rfl:     saccharomyces boulardii (FLORASTOR) 250 mg capsule, Take 1 capsule (250 mg total) by mouth 2 (two) times a day, Disp: 20 capsule, Rfl: 0    sucralfate (CARAFATE) 1 g/10 mL suspension, Take 10 mL (1,000 mg total) by mouth every 6 (six) hours, Disp: 420 mL, Rfl: 0    valsartan (DIOVAN) 320 MG tablet, Take by mouth, Disp: , Rfl:     [START ON 6/16/2018] pantoprazole (PROTONIX) 40 mg tablet, Take 1 tablet (40 mg total) by mouth daily for 30 days, Disp: 30 tablet, Rfl: 0    saccharomyces boulardii (FLORASTOR) 250 mg capsule, Take 1 capsule (250 mg total) by mouth daily for 30 days, Disp: 30 capsule, Rfl: 3  No Known Allergies        Review of Systems:  Review of Systems   Constitutional: Negative for activity change, diaphoresis, fatigue, fever and unexpected weight change  HENT: Negative for nosebleeds and trouble swallowing  Eyes: Negative for visual disturbance  Respiratory: Negative for cough, chest tightness, shortness of breath and wheezing  Cardiovascular: Negative for chest pain, palpitations and leg swelling  Gastrointestinal: Negative for abdominal pain, anal bleeding, blood in stool and nausea  Endocrine: Negative for cold intolerance and heat intolerance  Genitourinary: Negative for decreased urine volume, frequency and hematuria  Musculoskeletal: Negative for myalgias  Skin: Negative for color change and wound  Neurological: Negative for dizziness, syncope, weakness, light-headedness and headaches  Psychiatric/Behavioral: Negative for sleep disturbance  The patient is not nervous/anxious  Though ROS may be unreliable in the setting of dementia    Physical Exam:  /70 (BP Location: Left arm)   Pulse 78   Ht 5' 1" (1 549 m)   Wt 55 2 kg (121 lb 9 6 oz)   SpO2 97%   BMI 22 98 kg/m²     Physical Exam   Constitutional: She is oriented to person, place, and time  She appears well-developed and well-nourished  No distress  HENT:   Head: Normocephalic and atraumatic  Eyes: Conjunctivae are normal  No scleral icterus  Neck: Neck supple  No JVD present  Cardiovascular: Normal rate  An irregular rhythm present  No murmur heard  Pulmonary/Chest: Effort normal and breath sounds normal  No respiratory distress  She has no wheezes  She has no rales  Abdominal: She exhibits no distension   There is no tenderness  Musculoskeletal: She exhibits no edema  Neurological: She is alert and oriented to person, place, and time  Skin: Skin is warm and dry  No rash noted  She is not diaphoretic  Psychiatric: She has a normal mood and affect  Discussion/Summary:    Atrial fibrillation  -rate controlled on atenolol  -was previously on Eliquis however was discontinued after recent hospitalization for melena  EGD was performed without acute bleeding  However, anticoagulation was not resumed due to patient's fall risk with advanced age and dementia    Would not resume at this time     Hypertension  -blood pressure elevated initially, improved upon recheck, continue present regimen

## 2018-06-06 NOTE — PROGRESS NOTES
Please call patient with pathology results - gastritis (inflammation of the stomach)  She needs a repeat EGD in 1 year

## 2018-06-08 ENCOUNTER — OFFICE VISIT (OUTPATIENT)
Dept: FAMILY MEDICINE CLINIC | Facility: CLINIC | Age: 83
End: 2018-06-08
Payer: MEDICARE

## 2018-06-08 VITALS
HEIGHT: 61 IN | TEMPERATURE: 98.7 F | OXYGEN SATURATION: 97 % | DIASTOLIC BLOOD PRESSURE: 64 MMHG | HEART RATE: 82 BPM | WEIGHT: 122 LBS | BODY MASS INDEX: 23.03 KG/M2 | SYSTOLIC BLOOD PRESSURE: 110 MMHG | RESPIRATION RATE: 16 BRPM

## 2018-06-08 DIAGNOSIS — A04.72 C. DIFFICILE DIARRHEA: Primary | ICD-10-CM

## 2018-06-08 DIAGNOSIS — I48.20 CHRONIC ATRIAL FIBRILLATION (HCC): ICD-10-CM

## 2018-06-08 DIAGNOSIS — K21.00 GERD WITH ESOPHAGITIS: ICD-10-CM

## 2018-06-08 DIAGNOSIS — D64.9 ANEMIA, UNSPECIFIED TYPE: ICD-10-CM

## 2018-06-08 DIAGNOSIS — F02.81 LATE ONSET ALZHEIMER'S DISEASE WITH BEHAVIORAL DISTURBANCE (HCC): Chronic | ICD-10-CM

## 2018-06-08 DIAGNOSIS — G30.1 LATE ONSET ALZHEIMER'S DISEASE WITH BEHAVIORAL DISTURBANCE (HCC): Chronic | ICD-10-CM

## 2018-06-08 DIAGNOSIS — K92.1 MELENA: ICD-10-CM

## 2018-06-08 DIAGNOSIS — I10 ESSENTIAL HYPERTENSION: ICD-10-CM

## 2018-06-08 DIAGNOSIS — N18.30 CKD (CHRONIC KIDNEY DISEASE) STAGE 3, GFR 30-59 ML/MIN (HCC): ICD-10-CM

## 2018-06-08 PROCEDURE — 99495 TRANSJ CARE MGMT MOD F2F 14D: CPT | Performed by: FAMILY MEDICINE

## 2018-06-08 NOTE — PROGRESS NOTES
Assessment/Plan:  She will follow up with Cardiology and with nursing home doctor where she resides  It is recommended that she restart Eliquis in the near future  Problem List Items Addressed This Visit     Atrial fibrillation [facility])     Follow-up with Cardiology         Alzheimer's dementia with behavioral disturbance (Chronic)     Continue Aricept, Ativan and Seroquel  Melena    C  difficile diarrhea - Primary    Anemia    Hypertension    CKD (chronic kidney disease) stage 3, GFR 30-59 ml/min    GERD with esophagitis            Subjective:   Date and time hospital follow up call was made:  6/1/2018 10:39 AM  Hospital care reviewed:  Records reviewed  [facility]  Date of admission:  5/27/18  Date of discharge:  5/30/18  Diagnosis:  C  difficile diarrhea   Disposition:  Rehabilitation center  Were the patients medicaitons reviewed and updated:  Yes  Post hospital issues:  None  Should patient be enrolled in anticoag monitoring?:  No  Scheduled for follow up?:  Yes  Did you obtain your prescribed medications:  Yes  Do you need help managing your perscriptions or medications:  No  Is transportation to your appointments needed:  No  I have advised the patient to call PCP with any new or worsening symptoms (please type in name along with any credentials):  Davon Able  A  Support System:  Home care staff  Are you recieving outpatient services:  No  Are you recieving home care services:  Yes  Types of home care services:  24 hr caregiver  Are you using any community resources:  No  Current waiver service:  No  Have you fallen in the last 12 months:  No  Interperter language line required?:  No  Counseling:  Family            Patient ID: Benjamin Banuelos is a 80 y o  female  Patient comes in for hospital follow-up  She is brought in by her caregiver  She had C diff colitis, melena and esophagitis  Her medication list was reviewed and changes noted   She appears to be at her baseline at this point  The following portions of the patient's history were reviewed and updated as appropriate: allergies, current medications, past family history, past medical history, past social history, past surgical history and problem list     Review of Systems   Constitutional: Negative  HENT: Negative  Respiratory: Negative  Cardiovascular: Negative  Gastrointestinal: Negative  Objective:      /64   Pulse 82   Temp 98 7 °F (37 1 °C)   Resp 16   Ht 5' 1" (1 549 m)   Wt 55 3 kg (122 lb)   SpO2 97%   BMI 23 05 kg/m²          Physical Exam   Constitutional: She appears well-developed  HENT:   Head: Normocephalic and atraumatic  Mouth/Throat: Oropharynx is clear and moist    Eyes: EOM are normal  Pupils are equal, round, and reactive to light  Neck: Neck supple  Cardiovascular: Normal rate, regular rhythm and normal heart sounds  Pulmonary/Chest: Effort normal and breath sounds normal    Abdominal: Soft  Musculoskeletal: Normal range of motion  Neurological: She is alert  Pleasantly confused   Skin: Skin is warm and dry  Psychiatric: She has a normal mood and affect  Nursing note and vitals reviewed

## 2018-06-11 ENCOUNTER — HOSPITAL ENCOUNTER (EMERGENCY)
Facility: HOSPITAL | Age: 83
Discharge: HOME | DRG: 057 | End: 2018-06-11
Attending: EMERGENCY MEDICINE
Payer: MEDICARE

## 2018-06-11 ENCOUNTER — APPOINTMENT (EMERGENCY)
Dept: CT IMAGING | Facility: HOSPITAL | Age: 83
DRG: 057 | End: 2018-06-11
Payer: MEDICARE

## 2018-06-11 VITALS
SYSTOLIC BLOOD PRESSURE: 130 MMHG | BODY MASS INDEX: 24.3 KG/M2 | DIASTOLIC BLOOD PRESSURE: 65 MMHG | TEMPERATURE: 99.3 F | OXYGEN SATURATION: 94 % | WEIGHT: 128.6 LBS | RESPIRATION RATE: 15 BRPM | HEART RATE: 95 BPM

## 2018-06-11 DIAGNOSIS — R19.7 DIARRHEA IN ADULT PATIENT: Primary | ICD-10-CM

## 2018-06-11 LAB
ALBUMIN SERPL BCP-MCNC: 2.7 G/DL (ref 3.5–5)
ALP SERPL-CCNC: 68 U/L (ref 46–116)
ALT SERPL W P-5'-P-CCNC: 12 U/L (ref 12–78)
ANION GAP SERPL CALCULATED.3IONS-SCNC: 6 MMOL/L (ref 4–13)
AST SERPL W P-5'-P-CCNC: 16 U/L (ref 5–45)
BASOPHILS # BLD AUTO: 0.07 THOUSANDS/ΜL (ref 0–0.1)
BASOPHILS NFR BLD AUTO: 1 % (ref 0–1)
BILIRUB SERPL-MCNC: 0.6 MG/DL (ref 0.2–1)
BUN SERPL-MCNC: 20 MG/DL (ref 5–25)
C DIFF TOX GENS STL QL NAA+PROBE: ABNORMAL
CALCIUM SERPL-MCNC: 8 MG/DL (ref 8.3–10.1)
CHLORIDE SERPL-SCNC: 105 MMOL/L (ref 100–108)
CO2 SERPL-SCNC: 29 MMOL/L (ref 21–32)
CREAT SERPL-MCNC: 1.41 MG/DL (ref 0.6–1.3)
EOSINOPHIL # BLD AUTO: 0.03 THOUSAND/ΜL (ref 0–0.61)
EOSINOPHIL NFR BLD AUTO: 0 % (ref 0–6)
ERYTHROCYTE [DISTWIDTH] IN BLOOD BY AUTOMATED COUNT: 15.1 % (ref 11.6–15.1)
GFR SERPL CREATININE-BSD FRML MDRD: 32 ML/MIN/1.73SQ M
GLUCOSE SERPL-MCNC: 127 MG/DL (ref 65–140)
HCT VFR BLD AUTO: 36.1 % (ref 34.8–46.1)
HGB BLD-MCNC: 11.4 G/DL (ref 11.5–15.4)
IMM GRANULOCYTES # BLD AUTO: 0.07 THOUSAND/UL (ref 0–0.2)
IMM GRANULOCYTES NFR BLD AUTO: 1 % (ref 0–2)
LYMPHOCYTES # BLD AUTO: 0.63 THOUSANDS/ΜL (ref 0.6–4.47)
LYMPHOCYTES NFR BLD AUTO: 5 % (ref 14–44)
MCH RBC QN AUTO: 28.5 PG (ref 26.8–34.3)
MCHC RBC AUTO-ENTMCNC: 31.6 G/DL (ref 31.4–37.4)
MCV RBC AUTO: 90 FL (ref 82–98)
MONOCYTES # BLD AUTO: 0.85 THOUSAND/ΜL (ref 0.17–1.22)
MONOCYTES NFR BLD AUTO: 6 % (ref 4–12)
NEUTROPHILS # BLD AUTO: 11.77 THOUSANDS/ΜL (ref 1.85–7.62)
NEUTS SEG NFR BLD AUTO: 87 % (ref 43–75)
NRBC BLD AUTO-RTO: 0 /100 WBCS
PLATELET # BLD AUTO: 218 THOUSANDS/UL (ref 149–390)
PMV BLD AUTO: 10.9 FL (ref 8.9–12.7)
POTASSIUM SERPL-SCNC: 3.9 MMOL/L (ref 3.5–5.3)
PROT SERPL-MCNC: 6.2 G/DL (ref 6.4–8.2)
RBC # BLD AUTO: 4 MILLION/UL (ref 3.81–5.12)
SODIUM SERPL-SCNC: 140 MMOL/L (ref 136–145)
WBC # BLD AUTO: 13.42 THOUSAND/UL (ref 4.31–10.16)

## 2018-06-11 PROCEDURE — 74176 CT ABD & PELVIS W/O CONTRAST: CPT

## 2018-06-11 PROCEDURE — 85025 COMPLETE CBC W/AUTO DIFF WBC: CPT | Performed by: EMERGENCY MEDICINE

## 2018-06-11 PROCEDURE — 87493 C DIFF AMPLIFIED PROBE: CPT | Performed by: EMERGENCY MEDICINE

## 2018-06-11 PROCEDURE — 99284 EMERGENCY DEPT VISIT MOD MDM: CPT

## 2018-06-11 PROCEDURE — 80053 COMPREHEN METABOLIC PANEL: CPT | Performed by: EMERGENCY MEDICINE

## 2018-06-11 PROCEDURE — 36415 COLL VENOUS BLD VENIPUNCTURE: CPT | Performed by: EMERGENCY MEDICINE

## 2018-06-11 RX ORDER — VANCOMYCIN HYDROCHLORIDE 125 MG/1
125 CAPSULE ORAL 4 TIMES DAILY
Qty: 56 CAPSULE | Refills: 0 | Status: SHIPPED | OUTPATIENT
Start: 2018-06-11 | End: 2018-06-25

## 2018-06-11 RX ADMIN — VANCOMYCIN HYDROCHLORIDE 125 MG: 5 INJECTION, POWDER, LYOPHILIZED, FOR SOLUTION INTRAVENOUS at 08:24

## 2018-06-11 NOTE — ED PROVIDER NOTES
History  Chief Complaint   Patient presents with    Diarrhea     Per EMS, pt with recent hx of C Diff with episodes of diarrhea; per pt, "I dont even know why they brought me here"; pt unable to confirm own name or birthdate     78-year-old female with a history of dementia presents with recurrent diarrhea from her skilled nursing facility  Patient recently discharged from the hospital with Clostridium difficile and per the nursing records, completed 7 of 10 days of the prescribed metronidazole  Patient not currently on metronidazole  Per the nursing facility, diarrhea recurred tonight upon their evaluation of the patient  Upon my evaluation, patient unable to provide any information regarding her history, consistent with her known diagnosis of dementia as she is in a locked dementia unit  Patient is pleasantly demented and has no complaints, answering questions appropriately however she is not oriented to place or time  Patient has no recollection of why she was brought to the emergency room  She denies any pain  Impression and plan:  Diarrhea with a broad differential   Based on patient's history and physical, highly concerning for potential recurrent C difficile  Patient had bowel movement while in the emergency room that is clinically consistent with Clostridium difficile  Will treat patient with oral vancomycin and evaluate whether the medication can be administered to the patient at her skilled nursing facility or will require admission to the hospital for continued management and evaluation  Will obtain CT imaging of patient's abdomen and pelvis although she is nontender, she is an unreliable historian  This will be to evaluate for potential side effects including toxic megacolon  Will repeat C difficile toxin testing on the stool but will not wait for this and empirically treat patient as it is highly likely this is recurrent infection              Prior to Admission Medications Prescriptions Last Dose Informant Patient Reported? Taking?    LORazepam (ATIVAN) 0 5 mg tablet  Outside Facility (Specify) Yes Yes   Sig: Take 0 5 mg by mouth every 8 (eight) hours as needed for anxiety   QUEtiapine (SEROquel) 25 mg tablet  Outside Facility (Specify) Yes Yes   Sig: Take by mouth   acetaminophen (TYLENOL) 325 mg tablet  Outside Facility (Specify) Yes Yes   Sig: Take 650 mg by mouth every 6 (six) hours as needed for mild pain   amLODIPine (NORVASC) 10 mg tablet  Outside Facility (Specify) Yes Yes   Sig: Take by mouth   atenolol (TENORMIN) 50 mg tablet  Outside Facility (Specify) Yes Yes   Sig: Take by mouth   donepezil (ARICEPT) 5 mg tablet  Outside Facility (Specify) Yes Yes   Sig: Take 5 mg by mouth daily at bedtime   loperamide (IMODIUM A-D) 2 MG tablet  Outside Facility (Specify) Yes Yes   Sig: Take 2 mg by mouth 4 (four) times a day as needed for diarrhea   memantine (NAMENDA) 5 mg tablet  Outside Facility (Specify) Yes Yes   Sig: Take 5 mg by mouth 2 (two) times a day   mupirocin (BACTROBAN) 2 % ointment  Outside Facility (Specify) Yes Yes   Sig: Apply topically daily   pantoprazole (PROTONIX) 40 mg tablet  Outside Facility (Specify) No Yes   Sig: Take 1 tablet (40 mg total) by mouth 2 (two) times a day before meals   saccharomyces boulardii (FLORASTOR) 250 mg capsule  Outside Facility (Specify) No Yes   Sig: Take 1 capsule (250 mg total) by mouth 2 (two) times a day   sucralfate (CARAFATE) 1 g/10 mL suspension  Outside Facility (Specify) No Yes   Sig: Take 10 mL (1,000 mg total) by mouth every 6 (six) hours   valsartan (DIOVAN) 320 MG tablet  Outside Facility (Specify) Yes Yes   Sig: Take by mouth      Facility-Administered Medications: None       Past Medical History:   Diagnosis Date    Alzheimer disease     Arterial occlusion     left leg    Atrial fibrillation (HCC)     Benign hypertension     Cardiac disease     Dementia     Hypertension     Renal disorder        Past Surgical History:   Procedure Laterality Date    ESOPHAGOGASTRODUODENOSCOPY N/A 5/29/2018    Procedure: ESOPHAGOGASTRODUODENOSCOPY (EGD);  pt has cdiff;  Surgeon: Nikhil Kenyon MD;  Location: [facility]; Service: Gastroenterology    THROMBECTOMY W/ EMBOLECTOMY Right 6/16/2018    Procedure: RIGHT ILEO FEMORAL THROMBOEMBOLECTOMY   WITH COMPETION ARTERIOGRAM;  Surgeon: Josh Buitrago DO;  Location: [facility];  Service: Vascular       Family History   Problem Relation Age of Onset    Hypertension Mother      I have reviewed and agree with the history as documented  Social History   Substance Use Topics    Smoking status: Never Smoker    Smokeless tobacco: Never Used    Alcohol use No        Review of Systems   Unable to perform ROS: Dementia       Physical Exam  Physical Exam   Constitutional: She appears well-developed and well-nourished  HENT:   Head: Normocephalic and atraumatic  Eyes: Pupils are equal, round, and reactive to light  Neck: Neck supple  Cardiovascular: Normal rate  Pulmonary/Chest: Effort normal and breath sounds normal    Abdominal: Soft  Bowel sounds are normal  She exhibits no distension and no mass  There is no tenderness  There is no rebound and no guarding  No hernia  Musculoskeletal: She exhibits no tenderness or deformity  Neurological: She is alert  Not oriented  No focal deficits  Skin: Skin is warm and dry  Psychiatric:   Confused  Unable to fully assess  Vitals reviewed        Vital Signs  ED Triage Vitals   Temperature Pulse Respirations Blood Pressure SpO2   06/11/18 0119 06/11/18 0117 06/11/18 0117 06/11/18 0117 06/11/18 0117   99 7 °F (37 6 °C) 95 20 167/77 95 %      Temp Source Heart Rate Source Patient Position - Orthostatic VS BP Location FiO2 (%)   06/11/18 0408 06/11/18 0117 06/11/18 0117 06/11/18 0117 --   Oral Monitor Lying Right arm       Pain Score       --                  Vitals:    06/11/18 0400 06/11/18 0532 06/11/18 0639 06/11/18 0855   BP: 146/65 158/77 141/68 130/65   Pulse: 93 97 96 95   Patient Position - Orthostatic VS: Lying Lying Lying Lying       Visual Acuity      ED Medications  Medications   vancomycin (VANCOCIN) oral solution 125 mg (125 mg Oral Given 6/11/18 0824)       Diagnostic Studies  Results Reviewed     Procedure Component Value Units Date/Time    Clostridium difficile toxin by PCR [[id number]]  (Abnormal) Collected:  06/11/18 4103    Lab Status:  Final result Specimen:  Stool from Per Rectum Updated:  06/11/18 1613     C difficile toxin by PCR POSITIVE for C difficle toxin by PCR  (A)    CMP [[id number]]  (Abnormal) Collected:  06/11/18 0135    Lab Status:  Final result Specimen:  Blood from Arm, Left Updated:  06/11/18 5663     Sodium 140 mmol/L      Potassium 3 9 mmol/L      Chloride 105 mmol/L      CO2 29 mmol/L      Anion Gap 6 mmol/L      BUN 20 mg/dL      Creatinine 1 41 (H) mg/dL      Glucose 127 mg/dL      Calcium 8 0 (L) mg/dL      AST 16 U/L      ALT 12 U/L      Alkaline Phosphatase 68 U/L      Total Protein 6 2 (L) g/dL      Albumin 2 7 (L) g/dL      Total Bilirubin 0 60 mg/dL      eGFR 32 ml/min/1 73sq m     Narrative:         National Kidney Disease Education Program recommendations are as follows:  GFR calculation is accurate only with a steady state creatinine  Chronic Kidney disease less than 60 ml/min/1 73 sq  meters  Kidney failure less than 15 ml/min/1 73 sq  meters      CBC and differential [[id number]]  (Abnormal) Collected:  06/11/18 0135    Lab Status:  Final result Specimen:  Blood from Arm, Left Updated:  06/11/18 0142     WBC 13 42 (H) Thousand/uL      RBC 4 00 Million/uL      Hemoglobin 11 4 (L) g/dL      Hematocrit 36 1 %      MCV 90 fL      MCH 28 5 pg      MCHC 31 6 g/dL      RDW 15 1 %      MPV 10 9 fL      Platelets 299 Thousands/uL      nRBC 0 /100 WBCs      Neutrophils Relative 87 (H) %      Immat GRANS % 1 %      Lymphocytes Relative 5 (L) %      Monocytes Relative 6 %      Eosinophils Relative 0 % Basophils Relative 1 %      Neutrophils Absolute 11 77 (H) Thousands/µL      Immature Grans Absolute 0 07 Thousand/uL      Lymphocytes Absolute 0 63 Thousands/µL      Monocytes Absolute 0 85 Thousand/µL      Eosinophils Absolute 0 03 Thousand/µL      Basophils Absolute 0 07 Thousands/µL                  CT abdomen pelvis wo contrast   Final Result by Gala James MD (06/11 4280)         1  Diffuse colonic wall thickening compatible with diffuse colitis  This can be infectious, idiopathic or ischemic in origin  Peter Brood should be considered  No pneumatosis coli  No portal venous air  2   Extensive diverticulosis coli with no acute diverticulitis  3   Bilateral renal atrophy, left more than right  Nonobstructing bilateral renal calculi  Workstation performed: [id number]                    Procedures  Procedures       Phone Contacts  ED Phone Contact    ED Course  ED Course as of Jun 20 0526 Mon Jun 11, 2018   0201 Finished flagyl on 6, prior to expected completion of 10 days  Was acting differently upon awakening tonight     0653 Creatinine: (!) 1 41   0653 WBC: (!) 13 42   0736 Considering patient's recurrent c  Diff, will start oral vancomycin and admit until supplies can be obtained to ensure patient able to have medications at SNF  MDM  CritCare Time    Disposition  Final diagnoses:   Diarrhea in adult patient     Time reflects when diagnosis was documented in both MDM as applicable and the Disposition within this note     Time User Action Codes Description Comment    6/11/2018  9:01 AM Harley العراقي Add [R19 7] Diarrhea in adult patient       ED Disposition     ED Disposition Condition Comment    Discharge  [facility] discharge to home/self care      Condition at discharge: Stable        Follow-up Information     Follow up With Specialties Details Why Contact Info    Maria Antonia Grady, [phone], Nurse Practitioner   [phone]  [phone]            Discharge Medication List as of 6/11/2018  9:02 AM      START taking these medications    Details   vancomycin (VANCOCIN) 125 MG capsule Take 1 capsule (125 mg total) by mouth 4 (four) times a day for 14 days, Starting Mon 6/11/2018, Until Mon 6/25/2018, Print         CONTINUE these medications which have NOT CHANGED    Details   acetaminophen (TYLENOL) 325 mg tablet Take 650 mg by mouth every 6 (six) hours as needed for mild pain, Historical Med      amLODIPine (NORVASC) 10 mg tablet Take by mouth, Historical Med      atenolol (TENORMIN) 50 mg tablet Take by mouth, Starting Wed 6/7/2017, Historical Med      donepezil (ARICEPT) 5 mg tablet Take 5 mg by mouth daily at bedtime, Historical Med      loperamide (IMODIUM A-D) 2 MG tablet Take 2 mg by mouth 4 (four) times a day as needed for diarrhea, Historical Med      LORazepam (ATIVAN) 0 5 mg tablet Take 0 5 mg by mouth every 8 (eight) hours as needed for anxiety, Historical Med      memantine (NAMENDA) 5 mg tablet Take 5 mg by mouth 2 (two) times a day, Historical Med      mupirocin (BACTROBAN) 2 % ointment Apply topically daily, Historical Med      pantoprazole (PROTONIX) 40 mg tablet Take 1 tablet (40 mg total) by mouth 2 (two) times a day before meals, Starting Wed 5/30/2018, Print      QUEtiapine (SEROquel) 25 mg tablet Take by mouth, Historical Med      saccharomyces boulardii (FLORASTOR) 250 mg capsule Take 1 capsule (250 mg total) by mouth 2 (two) times a day, Starting Wed 5/30/2018, Print      sucralfate (CARAFATE) 1 g/10 mL suspension Take 10 mL (1,000 mg total) by mouth every 6 (six) hours, Starting Wed 5/30/2018, Print      valsartan (DIOVAN) 320 MG tablet Take by mouth, Historical Med      cyanocobalamin (VITAMIN B-12) 500 mcg tablet Take 500 mcg by mouth daily, Historical Med           No discharge procedures on file      ED Provider  Electronically Signed by           Joyce Hope MD  06/20/18 9889

## 2018-06-11 NOTE — SOCIAL WORK
SLIM consulted CM as pt needed medication assistance to determine cost for oral vanco  CM s/w ED Attending who initiated consult and provided script  CM s/w [address] who reported pt's scripts are filled at [facility] [phone] and CM could call to determine cost  CM faxed script and s/w [facility] who reported 14 days of oral vanco will be $82 17  Per nurse pt is setup for 11am return to [address] and she already called in report to [address]  CM s/w Rachana Siddiqi and provided update; Nilson Barnhart confirmed pt can return  CM lm for pt's son to provide update and provided cb information

## 2018-06-11 NOTE — DISCHARGE INSTRUCTIONS
Acute Diarrhea   WHAT YOU NEED TO KNOW:   Acute diarrhea starts quickly and lasts a short time, usually 1 to 3 days  It can last up to 2 weeks  You may not be able to control your diarrhea  Acute diarrhea usually stops on its own  DISCHARGE INSTRUCTIONS:   Return to the emergency department if:   · You feel confused  · Your heartbeat is faster than normal      · Your eyes look deeply sunken, or you have no tears when you cry  · You urinate less than usual, or your urine is dark yellow  · You have blood or mucus in your stools  · You have severe abdominal pain  · You are unable to drink any liquids  Contact your healthcare provider if:   · Your symptoms do not get better with treatment  · You have a fever higher than 101 3°F (38 5°C)  · You have trouble eating and drinking because you are vomiting  · You are thirsty or have a dry mouth  · Your diarrhea does not get better in 7 days  · You have questions or concerns about your condition or care  Follow up with your healthcare provider as directed:  Write down your questions so you remember to ask them during your visits  Medicines:  · Diarrhea medicine  is an over-the-counter medicine that helps slow or stop your diarrhea  If you take other medicines, talk to your healthcare provider before you take diarrhea medicine  · Antibiotics  may be given to help treat an infection caused by bacteria  · Antiparasitics  may be given to treat an infection caused by parasites  · Take your medicine as directed  Contact your healthcare provider if you think your medicine is not helping or if you have side effects  Tell him of her if you are allergic to any medicine  Keep a list of the medicines, vitamins, and herbs you take  Include the amounts, and when and why you take them  Bring the list or the pill bottles to follow-up visits  Carry your medicine list with you in case of an emergency    Self-care:   · Drink liquids as directed  Liquids will help prevent dehydration caused by diarrhea  Ask your healthcare provider how much liquid to drink each day and which liquids are best for you  You may need to drink an oral rehydration solution (ORS)  An ORS has the right amounts of water, salts, and sugar you need to replace body fluids  You can buy an ORS at most grocery stores and pharmacies  · Eat foods that are easy to digest   Examples include rice, lentils, cereal, bananas, potatoes, and bread  It also includes some fruits (bananas, melon), well-cooked vegetables, and lean meats  Avoid foods high in fiber, fat, and sugar  Also avoid caffeine, alcohol, dairy, and red meat until your diarrhea is gone  Prevent acute diarrhea:   · Wash your hands often  Use soap and water  Wash your hands before you eat or prepare food  Also wash your hands after you use the bathroom  Use an alcohol-based hand gel when soap and water are not available  · Keep bathroom surfaces clean  This helps prevent the spread of germs that cause acute diarrhea  · Wash fruits and vegetables well before you eat them  This can help remove germs that cause diarrhea  If possible, remove the skin from fruits and vegetables, or cook them well before you eat them  · Cook meat as directed  ¨ Cook ground meat  to 160°F      ¨ Cook ground poultry, whole poultry, or cuts of poultry  to at least 165°F  Remove the meat from heat  Let it stand for 3 minutes before you eat it  ¨ Cook whole cuts of meat other than poultry  to at least 145°F  Remove the meat from heat  Let it stand for 3 minutes before you eat it  · Wash dishes that have touched raw meat with hot water and soap  This includes cutting boards, utensils, dishes, and serving containers  · Place raw or cooked meat in the refrigerator as soon as possible  Bacteria can grow in meat that is left at room temperature too long  · Do not eat raw or undercooked oysters, clams, or mussels  These foods may be contaminated and cause infection  · Drink filtered or treated water only when you travel  Do not put ice in your drinks  Drink bottled water whenever possible  © 2017 [address] Information is for End User's use only and may not be sold, redistributed or otherwise used for commercial purposes  All illustrations and images included in CareNotes® are the copyrighted property of A D A M , Inc  or Leonel Ortega  The above information is an  only  It is not intended as medical advice for individual conditions or treatments  Talk to your doctor, nurse or pharmacist before following any medical regimen to see if it is safe and effective for you

## 2018-06-13 ENCOUNTER — APPOINTMENT (EMERGENCY)
Dept: CT IMAGING | Facility: HOSPITAL | Age: 83
DRG: 057 | End: 2018-06-13
Payer: MEDICARE

## 2018-06-13 ENCOUNTER — HOSPITAL ENCOUNTER (EMERGENCY)
Facility: HOSPITAL | Age: 83
Discharge: NURSING, REHAB OR LONG-TERM CARE | DRG: 057 | End: 2018-06-13
Attending: EMERGENCY MEDICINE | Admitting: EMERGENCY MEDICINE
Payer: MEDICARE

## 2018-06-13 VITALS
HEART RATE: 100 BPM | RESPIRATION RATE: 16 BRPM | WEIGHT: 119.71 LBS | TEMPERATURE: 98.3 F | DIASTOLIC BLOOD PRESSURE: 81 MMHG | OXYGEN SATURATION: 96 % | BODY MASS INDEX: 22.62 KG/M2 | SYSTOLIC BLOOD PRESSURE: 133 MMHG

## 2018-06-13 DIAGNOSIS — R26.2 AMBULATORY DYSFUNCTION: ICD-10-CM
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
## 2018-06-13 DIAGNOSIS — M48.54XA PATHOLOGIC COMPRESSION FRACTURE OF THORACIC VERTEBRA (HCC): ICD-10-CM

## 2018-06-13 LAB
ANION GAP SERPL CALCULATED.3IONS-SCNC: 9 MMOL/L (ref 4–13)
BUN SERPL-MCNC: 19 MG/DL (ref 5–25)
CALCIUM SERPL-MCNC: 8.1 MG/DL (ref 8.3–10.1)
CHLORIDE SERPL-SCNC: 104 MMOL/L (ref 100–108)
CO2 SERPL-SCNC: 27 MMOL/L (ref 21–32)
CREAT SERPL-MCNC: 1.26 MG/DL (ref 0.6–1.3)
GFR SERPL CREATININE-BSD FRML MDRD: 37 ML/MIN/1.73SQ M
GLUCOSE SERPL-MCNC: 110 MG/DL (ref 65–140)
POTASSIUM SERPL-SCNC: 3.1 MMOL/L (ref 3.5–5.3)
SODIUM SERPL-SCNC: 140 MMOL/L (ref 136–145)

## 2018-06-13 PROCEDURE — 70450 CT HEAD/BRAIN W/O DYE: CPT

## 2018-06-13 PROCEDURE — 80048 BASIC METABOLIC PNL TOTAL CA: CPT | Performed by: EMERGENCY MEDICINE

## 2018-06-13 PROCEDURE — 96360 HYDRATION IV INFUSION INIT: CPT

## 2018-06-13 PROCEDURE — 72125 CT NECK SPINE W/O DYE: CPT

## 2018-06-13 PROCEDURE — 36415 COLL VENOUS BLD VENIPUNCTURE: CPT | Performed by: EMERGENCY MEDICINE

## 2018-06-13 PROCEDURE — 99285 EMERGENCY DEPT VISIT HI MDM: CPT

## 2018-06-13 RX ORDER — POTASSIUM CHLORIDE 20 MEQ/1
40 TABLET, EXTENDED RELEASE ORAL ONCE
Status: COMPLETED | OUTPATIENT
Start: 2018-06-13 | End: 2018-06-13

## 2018-06-13 RX ADMIN — SODIUM CHLORIDE 1000 ML: 0.9 INJECTION, SOLUTION INTRAVENOUS at 12:13

## 2018-06-13 RX ADMIN — POTASSIUM CHLORIDE 40 MEQ: 1500 TABLET, EXTENDED RELEASE ORAL at 12:42

## 2018-06-13 NOTE — ED NOTES
Notified Irene that patient will be returning to facility at approximately 1500 with [address], RN  06/13/18 9228

## 2018-06-13 NOTE — DISCHARGE INSTRUCTIONS
Fall Prevention for Older Adults   WHAT YOU NEED TO KNOW:   As you age, your muscles weaken and your risk for falls increases  Your risk also increases if you take medicines that make you sleepy or dizzy  You may also be at risk if you have vision or joint problems, have low blood pressure, or are not active  DISCHARGE INSTRUCTIONS:   Call 911 or have someone else call if:   · You have fallen and are unconscious  · You have fallen and cannot move part of your body  Contact your healthcare provider if:   · You have fallen and have pain or a headache  · You have questions or concerns about your condition or care  Fall prevention tips:   · Stay active  Exercise can help strengthen your muscles and improve your balance  Your healthcare provider may recommend water aerobics, walking, or Jayro Chi  He may also recommend physical therapy to improve your coordination  Never start an exercise program without asking your healthcare provider first     · Wear shoes that fit well and have soles that   Wear shoes both inside and outside  Use slippers with good   Avoid shoes with high heels  · Use assistive devices as directed  Your healthcare provider may suggest that you use a cane or walker to help you keep your balance  You may need to have grab bars put in your bathroom near the toilet or in the shower  · Stand or sit up slowly  This may help you keep your balance and prevent falls  · Wear a personal alarm  This is a device that allows you to call 911 if you need help  Ask for more information on personal alarms  · Manage your medical conditions  Keep all appointments with your healthcare providers  Visit your eye doctor as directed  Home safety tips:   · Add items to prevent falls in the bathroom  Put nonslip strips on your bath or shower floor to prevent you from slipping  Use a bath mat if you do not have carpet in the bathroom   This will prevent you from falling when you step out of the bath or shower  Use a shower seat so you do not need to stand while you shower  Sit on the toilet or a chair in your bathroom to dry yourself and put on clothing  This will prevent you from losing your balance from drying or dressing yourself while you are standing  · Keep paths clear  Remove books, shoes, and other objects from walkways and stairs  Place cords for telephones and lamps out of the way so that you do not need to walk over them  Tape them down if you cannot move them  Remove small rugs  If you cannot remove a rug, secure it with double-sided tape  This will prevent you from tripping  · Install bright lights in your home  Use night lights to help light paths to the bathroom or kitchen  Always turn on the light before you start walking  · Keep items you use often on shelves within reach  Do not use a step stool to help you reach an item  · Paint or place reflective tape on the edges of your stairs  This will help you see the stairs better  Follow up with your healthcare provider as directed:  Write down your questions so you remember to ask them during your visits  © 2017 [address] Information is for End User's use only and may not be sold, redistributed or otherwise used for commercial purposes  All illustrations and images included in CareNotes® are the copyrighted property of [employer]  or Leonel Ortega  The above information is an  only  It is not intended as medical advice for individual conditions or treatments  Talk to your doctor, nurse or pharmacist before following any medical regimen to see if it is safe and effective for you  RICE Therapy   WHAT YOU NEED TO KNOW:   What is RICE therapy? RICE therapy is a 4-step process used to reduce swelling and pain from an injury  RICE stands for rest, ice, compression, and elevation  RICE should be done within the first 24 to 48 hours after an injury         How do I use RICE therapy? · Rest  the injured area so that it can heal  You may need to avoid putting any weight on your injury for at least 48 hours  Return to normal activities as directed  · Ice  the injury for 20 minutes every 4 hours, or as directed  Use an ice pack, or put crushed ice in a plastic bag  Cover it with a towel to protect your skin  Ice helps prevent tissue damage and decreases swelling and pain  · Compress  the injury with an elastic bandage, air cast, special boot, or splint to reduce swelling  Ask your healthcare provider which compression device to use, and how tight it should be  · Elevate  the injured area above the level of your heart as often as you can  This will help decrease swelling and pain  If possible, prop the injured area on pillows or blankets to keep it elevated comfortably  When should I contact my healthcare provider? · Your pain does not decrease, even after treatment  · You have questions or concerns about your condition or care  When should I seek immediate care? · You have severe pain in the injured area  · You have numbness in the injured area  · You cannot put any weight on or move the injured area  CARE AGREEMENT:   You have the right to help plan your care  Learn about your health condition and how it may be treated  Discuss treatment options with your caregivers to decide what care you want to receive  You always have the right to refuse treatment  The above information is an  only  It is not intended as medical advice for individual conditions or treatments  Talk to your doctor, nurse or pharmacist before following any medical regimen to see if it is safe and effective for you  © 2017 [address] Information is for End User's use only and may not be sold, redistributed or otherwise used for commercial purposes   All illustrations and images included in CareNotes® are the copyrighted property of A D A [employer]  or Leonel Ortega

## 2018-06-13 NOTE — ED NOTES
Updated Markos Villalobos from [facility] [place] on plan on care for patient     Andrez Lofton RN  06/13/18 7032

## 2018-06-13 NOTE — ED NOTES
Pattie Christianson, at bedside   [facility] with CM notified and will be down shortly to talk to family member     Iris Bazzi RN  06/13/18 9777

## 2018-06-13 NOTE — SOCIAL WORK
SLIM consulted CM as pt can return to [facility] and does not need to be admitted at this time  Pt has hx of dementia  Pt fell and has compression fractures but family and pt is not interested any interventions at this time  Pt is also on IV abx for Cdiff  CM s/w Silverio Espinal who was in agreement to pt returning  CM updated ED attending who requested CM discuss with pt's family  CM met with pt and grandson at bedside to review  Pt's grandson in agreement for pt to return  Pt's grandson requested walker to assist with ambulation as he takes pt in the community once per week  Pt's grandson requested pt have transportation back to the facility today  CM agreed to setup and discuss walker with ED Attending  CM s/w ED Attending and VINI to provide update  CM requested walker and ED Attending in agreement  Pt to have labs and plan to dc to [facility] this afternoon  CM setup [facility] transportation at 1500  CM updated nurse  Pt has no other needs at this time

## 2018-06-13 NOTE — ED NOTES
Spoke to Jin Daugherty from PT  Informed her on consult needs for patient  PT stated someone from the department will be down to evaluate her as soon as they can   Eval should be completed before 1-2pm     Ashli Lilly RN  06/13/18 7800

## 2018-06-13 NOTE — ED PROVIDER NOTES
History  Chief Complaint   Patient presents with   Learta January Fall     pt had an unwitnessed fall at [facility]  staff heard the fall but did not see it, she was found on her left side  No complaints of pain     History of Present Illness   80 y o  female presents to the ED after unwitnessed fall at her SNF  Staff heard the patient fall and responded immediately  Patient has severe dementia, is in a locked dementia unit  Patient does not recall any of the episodes  She states she is currently asymptomatic but is an unreliable historian  Patient does have Clostridium difficile but is being treated appropriately, which I verified from her medical record  PHYSICAL EXAM:   Primary Exam   A: Patent   B: Bilateral equal breath sounds   C: Pulses intact in all extremities, no active bleeding   D: No signs of gross motor or cognitive neurologic impairment     Secondary Exam   Constitutional: No acute distress  HENT: Normocephalic and atraumatic  Normal pharyngeal exam  No hemotympanum, raccoon eyes or Talley sign  Eyes: No hyphema  EOMI  PERRL  Neck: No clear midline tenderness, supple  CV: Regular rate and rhythm, no murmur  Peripheral pulses intact  Respiratory: No traumatic findings  Lungs clear to auscultation bilaterally  Chest nontender  Abdomen: No traumatic findings  Soft, Non-tender, non-distended  Back: No clear vertebral tenderness, step-offs or crepitus  Skin: Normal color, warm and dry   Extremities: Non-tender on full examination upper and lower extremities with no apparent traumatic findings, no deformities  Neuro: Awake, alert, disoriented and confused  No gross motor deficits     Medical Decision Making   78-year-old female presenting status post fall at her skilled nursing facility  Fall was unwitnessed but responded immediately    Patient was alert upon staff arrival   Is unclear if patient struck her head but considering her history of dementia, will obtain CT imaging of patient's head and neck to evaluate for potential intracranial process or vertebral fracture  Patient has no midline tenderness throughout her spine however she is unable to be cleared by nexus due to her dementia  If imaging is negative, will send patient back to her monitored setting for continued evaluation  Prior to Admission Medications   Prescriptions Last Dose Informant Patient Reported? Taking?    LORazepam (ATIVAN) 0 5 mg tablet  Outside Facility (Specify) Yes No   Sig: Take 0 5 mg by mouth every 8 (eight) hours as needed for anxiety   QUEtiapine (SEROquel) 25 mg tablet  Outside Facility (Specify) Yes No   Sig: Take by mouth   acetaminophen (TYLENOL) 325 mg tablet  Outside Facility (Specify) Yes No   Sig: Take 650 mg by mouth every 6 (six) hours as needed for mild pain   amLODIPine (NORVASC) 10 mg tablet  Outside Facility (Specify) Yes No   Sig: Take by mouth   atenolol (TENORMIN) 50 mg tablet  Outside Facility (Specify) Yes No   Sig: Take by mouth   donepezil (ARICEPT) 5 mg tablet  Outside Facility (Specify) Yes No   Sig: Take 5 mg by mouth daily at bedtime   loperamide (IMODIUM A-D) 2 MG tablet  Outside Facility (Specify) Yes No   Sig: Take 2 mg by mouth 4 (four) times a day as needed for diarrhea   memantine (NAMENDA) 5 mg tablet  Outside Facility (Specify) Yes No   Sig: Take 5 mg by mouth 2 (two) times a day   mupirocin (BACTROBAN) 2 % ointment  Outside Facility (Specify) Yes No   Sig: Apply topically daily   pantoprazole (PROTONIX) 40 mg tablet  Outside Facility (Specify) No No   Sig: Take 1 tablet (40 mg total) by mouth 2 (two) times a day before meals   saccharomyces boulardii (FLORASTOR) 250 mg capsule  Outside Facility (Specify) No No   Sig: Take 1 capsule (250 mg total) by mouth 2 (two) times a day   sucralfate (CARAFATE) 1 g/10 mL suspension  Outside Facility (Specify) No No   Sig: Take 10 mL (1,000 mg total) by mouth every 6 (six) hours   valsartan (DIOVAN) 320 MG tablet  Outside Facility (Specify) Yes No   Sig: Take by mouth   vancomycin (VANCOCIN) 125 MG capsule   No No   Sig: Take 1 capsule (125 mg total) by mouth 4 (four) times a day for 14 days      Facility-Administered Medications: None       Past Medical History:   Diagnosis Date    Alzheimer disease     Arterial occlusion     left leg    Atrial fibrillation ([address] )     Benign hypertension     Cardiac disease     Dementia     Hypertension     Renal disorder        Past Surgical History:   Procedure Laterality Date    ESOPHAGOGASTRODUODENOSCOPY N/A 5/29/2018    Procedure: ESOPHAGOGASTRODUODENOSCOPY (EGD);  pt has cdiff;  Surgeon: Mani Wilkinson MD;  Location: [facility]; Service: Gastroenterology    THROMBECTOMY W/ EMBOLECTOMY Right 6/16/2018    Procedure: RIGHT ILEO FEMORAL THROMBOEMBOLECTOMY   WITH COMPETION ARTERIOGRAM;  Surgeon: Amilcar Nichols DO;  Location: [facility];  Service: Vascular       Family History   Problem Relation Age of Onset    Hypertension Mother      I have reviewed and agree with the history as documented      Social History   Substance Use Topics    Smoking status: Never Smoker    Smokeless tobacco: Never Used    Alcohol use No        Review of Systems   Unable to perform ROS: Dementia       Physical Exam  Physical Exam    Vital Signs  ED Triage Vitals   Temperature Pulse Respirations Blood Pressure SpO2   06/13/18 0345 06/13/18 0345 06/13/18 0345 06/13/18 0345 06/13/18 0345   98 3 °F (36 8 °C) 93 16 141/67 96 %      Temp Source Heart Rate Source Patient Position - Orthostatic VS BP Location FiO2 (%)   06/13/18 0345 06/13/18 0345 06/13/18 0345 06/13/18 0345 --   Oral Monitor Lying Right arm       Pain Score       06/13/18 1120       No Pain           Vitals:    06/13/18 0445 06/13/18 0645 06/13/18 0708 06/13/18 1120   BP:   140/80 133/81   Pulse: 91 91 87 100   Patient Position - Orthostatic VS:   Lying Lying       Visual Acuity      ED Medications  Medications   sodium chloride 0 9 % bolus 1,000 mL (0 mL Intravenous Stopped 6/13/18 1313)   potassium chloride (K-DUR,KLOR-CON) CR tablet 40 mEq (40 mEq Oral Given 6/13/18 1242)       Diagnostic Studies  Results Reviewed     Procedure Component Value Units Date/Time    Basic metabolic panel [[id number]]  (Abnormal) Collected:  06/13/18 1213    Lab Status:  Final result Specimen:  Blood from Arm, Right Updated:  06/13/18 1230     Sodium 140 mmol/L      Potassium 3 1 (L) mmol/L      Chloride 104 mmol/L      CO2 27 mmol/L      Anion Gap 9 mmol/L      BUN 19 mg/dL      Creatinine 1 26 mg/dL      Glucose 110 mg/dL      Calcium 8 1 (L) mg/dL      eGFR 37 ml/min/1 73sq m     Narrative:         National Kidney Disease Education Program recommendations are as follows:  GFR calculation is accurate only with a steady state creatinine  Chronic Kidney disease less than 60 ml/min/1 73 sq  meters  Kidney failure less than 15 ml/min/1 73 sq  meters  CT head without contrast   ED Interpretation by Feliberto Talavera DO (06/13 1000)   Cerebral atrophy with chronic small vessel ischemic white matter disease and chronic lacunar infarctions as described above  No acute intracranial abnormality  Final Result by Melissa Joshua DO (06/13 1206)   Cerebral atrophy with chronic small vessel ischemic white matter disease and chronic lacunar infarctions as described above  No acute intracranial abnormality  Workstation performed: [id number]         CT cervical spine without contrast   ED Interpretation by Feliberto Talavera DO (06/13 1000)   1  Stable degenerative changes of the cervical spine  2   Mild compression fractures of the T2 and T4 vertebral bodies, new from the prior study  T4 compression fracture incompletely visualized  Abnormal appearance of the T4 vertebral body, suspicious for pathologic etiology  Correlate for metastatic    disease, possibly colonic in origin  Final Result by Melissa Joshua DO (06/13 0302)   1    Stable degenerative changes of the cervical spine  2   Mild compression fractures of the T2 and T4 vertebral bodies, new from the prior study  T4 compression fracture incompletely visualized  Abnormal appearance of the T4 vertebral body, suspicious for pathologic etiology  Correlate for metastatic    disease, possibly colonic in origin  I personally discussed this study with [email or web address] N Edgar Coker on 6/13/2018 at 5:43 AM                Workstation performed: [id number]                    Procedures  Procedures       Phone Contacts  ED Phone Contact    ED Course  ED Course as of Jun 20 0653 Wed Jun 13, 2018   [id number] Talked with Dr Gonzalo Tovar of trauma who agreed patient would be unlikely to benefit from emergent transfer to [facility] for trauma evaluation considering it is unclear whether these fractures are related to patient's fall today  7856 I attempted to contact the patient's emergency contact, Kia León, and only obtained voice mail  Will attempt to call back  MDM  CritCare Time    Disposition  Final diagnoses:   Fall from standing   Pathologic compression fracture of thoracic vertebra [facility])   Ambulatory dysfunction     Time reflects when diagnosis was documented in both MDM as applicable and the Disposition within this note     Time User Action Codes Description Comment    6/13/2018  1:35 PM Coppersmith, Mariana Screws Add [H28  XXXA] Fall from standing     6/13/2018  1:35 PM Coppersmith, Mariana Screws Add [O04 91GI] Pathologic compression fracture of thoracic vertebra ([address] )     6/13/2018  1:36 PM Coppersmith, Mariana Screws Add [R26 2] Ambulatory dysfunction       ED Disposition     ED Disposition Condition Comment    Discharge  Anthony Garduno discharge to home/self care    She will go back to [facility], she is in the locked dementia unit    Condition at discharge: Good        Follow-up Information     Follow up With Specialties Details Why Contact Info Additional Information    [facility] Emergency Department Emergency Medicine  If symptoms worsen:  Worsening pain, repetitive falls, hit head, etc [address] [facility] [place] [id number]  [phone] [facility], [address] [place] [address], [facility]    Primary care provider for further follow-up and to ensure improvement from this visit               Discharge Medication List as of 6/13/2018  1:39 PM      CONTINUE these medications which have NOT CHANGED    Details   acetaminophen (TYLENOL) 325 mg tablet Take 650 mg by mouth every 6 (six) hours as needed for mild pain, Historical Med      amLODIPine (NORVASC) 10 mg tablet Take by mouth, Historical Med      atenolol (TENORMIN) 50 mg tablet Take by mouth, Starting Wed 6/7/2017, Historical Med      donepezil (ARICEPT) 5 mg tablet Take 5 mg by mouth daily at bedtime, Historical Med      loperamide (IMODIUM A-D) 2 MG tablet Take 2 mg by mouth 4 (four) times a day as needed for diarrhea, Historical Med      LORazepam (ATIVAN) 0 5 mg tablet Take 0 5 mg by mouth every 8 (eight) hours as needed for anxiety, Historical Med      memantine (NAMENDA) 5 mg tablet Take 5 mg by mouth 2 (two) times a day, Historical Med      mupirocin (BACTROBAN) 2 % ointment Apply topically daily, Historical Med      pantoprazole (PROTONIX) 40 mg tablet Take 1 tablet (40 mg total) by mouth 2 (two) times a day before meals, Starting Wed 5/30/2018, Print      QUEtiapine (SEROquel) 25 mg tablet Take by mouth, Historical Med      saccharomyces boulardii (FLORASTOR) 250 mg capsule Take 1 capsule (250 mg total) by mouth 2 (two) times a day, Starting Wed 5/30/2018, Print      sucralfate (CARAFATE) 1 g/10 mL suspension Take 10 mL (1,000 mg total) by mouth every 6 (six) hours, Starting Wed 5/30/2018, Print      valsartan (DIOVAN) 320 MG tablet Take by mouth, Historical Med      vancomycin (VANCOCIN) 125 MG capsule Take 1 capsule (125 mg total) by mouth 4 (four) times a day for 14 days, Starting Mon 6/11/2018, Until Mon 6/25/2018, Print      cyanocobalamin (VITAMIN B-12) 500 mcg tablet Take 500 mcg by mouth daily, Historical Med             Outpatient Discharge Orders  Maye King         ED Provider  Electronically Signed by           Rochelle Acuna MD  06/20/18 6169

## 2018-06-14 ENCOUNTER — APPOINTMENT (EMERGENCY)
Dept: CT IMAGING | Facility: HOSPITAL | Age: 83
DRG: 057 | End: 2018-06-14
Payer: MEDICARE

## 2018-06-14 ENCOUNTER — HOSPITAL ENCOUNTER (INPATIENT)
Facility: HOSPITAL | Age: 83
LOS: 2 days | DRG: 057 | End: 2018-06-16
Attending: EMERGENCY MEDICINE | Admitting: INTERNAL MEDICINE
Payer: MEDICARE

## 2018-06-14 DIAGNOSIS — F03.90 DEMENTIA (HCC): ICD-10-CM
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
## 2018-06-14 DIAGNOSIS — R73.9 HYPERGLYCEMIA: ICD-10-CM

## 2018-06-14 DIAGNOSIS — I73.9 PAD (PERIPHERAL ARTERY DISEASE) (HCC): ICD-10-CM

## 2018-06-14 PROBLEM — R53.1 WEAKNESS: Status: ACTIVE | Noted: 2018-06-14

## 2018-06-14 LAB
ANION GAP SERPL CALCULATED.3IONS-SCNC: 13 MMOL/L (ref 4–13)
BACTERIA UR QL AUTO: ABNORMAL /HPF
BASOPHILS # BLD MANUAL: 0 THOUSAND/UL (ref 0–0.1)
BASOPHILS NFR MAR MANUAL: 0 % (ref 0–1)
BILIRUB UR QL STRIP: NEGATIVE
BUN SERPL-MCNC: 14 MG/DL (ref 5–25)
CALCIUM SERPL-MCNC: 8.4 MG/DL (ref 8.3–10.1)
CHLORIDE SERPL-SCNC: 102 MMOL/L (ref 100–108)
CLARITY UR: CLEAR
CO2 SERPL-SCNC: 23 MMOL/L (ref 21–32)
COLOR UR: YELLOW
CREAT SERPL-MCNC: 1.12 MG/DL (ref 0.6–1.3)
EOSINOPHIL # BLD MANUAL: 0 THOUSAND/UL (ref 0–0.4)
EOSINOPHIL NFR BLD MANUAL: 0 % (ref 0–6)
ERYTHROCYTE [DISTWIDTH] IN BLOOD BY AUTOMATED COUNT: 14.7 % (ref 11.6–15.1)
GFR SERPL CREATININE-BSD FRML MDRD: 43 ML/MIN/1.73SQ M
GLUCOSE SERPL-MCNC: 193 MG/DL (ref 65–140)
GLUCOSE UR STRIP-MCNC: ABNORMAL MG/DL
HCT VFR BLD AUTO: 34.6 % (ref 34.8–46.1)
HGB BLD-MCNC: 11.5 G/DL (ref 11.5–15.4)
HGB UR QL STRIP.AUTO: ABNORMAL
KETONES UR STRIP-MCNC: NEGATIVE MG/DL
LEUKOCYTE ESTERASE UR QL STRIP: NEGATIVE
LYMPHOCYTES # BLD AUTO: 0.54 THOUSAND/UL (ref 0.6–4.47)
LYMPHOCYTES # BLD AUTO: 6 % (ref 14–44)
MCH RBC QN AUTO: 28.6 PG (ref 26.8–34.3)
MCHC RBC AUTO-ENTMCNC: 33.2 G/DL (ref 31.4–37.4)
MCV RBC AUTO: 86 FL (ref 82–98)
MONOCYTES # BLD AUTO: 0.18 THOUSAND/UL (ref 0–1.22)
MONOCYTES NFR BLD: 2 % (ref 4–12)
MYELOCYTES NFR BLD MANUAL: 1 % (ref 0–1)
NEUTROPHILS # BLD MANUAL: 8.07 THOUSAND/UL (ref 1.85–7.62)
NEUTS BAND NFR BLD MANUAL: 33 % (ref 0–8)
NEUTS SEG NFR BLD AUTO: 57 % (ref 43–75)
NITRITE UR QL STRIP: NEGATIVE
NON-SQ EPI CELLS URNS QL MICRO: ABNORMAL /HPF
NRBC BLD AUTO-RTO: 0 /100 WBCS
PH UR STRIP.AUTO: 6 [PH] (ref 4.5–8)
PLATELET # BLD AUTO: 277 THOUSANDS/UL (ref 149–390)
PLATELET BLD QL SMEAR: ADEQUATE
PMV BLD AUTO: 10.6 FL (ref 8.9–12.7)
POTASSIUM SERPL-SCNC: 3.7 MMOL/L (ref 3.5–5.3)
PROT UR STRIP-MCNC: ABNORMAL MG/DL
RBC # BLD AUTO: 4.02 MILLION/UL (ref 3.81–5.12)
RBC #/AREA URNS AUTO: ABNORMAL /HPF
SODIUM SERPL-SCNC: 138 MMOL/L (ref 136–145)
SP GR UR STRIP.AUTO: 1.01 (ref 1–1.03)
TOTAL CELLS COUNTED SPEC: 100
UROBILINOGEN UR QL STRIP.AUTO: 0.2 E.U./DL
VARIANT LYMPHS # BLD AUTO: 1 %
WBC # BLD AUTO: 8.97 THOUSAND/UL (ref 4.31–10.16)
WBC #/AREA URNS AUTO: ABNORMAL /HPF

## 2018-06-14 PROCEDURE — 85027 COMPLETE CBC AUTOMATED: CPT | Performed by: EMERGENCY MEDICINE

## 2018-06-14 PROCEDURE — 80048 BASIC METABOLIC PNL TOTAL CA: CPT | Performed by: EMERGENCY MEDICINE

## 2018-06-14 PROCEDURE — 36415 COLL VENOUS BLD VENIPUNCTURE: CPT | Performed by: EMERGENCY MEDICINE

## 2018-06-14 PROCEDURE — 99223 1ST HOSP IP/OBS HIGH 75: CPT | Performed by: NURSE PRACTITIONER

## 2018-06-14 PROCEDURE — 70450 CT HEAD/BRAIN W/O DYE: CPT

## 2018-06-14 PROCEDURE — 99285 EMERGENCY DEPT VISIT HI MDM: CPT

## 2018-06-14 PROCEDURE — 81001 URINALYSIS AUTO W/SCOPE: CPT | Performed by: EMERGENCY MEDICINE

## 2018-06-14 PROCEDURE — 85007 BL SMEAR W/DIFF WBC COUNT: CPT | Performed by: EMERGENCY MEDICINE

## 2018-06-14 RX ORDER — DONEPEZIL HYDROCHLORIDE 5 MG/1
5 TABLET, FILM COATED ORAL
Status: DISCONTINUED | OUTPATIENT
Start: 2018-06-14 | End: 2018-06-16 | Stop reason: HOSPADM

## 2018-06-14 RX ORDER — AMLODIPINE BESYLATE 10 MG/1
10 TABLET ORAL DAILY
Status: DISCONTINUED | OUTPATIENT
Start: 2018-06-15 | End: 2018-06-16 | Stop reason: HOSPADM

## 2018-06-14 RX ORDER — SACCHAROMYCES BOULARDII 250 MG
250 CAPSULE ORAL 2 TIMES DAILY
Status: DISCONTINUED | OUTPATIENT
Start: 2018-06-14 | End: 2018-06-16 | Stop reason: HOSPADM

## 2018-06-14 RX ORDER — SUCRALFATE ORAL 1 G/10ML
1000 SUSPENSION ORAL EVERY 6 HOURS SCHEDULED
Status: DISCONTINUED | OUTPATIENT
Start: 2018-06-15 | End: 2018-06-16 | Stop reason: HOSPADM

## 2018-06-14 RX ORDER — PANTOPRAZOLE SODIUM 40 MG/1
40 TABLET, DELAYED RELEASE ORAL
Status: DISCONTINUED | OUTPATIENT
Start: 2018-06-15 | End: 2018-06-16 | Stop reason: HOSPADM

## 2018-06-14 RX ORDER — ACETAMINOPHEN 325 MG/1
650 TABLET ORAL EVERY 6 HOURS PRN
Status: DISCONTINUED | OUTPATIENT
Start: 2018-06-14 | End: 2018-06-16 | Stop reason: HOSPADM

## 2018-06-14 RX ORDER — MEMANTINE HYDROCHLORIDE 5 MG/1
5 TABLET ORAL 2 TIMES DAILY
Status: DISCONTINUED | OUTPATIENT
Start: 2018-06-14 | End: 2018-06-16 | Stop reason: HOSPADM

## 2018-06-14 RX ORDER — ATENOLOL 50 MG/1
50 TABLET ORAL DAILY
Status: DISCONTINUED | OUTPATIENT
Start: 2018-06-15 | End: 2018-06-16 | Stop reason: HOSPADM

## 2018-06-14 RX ORDER — QUETIAPINE FUMARATE 25 MG/1
25 TABLET, FILM COATED ORAL
Status: DISCONTINUED | OUTPATIENT
Start: 2018-06-14 | End: 2018-06-16 | Stop reason: HOSPADM

## 2018-06-14 RX ORDER — VALSARTAN 160 MG/1
320 TABLET ORAL DAILY
Status: DISCONTINUED | OUTPATIENT
Start: 2018-06-15 | End: 2018-06-16 | Stop reason: HOSPADM

## 2018-06-14 RX ORDER — LOPERAMIDE HYDROCHLORIDE 2 MG/1
2 CAPSULE ORAL 4 TIMES DAILY PRN
Status: DISCONTINUED | OUTPATIENT
Start: 2018-06-14 | End: 2018-06-16 | Stop reason: HOSPADM

## 2018-06-14 RX ORDER — CHOLECALCIFEROL (VITAMIN D3) 125 MCG
500 CAPSULE ORAL DAILY
Status: DISCONTINUED | OUTPATIENT
Start: 2018-06-15 | End: 2018-06-16 | Stop reason: HOSPADM

## 2018-06-14 RX ORDER — LORAZEPAM 0.5 MG/1
0.5 TABLET ORAL EVERY 8 HOURS PRN
Status: DISCONTINUED | OUTPATIENT
Start: 2018-06-14 | End: 2018-06-16 | Stop reason: HOSPADM

## 2018-06-14 RX ADMIN — Medication 250 MG: at 23:07

## 2018-06-14 RX ADMIN — LORAZEPAM 0.5 MG: 0.5 TABLET ORAL at 21:19

## 2018-06-14 RX ADMIN — MEMANTINE HYDROCHLORIDE 5 MG: 5 TABLET ORAL at 21:19

## 2018-06-14 RX ADMIN — VANCOMYCIN HYDROCHLORIDE 125 MG: 5 INJECTION, POWDER, LYOPHILIZED, FOR SOLUTION INTRAVENOUS at 23:06

## 2018-06-14 RX ADMIN — SUCRALFATE 1000 MG: 1 SUSPENSION ORAL at 23:08

## 2018-06-14 RX ADMIN — DONEPEZIL HYDROCHLORIDE 5 MG: 5 TABLET ORAL at 21:19

## 2018-06-14 RX ADMIN — ACETAMINOPHEN 650 MG: 325 TABLET, FILM COATED ORAL at 23:07

## 2018-06-14 RX ADMIN — QUETIAPINE FUMARATE 25 MG: 25 TABLET ORAL at 21:19

## 2018-06-14 NOTE — ED PROVIDER NOTES
Pt Name: Bradley Wyatt  MRN: [id number]  [place] 3/28/1926  Age/Sex: 80 y o  female  Date of evaluation: 6/14/2018  PCP: No primary care provider on file  CHIEF COMPLAINT    Chief Complaint   Patient presents with    Fall     Per EMS, [facility] stated they found patient on her knees and she could not stand up  Pt was recently discharged from ED yesterday  Pt has no complaints at this time         HPI    80 y o  female sent in from residential facility by EMS after she was found on her knees by the bed after presumed fall  Patient was seen yesterday in this ER after a fall as well and discharged back to that facility  Patient also actively being treated for C difficile per medical records  Remainder of HPI limited as patient is demented and has poor recall and appears unaware of why she is here in the emergency department  Patient complains of a headache, but denies any other pain  HPI      Past Medical and Surgical History    Past Medical History:   Diagnosis Date    Alzheimer disease     Arterial occlusion     left leg    Atrial fibrillation ([address] )     Benign hypertension     Cardiac disease     Dementia     Hypertension     Renal disorder        Past Surgical History:   Procedure Laterality Date    ESOPHAGOGASTRODUODENOSCOPY N/A 5/29/2018    Procedure: ESOPHAGOGASTRODUODENOSCOPY (EGD);  pt has cdiff;  Surgeon: Nikhil Kenyon MD;  Location: [facility]; Service: Gastroenterology       Family History   Problem Relation Age of Onset    Hypertension Mother        Social History   Substance Use Topics    Smoking status: Never Smoker    Smokeless tobacco: Never Used    Alcohol use No           Allergies    No Known Allergies    Home Medications    Prior to Admission medications    Medication Sig Start Date End Date Taking?  Authorizing Provider   acetaminophen (TYLENOL) 325 mg tablet Take 650 mg by mouth every 6 (six) hours as needed for mild pain    Historical Provider, MD   amLODIPine (NORVASC) 10 mg tablet Take by mouth    Historical Provider, MD   atenolol (TENORMIN) 50 mg tablet Take by mouth 6/7/17   Historical Provider, MD   cyanocobalamin (VITAMIN B-12) 500 mcg tablet Take 500 mcg by mouth daily    Historical Provider, MD   donepezil (ARICEPT) 5 mg tablet Take 5 mg by mouth daily at bedtime    Historical Provider, MD   loperamide (IMODIUM A-D) 2 MG tablet Take 2 mg by mouth 4 (four) times a day as needed for diarrhea    Historical Provider, MD   LORazepam (ATIVAN) 0 5 mg tablet Take 0 5 mg by mouth every 8 (eight) hours as needed for anxiety    Historical Provider, MD   memantine (NAMENDA) 5 mg tablet Take 5 mg by mouth 2 (two) times a day    Historical Provider, MD   mupirocin (BACTROBAN) 2 % ointment Apply topically daily    Historical Provider, MD   pantoprazole (PROTONIX) 40 mg tablet Take 1 tablet (40 mg total) by mouth 2 (two) times a day before meals 5/30/18   Sybil Ballesteros MD   QUEtiapine (SEROquel) 25 mg tablet Take by mouth    Historical Provider, MD   saccharomyces boulardii (FLORASTOR) 250 mg capsule Take 1 capsule (250 mg total) by mouth 2 (two) times a day 5/30/18   Sybil Ballesteros MD   sucralfate (CARAFATE) 1 g/10 mL suspension Take 10 mL (1,000 mg total) by mouth every 6 (six) hours 5/30/18   Sybil Ballesteros MD   valsartan (DIOVAN) 320 MG tablet Take by mouth    Historical Provider, MD   vancomycin (VANCOCIN) 125 MG capsule Take 1 capsule (125 mg total) by mouth 4 (four) times a day for 14 days 6/11/18 6/25/18  Fouzia Bill DO           Review of Systems    Review of Systems   Unable to perform ROS: Dementia   Constitutional: Negative for fever  HENT: Negative for drooling and facial swelling  Eyes: Negative for discharge and redness  Respiratory: Negative for apnea, cough and shortness of breath  Cardiovascular: Negative for leg swelling  Gastrointestinal: Positive for diarrhea  Negative for vomiting  Neurological: Positive for headaches  Psychiatric/Behavioral: Positive for confusion  All other systems reviewed and negative  Physical Exam      ED Triage Vitals [06/14/18 1719]   Temperature Pulse Respirations Blood Pressure SpO2   97 7 °F (36 5 °C) 92 20 135/91 96 %      Temp Source Heart Rate Source Patient Position - Orthostatic VS BP Location FiO2 (%)   Oral Monitor Lying Right arm --      Pain Score       No Pain               Physical Exam   Constitutional: She is oriented to person, place, and time  She appears well-developed and well-nourished  No distress  HENT:   Head: Normocephalic and atraumatic  Eyes: Conjunctivae and EOM are normal  Pupils are equal, round, and reactive to light  Neck: Normal range of motion  Neck supple  Cardiovascular: Normal rate, regular rhythm, normal heart sounds and intact distal pulses  Pulmonary/Chest: Effort normal and breath sounds normal  No respiratory distress  She has no wheezes  She has no rales  Abdominal: Soft  She exhibits no distension  There is no tenderness  There is no rebound and no guarding  Musculoskeletal: Normal range of motion  She exhibits no edema or deformity  No tenderness palpation along the limbs, full active and passive range of motion  Patient ambulating without difficulty  Neurological: She is alert and oriented to person, place, and time  Skin: Skin is warm and dry  No rash noted  No erythema  Small bruising to bilateral anterior knee,   Psychiatric: She has a normal mood and affect   Her behavior is normal  Judgment and thought content normal             Diagnostic Results      Labs:    Results for orders placed or performed during the hospital encounter of 06/14/18   CBC and differential   Result Value Ref Range    WBC 8 97 4 31 - 10 16 Thousand/uL    RBC 4 02 3 81 - 5 12 Million/uL    Hemoglobin 11 5 11 5 - 15 4 g/dL    Hematocrit 34 6 (L) 34 8 - 46 1 %    MCV 86 82 - 98 fL    MCH 28 6 26 8 - 34 3 pg    MCHC 33 2 31 4 - 37 4 g/dL    RDW 14 7 11 6 - 15 1 %    MPV 10 6 8 9 - 12 7 fL    Platelets 728 996 - 204 Thousands/uL    nRBC 0 /100 WBCs   Basic metabolic panel   Result Value Ref Range    Sodium 138 136 - 145 mmol/L    Potassium 3 7 3 5 - 5 3 mmol/L    Chloride 102 100 - 108 mmol/L    CO2 23 21 - 32 mmol/L    Anion Gap 13 4 - 13 mmol/L    BUN 14 5 - 25 mg/dL    Creatinine 1 12 0 60 - 1 30 mg/dL    Glucose 193 (H) 65 - 140 mg/dL    Calcium 8 4 8 3 - 10 1 mg/dL    eGFR 43 ml/min/1 73sq m   UA w Reflex to Microscopic w Reflex to Culture   Result Value Ref Range    Color, UA Yellow     Clarity, UA Clear     Specific [place], UA 1 015 1 003 - 1 030    pH, UA 6 0 4 5 - 8 0    Leukocytes, UA Negative Negative    Nitrite, UA Negative Negative    Protein, UA Trace (A) Negative mg/dl    Glucose,  (1/10%) (A) Negative mg/dl    Ketones, UA Negative Negative mg/dl    Urobilinogen, UA 0 2 0 2, 1 0 E U /dl E U /dl    Bilirubin, UA Negative Negative    Blood, UA Small (A) Negative   Urine Microscopic   Result Value Ref Range    RBC, UA 0-1 (A) None Seen, 0-5 /hpf    WBC, UA None Seen None Seen, 0-5, 5-55, 5-65 /hpf    Epithelial Cells None Seen None Seen, Occasional /hpf    Bacteria, UA Occasional None Seen, Occasional /hpf   Manual Differential(PHLEBS Do Not Order)   Result Value Ref Range    Segmented % 57 43 - 75 %    Bands % 33 (H) 0 - 8 %    Lymphocytes % 6 (L) 14 - 44 %    Monocytes % 2 (L) 4 - 12 %    Eosinophils % 0 0 - 6 %    Basophils % 0 0 - 1 %    Myelocytes % 1 0 - 1 %    Atypical Lymphocytes % 1 (H) <=0 %    Absolute Neutrophils 8 07 (H) 1 85 - 7 62 Thousand/uL    Lymphocytes Absolute 0 54 (L) 0 60 - 4 47 Thousand/uL    Monocytes Absolute 0 18 0 00 - 1 22 Thousand/uL    Eosinophils Absolute 0 00 0 00 - 0 40 Thousand/uL    Basophils Absolute 0 00 0 00 - 0 10 Thousand/uL    Total Counted 100     Platelet Estimate Adequate Adequate       All labs reviewed and utilized in the medical decision making process    Radiology:    CT head without contrast Final Result         1  No acute intracranial hemorrhage, mass effect or extra-axial collection  2   Chronic left MCA territory infarct  Microangiopathic disease  Workstation performed: [id number]             All radiology studies independently viewed by me and interpreted by the radiologist     Procedure    Procedures    CritCare Time      ED Course of Care and Re-Assessments      Patient urinated, and postvoid residual was 600 mL, Pugh catheter placed for drainage of same  Medications   acetaminophen (TYLENOL) tablet 650 mg (not administered)   amLODIPine (NORVASC) tablet 10 mg (not administered)   atenolol (TENORMIN) tablet 50 mg (not administered)   cyanocobalamin (VITAMIN B-12) tablet 500 mcg (not administered)   donepezil (ARICEPT) tablet 5 mg (not administered)   loperamide (IMODIUM) capsule 2 mg (not administered)   LORazepam (ATIVAN) tablet 0 5 mg (not administered)   memantine (NAMENDA) tablet 5 mg (not administered)   pantoprazole (PROTONIX) EC tablet 40 mg (not administered)   QUEtiapine (SEROquel) tablet 25 mg (not administered)   saccharomyces boulardii (FLORASTOR) capsule 250 mg (not administered)   sucralfate (CARAFATE) oral suspension 1,000 mg (not administered)   valsartan (DIOVAN) tablet 320 mg (not administered)   vancomycin (VANCOCIN) capsule 125 mg (not administered)   enoxaparin (LOVENOX) subcutaneous injection 40 mg (not administered)           FINAL IMPRESSION    Final diagnoses:   Fall, initial encounter   Dementia   Hyperglycemia         DISPOSITION/PLAN    44-year-old female with history and symptoms above  Vital signs remarkable for tachycardia, examination nonspecific  Labs sent as above, complaint of headache with unknown potential fall prompted a CT head which returned negative for acute disease    Patient seen for same complaint yesterday return to residential facility, in the setting of several falls recently patient admitted for further care and likely placement at another facility  Hemodynamically stable at time of admit  Time reflects when diagnosis was documented in both MDM as applicable and the Disposition within this note     Time User Action Codes Description Comment    6/14/2018  8:34 PM Minus Boys Add [C19  OFGL] Fall, initial encounter     6/14/2018  8:34 PM Irene BLACKWELL Add [F03 90] Dementia     6/14/2018  8:34 PM Irene BLACKWELL Add [R73 9] Hyperglycemia       ED Disposition     ED Disposition Condition Comment    Admit  Case was discussed with [facility] and the patient's admission status was agreed to be Admission Status: inpatient status to the service of Dr Maranda Lerma   Follow-up Information    None           PATIENT REFERRED TO:    No follow-up provider specified      DISCHARGE MEDICATIONS:    Current Discharge Medication List      CONTINUE these medications which have NOT CHANGED    Details   acetaminophen (TYLENOL) 325 mg tablet Take 650 mg by mouth every 6 (six) hours as needed for mild pain      amLODIPine (NORVASC) 10 mg tablet Take by mouth      atenolol (TENORMIN) 50 mg tablet Take by mouth      cyanocobalamin (VITAMIN B-12) 500 mcg tablet Take 500 mcg by mouth daily      donepezil (ARICEPT) 5 mg tablet Take 5 mg by mouth daily at bedtime      loperamide (IMODIUM A-D) 2 MG tablet Take 2 mg by mouth 4 (four) times a day as needed for diarrhea      LORazepam (ATIVAN) 0 5 mg tablet Take 0 5 mg by mouth every 8 (eight) hours as needed for anxiety      memantine (NAMENDA) 5 mg tablet Take 5 mg by mouth 2 (two) times a day      mupirocin (BACTROBAN) 2 % ointment Apply topically daily      pantoprazole (PROTONIX) 40 mg tablet Take 1 tablet (40 mg total) by mouth 2 (two) times a day before meals  Qty: 30 tablet, Refills: 0    Associated Diagnoses: Melena      QUEtiapine (SEROquel) 25 mg tablet Take by mouth      saccharomyces boulardii (FLORASTOR) 250 mg capsule Take 1 capsule (250 mg total) by mouth 2 (two) times a day  Qty: 20 capsule, Refills: 0    Associated Diagnoses: C  difficile diarrhea      sucralfate (CARAFATE) 1 g/10 mL suspension Take 10 mL (1,000 mg total) by mouth every 6 (six) hours  Qty: 420 mL, Refills: 0    Associated Diagnoses: Melena      valsartan (DIOVAN) 320 MG tablet Take by mouth      vancomycin (VANCOCIN) 125 MG capsule Take 1 capsule (125 mg total) by mouth 4 (four) times a day for 14 days  Qty: 56 capsule, Refills: 0    Associated Diagnoses: Diarrhea in adult patient             No discharge procedures on file           MD Bard Bob Issa MD  06/14/18 2798

## 2018-06-14 NOTE — ED PROVIDER NOTES
History  Chief Complaint   Patient presents with    Diarrhea     Per EMS, pt with recent hx of C Diff with episodes of diarrhea; per pt, "I dont even know why they brought me here"; pt unable to confirm own name or birthdate     26-year-old female patient seen by me in sign out  The patient was sent in for recurrent signs of C diff  Patient signed out to me for case management to see if they could get the patient out patient oral vancomycin  There were able to get the patient out patient oral vancomycin the patient was discharged back to her facility as directed  Diarrhea       Prior to Admission Medications   Prescriptions Last Dose Informant Patient Reported? Taking?    LORazepam (ATIVAN) 0 5 mg tablet  Outside Facility (Specify) Yes Yes   Sig: Take 0 5 mg by mouth every 8 (eight) hours as needed for anxiety   QUEtiapine (SEROquel) 25 mg tablet  Outside Facility (Specify) Yes Yes   Sig: Take by mouth   acetaminophen (TYLENOL) 325 mg tablet  Outside Facility (Specify) Yes Yes   Sig: Take 650 mg by mouth every 6 (six) hours as needed for mild pain   amLODIPine (NORVASC) 10 mg tablet  Outside Facility (Specify) Yes Yes   Sig: Take by mouth   atenolol (TENORMIN) 50 mg tablet  Outside Facility (Specify) Yes Yes   Sig: Take by mouth   cyanocobalamin (VITAMIN B-12) 500 mcg tablet  Outside Facility (Specify) Yes Yes   Sig: Take 500 mcg by mouth daily   donepezil (ARICEPT) 5 mg tablet  Outside Facility (Specify) Yes Yes   Sig: Take 5 mg by mouth daily at bedtime   loperamide (IMODIUM A-D) 2 MG tablet  Outside Facility (Specify) Yes Yes   Sig: Take 2 mg by mouth 4 (four) times a day as needed for diarrhea   memantine (NAMENDA) 5 mg tablet  Outside Facility (Specify) Yes Yes   Sig: Take 5 mg by mouth 2 (two) times a day   mupirocin (BACTROBAN) 2 % ointment  Outside Facility (Specify) Yes Yes   Sig: Apply topically daily   pantoprazole (PROTONIX) 40 mg tablet  Outside Facility (Specify) No Yes   Sig: Take 1 tablet (40 mg total) by mouth 2 (two) times a day before meals   saccharomyces boulardii (FLORASTOR) 250 mg capsule  Outside Facility (Specify) No Yes   Sig: Take 1 capsule (250 mg total) by mouth 2 (two) times a day   sucralfate (CARAFATE) 1 g/10 mL suspension  Outside Facility (Specify) No Yes   Sig: Take 10 mL (1,000 mg total) by mouth every 6 (six) hours   valsartan (DIOVAN) 320 MG tablet  Outside Facility (Specify) Yes Yes   Sig: Take by mouth      Facility-Administered Medications: None       Past Medical History:   Diagnosis Date    Alzheimer disease     Arterial occlusion     left leg    Atrial fibrillation (HCC)     Benign hypertension     Cardiac disease     Dementia     Hypertension     Renal disorder        Past Surgical History:   Procedure Laterality Date    ESOPHAGOGASTRODUODENOSCOPY N/A 5/29/2018    Procedure: ESOPHAGOGASTRODUODENOSCOPY (EGD);  pt has cdiff;  Surgeon: Norma Porras MD;  Location: [facility]; Service: Gastroenterology       Family History   Problem Relation Age of Onset    Hypertension Mother      I have reviewed and agree with the history as documented  Social History   Substance Use Topics    Smoking status: Never Smoker    Smokeless tobacco: Never Used    Alcohol use No        Review of Systems   Gastrointestinal: Positive for diarrhea         Physical Exam  Physical Exam    Vital Signs  ED Triage Vitals   Temperature Pulse Respirations Blood Pressure SpO2   06/11/18 0119 06/11/18 0117 06/11/18 0117 06/11/18 0117 06/11/18 0117   99 7 °F (37 6 °C) 95 20 167/77 95 %      Temp Source Heart Rate Source Patient Position - Orthostatic VS BP Location FiO2 (%)   06/11/18 0408 06/11/18 0117 06/11/18 0117 06/11/18 0117 --   Oral Monitor Lying Right arm       Pain Score       --                  Vitals:    06/11/18 0400 06/11/18 0532 06/11/18 0639 06/11/18 0855   BP: 146/65 158/77 141/68 130/65   Pulse: 93 97 96 95   Patient Position - Orthostatic VS: Lying Lying Lying Lying Visual Acuity      ED Medications  Medications   vancomycin (VANCOCIN) oral solution 125 mg (125 mg Oral Given 6/11/18 0824)       Diagnostic Studies  Results Reviewed     Procedure Component Value Units Date/Time    Clostridium difficile toxin by PCR [[id number]]  (Abnormal) Collected:  06/11/18 8905    Lab Status:  Final result Specimen:  Stool from Per Rectum Updated:  06/11/18 1613     C difficile toxin by PCR POSITIVE for C difficle toxin by PCR  (A)    CMP [[id number]]  (Abnormal) Collected:  06/11/18 0135    Lab Status:  Final result Specimen:  Blood from Arm, Left Updated:  06/11/18 6913     Sodium 140 mmol/L      Potassium 3 9 mmol/L      Chloride 105 mmol/L      CO2 29 mmol/L      Anion Gap 6 mmol/L      BUN 20 mg/dL      Creatinine 1 41 (H) mg/dL      Glucose 127 mg/dL      Calcium 8 0 (L) mg/dL      AST 16 U/L      ALT 12 U/L      Alkaline Phosphatase 68 U/L      Total Protein 6 2 (L) g/dL      Albumin 2 7 (L) g/dL      Total Bilirubin 0 60 mg/dL      eGFR 32 ml/min/1 73sq m     Narrative:         National Kidney Disease Education Program recommendations are as follows:  GFR calculation is accurate only with a steady state creatinine  Chronic Kidney disease less than 60 ml/min/1 73 sq  meters  Kidney failure less than 15 ml/min/1 73 sq  meters      CBC and differential [[id number]]  (Abnormal) Collected:  06/11/18 0135    Lab Status:  Final result Specimen:  Blood from Arm, Left Updated:  06/11/18 0142     WBC 13 42 (H) Thousand/uL      RBC 4 00 Million/uL      Hemoglobin 11 4 (L) g/dL      Hematocrit 36 1 %      MCV 90 fL      MCH 28 5 pg      MCHC 31 6 g/dL      RDW 15 1 %      MPV 10 9 fL      Platelets 566 Thousands/uL      nRBC 0 /100 WBCs      Neutrophils Relative 87 (H) %      Immat GRANS % 1 %      Lymphocytes Relative 5 (L) %      Monocytes Relative 6 %      Eosinophils Relative 0 %      Basophils Relative 1 %      Neutrophils Absolute 11 77 (H) Thousands/µL      Immature Grans Absolute 0 07 Thousand/uL      Lymphocytes Absolute 0 63 Thousands/µL      Monocytes Absolute 0 85 Thousand/µL      Eosinophils Absolute 0 03 Thousand/µL      Basophils Absolute 0 07 Thousands/µL                  CT abdomen pelvis wo contrast   Final Result by Catalina Ding MD (06/11 0450)         1  Diffuse colonic wall thickening compatible with diffuse colitis  This can be infectious, idiopathic or ischemic in origin  Arena Shaver should be considered  No pneumatosis coli  No portal venous air  2   Extensive diverticulosis coli with no acute diverticulitis  3   Bilateral renal atrophy, left more than right  Nonobstructing bilateral renal calculi  Workstation performed: [id number]                    Procedures  Procedures       Phone Contacts  ED Phone Contact    ED Course                               MDM  CritCare Time    Disposition  Final diagnoses:   Diarrhea in adult patient     Time reflects when diagnosis was documented in both MDM as applicable and the Disposition within this note     Time User Action Codes Description Comment    6/11/2018  9:01 AM Len Olivares Add [R19 7] Diarrhea in adult patient       ED Disposition     ED Disposition Condition Comment    Discharge  [facility] discharge to home/self care      Condition at discharge: Stable        Follow-up Information     Follow up With Specialties Details [id number], Nurse Practitioner   [address] [place] [address]  [phone]            Discharge Medication List as of 6/11/2018  9:02 AM      START taking these medications    Details   vancomycin (VANCOCIN) 125 MG capsule Take 1 capsule (125 mg total) by mouth 4 (four) times a day for 14 days, Starting Mon 6/11/2018, Until Mon 6/25/2018, Print         CONTINUE these medications which have NOT CHANGED    Details   acetaminophen (TYLENOL) 325 mg tablet Take 650 mg by mouth every 6 (six) hours as needed for mild pain, Historical Med      amLODIPine (NORVASC) 10 mg tablet Take by mouth, Historical Med      atenolol (TENORMIN) 50 mg tablet Take by mouth, Starting Wed 6/7/2017, Historical Med      cyanocobalamin (VITAMIN B-12) 500 mcg tablet Take 500 mcg by mouth daily, Historical Med      donepezil (ARICEPT) 5 mg tablet Take 5 mg by mouth daily at bedtime, Historical Med      loperamide (IMODIUM A-D) 2 MG tablet Take 2 mg by mouth 4 (four) times a day as needed for diarrhea, Historical Med      LORazepam (ATIVAN) 0 5 mg tablet Take 0 5 mg by mouth every 8 (eight) hours as needed for anxiety, Historical Med      memantine (NAMENDA) 5 mg tablet Take 5 mg by mouth 2 (two) times a day, Historical Med      mupirocin (BACTROBAN) 2 % ointment Apply topically daily, Historical Med      pantoprazole (PROTONIX) 40 mg tablet Take 1 tablet (40 mg total) by mouth 2 (two) times a day before meals, Starting Wed 5/30/2018, Print      QUEtiapine (SEROquel) 25 mg tablet Take by mouth, Historical Med      saccharomyces boulardii (FLORASTOR) 250 mg capsule Take 1 capsule (250 mg total) by mouth 2 (two) times a day, Starting Wed 5/30/2018, Print      sucralfate (CARAFATE) 1 g/10 mL suspension Take 10 mL (1,000 mg total) by mouth every 6 (six) hours, Starting Wed 5/30/2018, Print      valsartan (DIOVAN) 320 MG tablet Take by mouth, Historical Med           No discharge procedures on file      ED Provider  Electronically Signed by           Megan Anderson DO  06/14/18 5589

## 2018-06-14 NOTE — ED NOTES
Patient transported to [address] [place] [address], [address] [place] [address]  06/14/18 1153

## 2018-06-15 LAB
ANION GAP SERPL CALCULATED.3IONS-SCNC: 6 MMOL/L (ref 4–13)
BASOPHILS # BLD AUTO: 0.04 THOUSANDS/ΜL (ref 0–0.1)
BASOPHILS NFR BLD AUTO: 1 % (ref 0–1)
BUN SERPL-MCNC: 11 MG/DL (ref 5–25)
CALCIUM SERPL-MCNC: 8.6 MG/DL (ref 8.3–10.1)
CHLORIDE SERPL-SCNC: 106 MMOL/L (ref 100–108)
CO2 SERPL-SCNC: 29 MMOL/L (ref 21–32)
CREAT SERPL-MCNC: 0.99 MG/DL (ref 0.6–1.3)
EOSINOPHIL # BLD AUTO: 0.06 THOUSAND/ΜL (ref 0–0.61)
EOSINOPHIL NFR BLD AUTO: 1 % (ref 0–6)
ERYTHROCYTE [DISTWIDTH] IN BLOOD BY AUTOMATED COUNT: 14.5 % (ref 11.6–15.1)
GFR SERPL CREATININE-BSD FRML MDRD: 50 ML/MIN/1.73SQ M
GLUCOSE SERPL-MCNC: 102 MG/DL (ref 65–140)
HCT VFR BLD AUTO: 34.6 % (ref 34.8–46.1)
HGB BLD-MCNC: 11.5 G/DL (ref 11.5–15.4)
IMM GRANULOCYTES # BLD AUTO: 0.06 THOUSAND/UL (ref 0–0.2)
IMM GRANULOCYTES NFR BLD AUTO: 1 % (ref 0–2)
LYMPHOCYTES # BLD AUTO: 1.16 THOUSANDS/ΜL (ref 0.6–4.47)
LYMPHOCYTES NFR BLD AUTO: 18 % (ref 14–44)
MAGNESIUM SERPL-MCNC: 1.9 MG/DL (ref 1.6–2.6)
MCH RBC QN AUTO: 28.5 PG (ref 26.8–34.3)
MCHC RBC AUTO-ENTMCNC: 33.2 G/DL (ref 31.4–37.4)
MCV RBC AUTO: 86 FL (ref 82–98)
MONOCYTES # BLD AUTO: 0.67 THOUSAND/ΜL (ref 0.17–1.22)
MONOCYTES NFR BLD AUTO: 10 % (ref 4–12)
NEUTROPHILS # BLD AUTO: 4.49 THOUSANDS/ΜL (ref 1.85–7.62)
NEUTS SEG NFR BLD AUTO: 69 % (ref 43–75)
NRBC BLD AUTO-RTO: 0 /100 WBCS
PHOSPHATE SERPL-MCNC: 2.9 MG/DL (ref 2.3–4.1)
PLATELET # BLD AUTO: 241 THOUSANDS/UL (ref 149–390)
PMV BLD AUTO: 10.2 FL (ref 8.9–12.7)
POTASSIUM SERPL-SCNC: 3.4 MMOL/L (ref 3.5–5.3)
RBC # BLD AUTO: 4.03 MILLION/UL (ref 3.81–5.12)
SODIUM SERPL-SCNC: 141 MMOL/L (ref 136–145)
WBC # BLD AUTO: 6.48 THOUSAND/UL (ref 4.31–10.16)

## 2018-06-15 PROCEDURE — 80048 BASIC METABOLIC PNL TOTAL CA: CPT | Performed by: NURSE PRACTITIONER

## 2018-06-15 PROCEDURE — G8987 SELF CARE CURRENT STATUS: HCPCS

## 2018-06-15 PROCEDURE — 85025 COMPLETE CBC W/AUTO DIFF WBC: CPT | Performed by: NURSE PRACTITIONER

## 2018-06-15 PROCEDURE — 83735 ASSAY OF MAGNESIUM: CPT | Performed by: NURSE PRACTITIONER

## 2018-06-15 PROCEDURE — G8978 MOBILITY CURRENT STATUS: HCPCS

## 2018-06-15 PROCEDURE — 84100 ASSAY OF PHOSPHORUS: CPT | Performed by: NURSE PRACTITIONER

## 2018-06-15 PROCEDURE — G8979 MOBILITY GOAL STATUS: HCPCS

## 2018-06-15 PROCEDURE — 97163 PT EVAL HIGH COMPLEX 45 MIN: CPT

## 2018-06-15 PROCEDURE — G8988 SELF CARE GOAL STATUS: HCPCS

## 2018-06-15 PROCEDURE — 99232 SBSQ HOSP IP/OBS MODERATE 35: CPT | Performed by: FAMILY MEDICINE

## 2018-06-15 PROCEDURE — 97167 OT EVAL HIGH COMPLEX 60 MIN: CPT

## 2018-06-15 RX ORDER — HEPARIN SODIUM 1000 [USP'U]/ML
3000 INJECTION, SOLUTION INTRAVENOUS; SUBCUTANEOUS ONCE
Status: COMPLETED | OUTPATIENT
Start: 2018-06-15 | End: 2018-06-16

## 2018-06-15 RX ORDER — HEPARIN SODIUM 10000 [USP'U]/100ML
3-20 INJECTION, SOLUTION INTRAVENOUS
Status: DISCONTINUED | OUTPATIENT
Start: 2018-06-15 | End: 2018-06-16

## 2018-06-15 RX ADMIN — ATENOLOL 50 MG: 50 TABLET ORAL at 08:44

## 2018-06-15 RX ADMIN — SUCRALFATE 1000 MG: 1 SUSPENSION ORAL at 11:37

## 2018-06-15 RX ADMIN — Medication 250 MG: at 17:13

## 2018-06-15 RX ADMIN — SUCRALFATE 1000 MG: 1 SUSPENSION ORAL at 06:09

## 2018-06-15 RX ADMIN — ENOXAPARIN SODIUM 30 MG: 30 INJECTION SUBCUTANEOUS at 08:44

## 2018-06-15 RX ADMIN — QUETIAPINE FUMARATE 25 MG: 25 TABLET ORAL at 21:08

## 2018-06-15 RX ADMIN — VALSARTAN 320 MG: 160 TABLET, FILM COATED ORAL at 08:44

## 2018-06-15 RX ADMIN — MEMANTINE HYDROCHLORIDE 5 MG: 5 TABLET ORAL at 17:12

## 2018-06-15 RX ADMIN — SUCRALFATE 1000 MG: 1 SUSPENSION ORAL at 17:13

## 2018-06-15 RX ADMIN — LORAZEPAM 0.5 MG: 0.5 TABLET ORAL at 17:11

## 2018-06-15 RX ADMIN — CYANOCOBALAMIN TAB 500 MCG 500 MCG: 500 TAB at 08:44

## 2018-06-15 RX ADMIN — ACETAMINOPHEN 650 MG: 325 TABLET, FILM COATED ORAL at 17:12

## 2018-06-15 RX ADMIN — PANTOPRAZOLE SODIUM 40 MG: 40 TABLET, DELAYED RELEASE ORAL at 17:12

## 2018-06-15 RX ADMIN — DONEPEZIL HYDROCHLORIDE 5 MG: 5 TABLET ORAL at 21:08

## 2018-06-15 RX ADMIN — PANTOPRAZOLE SODIUM 40 MG: 40 TABLET, DELAYED RELEASE ORAL at 06:09

## 2018-06-15 RX ADMIN — VANCOMYCIN HYDROCHLORIDE 125 MG: 5 INJECTION, POWDER, LYOPHILIZED, FOR SOLUTION INTRAVENOUS at 17:15

## 2018-06-15 RX ADMIN — VANCOMYCIN HYDROCHLORIDE 125 MG: 5 INJECTION, POWDER, LYOPHILIZED, FOR SOLUTION INTRAVENOUS at 08:44

## 2018-06-15 RX ADMIN — AMLODIPINE BESYLATE 10 MG: 10 TABLET ORAL at 08:44

## 2018-06-15 RX ADMIN — MEMANTINE HYDROCHLORIDE 5 MG: 5 TABLET ORAL at 08:44

## 2018-06-15 RX ADMIN — Medication 250 MG: at 08:44

## 2018-06-15 RX ADMIN — VANCOMYCIN HYDROCHLORIDE 125 MG: 5 INJECTION, POWDER, LYOPHILIZED, FOR SOLUTION INTRAVENOUS at 11:36

## 2018-06-15 NOTE — OCCUPATIONAL THERAPY NOTE
Occupational Therapy Evaluation         Patient Name: Rita Wong  [id number] Date: 6/15/2018     06/15/18 6138   Note Type   Note type Eval only   Restrictions/Precautions   Weight Bearing Precautions Per Order No   Other Precautions 1:1;Chair Alarm; Bed Alarm;Cognitive; Fall Risk;Impulsive;Multiple lines;Contact/isolation  (arrington catheter; (+) C diff)   Pain Assessment   Pain Assessment FLACC   Pain Rating: FLACC (Rest) - Face 0   Pain Rating: FLACC (Rest) - Legs 0   Pain Rating: FLACC (Rest) - Activity 0   Pain Rating: FLACC (Rest) - Cry 0   Pain Rating: FLACC (Rest) - Consolability 0   Score: FLACC (Rest) 0   Pain Rating: FLACC (Activity) - Face 1   Pain Rating: FLACC (Activity) - Legs 0   Pain Rating: FLACC (Activity) - Activity 0   Pain Rating: FLACC (Activity) - Cry 1   Pain Rating: FLACC (Activity) - Consolability 0   Score: FLACC (Activity) 2   Home Living   Type of Home Assisted living  [facility] per chart)   Home Layout Performs ADLs on one level; Able to live on main level with bedroom/bathroom   [address] Other (Comment)  (unknown- pt reports never using RW)   Prior Function   Level of [place] Needs assistance with ADLs and functional mobility; Needs assistance with IADLs   Lives With Facility staff   ADL Assistance Needs assistance   IADLs Needs assistance   Falls in the last 6 months (chart reporting (+) falls)   Lifestyle   Autonomy Per chart review, patient requires assistance for ADLs/IADLs, lives in assisted living facility [facility]     ADL   Eating Assistance 3  Moderate Assistance   Grooming Assistance 2  Maximal Assistance   UB Bathing Assistance 2  Maximal Assistance   LB Bathing Assistance 1  Total Assistance   UB Dressing Assistance 2  Maximal Assistance   LB Dressing Assistance 1  Total [address]  2  Maximal Assistance   Functional Assistance 2  Maximal Assistance   Bed Mobility   Rolling L 2  Maximal assistance Additional items Assist x 1;HOB elevated; Bedrails; Increased time required;Verbal cues   Supine to Sit 2  Maximal assistance   Additional items Assist x 1;HOB elevated; Bedrails; Increased time required;Verbal cues   Transfers   Sit to Stand 2  Maximal assistance   Additional items Assist x 1; Armrests; Increased time required; Impulsive;Verbal cues   Stand to Sit 2  Maximal assistance   Additional items Assist x 1; Armrests; Verbal cues; Impulsive   Toilet transfer 2  Maximal assistance   Additional items Assist x 1; Increased time required;Standard toilet;Verbal cues  (+ CGA of another)   Functional Mobility   Functional Mobility 2  Maximal assistance   Additional Comments assist x2   Additional items Rolling walker   Balance   Static Sitting Fair   Dynamic Sitting Fair -   Static Standing Poor +   Dynamic Standing Poor   Activity Tolerance   Activity Tolerance Patient limited by fatigue;Treatment limited secondary to medical complications (Comment)  (baseline dementia)   Medical Staff Made Aware FRANCE Reddy made aware of outcomes/recs    Nurse Made Aware DAYANARA Clark verbalized pt appropriate for therapy, made aware of outcomes    RUE Assessment   RUE Assessment WFL  (3+/5)   LUE Assessment   LUE Assessment WFL  (3+/5)   Hand Function   Gross Motor Coordination Impaired   Fine Motor Coordination Impaired   Sensation   Light Touch No apparent deficits  (BUEs)   Cognition   Overall Cognitive Status Impaired   Arousal/Participation Alert   Attention Difficulty attending to directions   Orientation Level Oriented to person;Disoriented to place; Disoriented to time;Disoriented to situation   Memory Decreased recall of biographical information;Decreased short term memory;Decreased recall of recent events;Decreased recall of precautions   Following Commands Follows one step commands with increased time or repetition   Assessment   Limitation Decreased ADL status; Decreased UE strength;Decreased Safe judgement during ADL;Decreased cognition;Decreased endurance;Decreased fine motor control;Decreased self-care trans;Decreased high-level ADLs   Prognosis Fair   Assessment Patient is 80 y o  female who was admitted to [address] [facility] [facility] secondary to [facility] on 6/14/2018  Patient is a poor historian secondary to baseline dementia  Per chart review, patient presented to ED s/p fall  CT of head showed chronic left MCA territory infarct but no acute  intracranial hemorrhage, mass effect or extra-axial collection  Pt recently to ED following fall, found to have compression fractures but family and pt were not interested in any interventions  SLIM attending verbalized pt appropriate for OOB mobility at this time  At this time, patient is also affected by the comorbidities of: A-fib, noncompliance with treatment, dementia with behavioral disturbance, multiple falls, [place], C-diff, anemia, HTN, CKD, and PUD  Additionally, patient is affected by the following personal factors: difficulty performing ADLs/IADLs , decreased initiation and engagement  and positive fall history   Orders placed for OT evaluation/treatment with up with assistance orders  Performed at least two patient identifiers during session including name and wristband  Prior to admission, Per chart review, patient requires assistance for ADLs/IADLs, lives in assisted living facility [facility]  Upon OT evaluation, patient requires maximum assist for UB ADLs and total assist for LB ADLs  Occupational performance is affected by the following deficits: impulsive behavior decreased strength, decreased endurance, decreased postural control/alignment, decreased gross motor control, decreased orientation , decreased memory , decreased sequencing , decreased fine motor control , decreased dynamic sitting/standing balance and poor standing tolerance   Based on the following information, patient would benefit from continued OT treatment while in the hospital to address the identified deficits and increase level of functional independence  Occupational performance areas to address include: bathing/shower, transfer to all surfaces, functional mobility, safety procedures, grooming, oral hygiene, toilet hygiene, dressing and feeding/eating  From OT standpoint, recommendation at time of d/c would be short term rehab  Goals   Patient Goals "to go back to camp"   Plan   Treatment Interventions ADL retraining;Functional transfer training;UE strengthening/ROM; Endurance training;Cognitive reorientation;Patient/family training;Fine motor coordination activities; Compensatory technique education;Continued evaluation; Energy conservation; Activityengagement   Goal Expiration Date 06/29/18   OT Frequency 3-5x/wk   Recommendation   OT Discharge Recommendation Short Term Rehab   OT - OK to Discharge Yes  (STR when medically cleared )   Barthel Index   Feeding 5   Bathing 0   Grooming Score 0   Dressing Score 0   Bladder Score 0   Bowels Score 5   Toilet Use Score 5   Transfers (Bed/Chair) Score 5   Mobility (Level Surface) Score 0   Stairs Score 0   Barthel Index Score 20   Modified Terri Scale   Modified Terri Scale 4     Occupational Therapy Goals:     1  Patient will follow 100% simple one step directives without verbal cues  2  Patient will increase standing tolerance time to 15 minutes with unilateral UE support to complete sink level ADLs at supervision assist    3  Patient will increase OOB/sitting tolerance to 2-4 hours per day for increase participation in self-care and leisure tasks with no s/s of exertion  4  Patient will transfer bed to chair/toilet at supervision assist with AD as indicated  5  Patient will complete LB ADLs with supervision assist with AD as indicated  6  Patient will complete UB ADLs with supervision assist    7  Patient will complete toileting hygiene with supervision assist for thoroughness

## 2018-06-15 NOTE — ASSESSMENT & PLAN NOTE
S/P multiple falls past few days  Multifactorial history of dementia, physical deconditioning  Infectious C diff  Fall and safety precaution  Bed alarm  PT/OT, case management for discharge planning

## 2018-06-15 NOTE — H&P
H&P- Adore Merino 3/28/1926, 80 y o  female MRN: [id number]    Unit/Bed#: ED 21 Encounter: [id number]    Primary Care Provider: No primary care provider on file  Date and time admitted to hospital: 6/14/2018  5:14 PM        * Falls   Assessment & Plan    S/P multiple falls past few days  Multifactorial history of dementia, physical deconditioning  Infectious C diff  Fall and safety precaution  Bed alarm  PT/OT, case management for discharge planning  Weakness   Assessment & Plan    S/P fall, likely secondary to physical deconditioning, muscle weakness, Dementia  Pt/OT/ Case management for discharge planning  Fall and safety precaution  Bed Alarm  C  difficile diarrhea   Assessment & Plan    Current treatment of Vanco, continue the same  Contact precaution  Monitor for electrolyte imbalance and replete as needed  Alzheimer's dementia with behavioral disturbance   Assessment & Plan    Monitor for safety   Re-orient patient  VTE Prophylaxis: Enoxaparin (Lovenox)  / sequential compression device   Code Status: DNR level 3  POLST: POLST form is not discussed and not completed at this time  Discussion with family: No family present at the bedside  Anticipated Length of Stay:  Patient will be admitted on an Inpatient basis with an anticipated length of stay of   2 midnights  Justification for Hospital Stay: Fall, Weakness    Total Time for Visit, including Counseling / Coordination of Care: 45 minutes  Greater than 50% of this total time spent on direct patient counseling and coordination of care  Chief Complaint:  Multiple Fall    History of Present Illness:    Adore Merino is a 80 y o  female Hx Alzheimer's dementia, AFib, hypertension, CAD, renal disorder of who present to ED from [address] [place] [address] s/p fall    As per     Review of Systems:    Review of Systems   Unable to perform ROS: Dementia       Past Medical and Surgical History:     Past Medical History:   Diagnosis Date    Alzheimer disease     Arterial occlusion     left leg    Atrial fibrillation ([address] )     Benign hypertension     Cardiac disease     Dementia     Hypertension     Renal disorder        Past Surgical History:   Procedure Laterality Date    ESOPHAGOGASTRODUODENOSCOPY N/A 5/29/2018    Procedure: ESOPHAGOGASTRODUODENOSCOPY (EGD);  pt has cdiff;  Surgeon: Myra Contreras MD;  Location: [facility]; Service: Gastroenterology       Meds/Allergies:    Prior to Admission medications    Medication Sig Start Date End Date Taking?  Authorizing Provider   acetaminophen (TYLENOL) 325 mg tablet Take 650 mg by mouth every 6 (six) hours as needed for mild pain    Historical Provider, MD   amLODIPine (NORVASC) 10 mg tablet Take by mouth    Historical Provider, MD   atenolol (TENORMIN) 50 mg tablet Take by mouth 6/7/17   Historical Provider, MD   cyanocobalamin (VITAMIN B-12) 500 mcg tablet Take 500 mcg by mouth daily    Historical Provider, MD   donepezil (ARICEPT) 5 mg tablet Take 5 mg by mouth daily at bedtime    Historical Provider, MD   loperamide (IMODIUM A-D) 2 MG tablet Take 2 mg by mouth 4 (four) times a day as needed for diarrhea    Historical Provider, MD   LORazepam (ATIVAN) 0 5 mg tablet Take 0 5 mg by mouth every 8 (eight) hours as needed for anxiety    Historical Provider, MD   memantine (NAMENDA) 5 mg tablet Take 5 mg by mouth 2 (two) times a day    Historical Provider, MD   mupirocin (BACTROBAN) 2 % ointment Apply topically daily    Historical Provider, MD   pantoprazole (PROTONIX) 40 mg tablet Take 1 tablet (40 mg total) by mouth 2 (two) times a day before meals 5/30/18   Zena Garcia MD   QUEtiapine (SEROquel) 25 mg tablet Take by mouth    Historical Provider, MD   saccharomyces boulardii (FLORASTOR) 250 mg capsule Take 1 capsule (250 mg total) by mouth 2 (two) times a day 5/30/18   Zena Garcia MD   sucralfate (CARAFATE) 1 g/10 mL suspension Take 10 mL (1,000 mg total) by mouth every 6 (six) hours 5/30/18   Jose Cabrera MD   valsartan (DIOVAN) 320 MG tablet Take by mouth    Historical Provider, MD   vancomycin (VANCOCIN) 125 MG capsule Take 1 capsule (125 mg total) by mouth 4 (four) times a day for 14 days 6/11/18 6/25/18  Juana Dickerson DO     I have reviewed home medications with patient personally  Allergies: No Known Allergies    Social History:     Marital Status:    Occupation: Retired  Patient Pre-hospital Living Situation:  Nursing home  Patient Pre-hospital Level of Mobility:  Gait weakness, usually walker  Patient Pre-hospital Diet Restrictions:  None  Substance Use History:   History   Alcohol Use No     History   Smoking Status    Never Smoker   Smokeless Tobacco    Never Used     History   Drug Use No       Family History:    non-contributory    Physical Exam:     Vitals:   Blood Pressure: 135/91 (06/14/18 1719)  Pulse: 92 (06/14/18 1719)  Temperature: 97 7 °F (36 5 °C) (06/14/18 1719)  Temp Source: Oral (06/14/18 1719)  Respirations: 20 (06/14/18 1719)  Weight - Scale: 54 3 kg (119 lb 11 4 oz) (06/14/18 1719)  SpO2: 96 % (06/14/18 1719)    Physical Exam   Constitutional: She appears well-developed and well-nourished  HENT:   Head: Normocephalic and atraumatic  Neck: Normal range of motion  Neck supple  Cardiovascular: Normal rate, regular rhythm and normal heart sounds  Pulmonary/Chest: Effort normal  No accessory muscle usage  No respiratory distress  Abdominal: Soft  Bowel sounds are normal  She exhibits no distension  There is no tenderness  Musculoskeletal: Normal range of motion  Neurological: She is alert  She is disoriented  No cranial nerve deficit  GCS eye subscore is 4  GCS verbal subscore is 5  GCS motor subscore is 6  Skin: Skin is warm and dry  Psychiatric: She has a normal mood and affect  She is agitated, aggressive, hyperactive and combative  Cognition and memory are impaired     Nursing note and vitals reviewed  Additional Data:     Lab Results: I have personally reviewed pertinent reports  Results from last 7 days  Lab Units 06/14/18  1940 06/11/18  0135   WBC Thousand/uL 8 97 13 42*   HEMOGLOBIN g/dL 11 5 11 4*   HEMATOCRIT % 34 6* 36 1   PLATELETS Thousands/uL 277 218   NEUTROS PCT %  --  87*   LYMPHS PCT %  --  5*   LYMPHO PCT % 6*  --    MONOS PCT %  --  6   MONO PCT MAN % 2*  --    EOS PCT %  --  0   EOSINO PCT MANUAL % 0  --        Results from last 7 days  Lab Units 06/14/18  1939 06/11/18  0135   SODIUM mmol/L 138  < > 140   POTASSIUM mmol/L 3 7  < > 3 9   CHLORIDE mmol/L 102  < > 105   CO2 mmol/L 23  < > 29   BUN mg/dL 14  < > 20   CREATININE mg/dL 1 12  < > 1 41*   CALCIUM mg/dL 8 4  < > 8 0*   TOTAL PROTEIN g/dL  --   --  6 2*   BILIRUBIN TOTAL mg/dL  --   --  0 60   ALK PHOS U/L  --   --  68   ALT U/L  --   --  12   AST U/L  --   --  16   GLUCOSE RANDOM mg/dL 193*  < > 127   < > = values in this interval not displayed  Imaging: I have personally reviewed pertinent reports  CT head without contrast   Final Result by Jolynn Gowers, MD (06/14 1924)         1  No acute intracranial hemorrhage, mass effect or extra-axial collection  2   Chronic left MCA territory infarct  Microangiopathic disease  Workstation performed: [id number]             EKG, Pathology, and Other Studies Reviewed on Admission:   · EKG: None    Allscripts / Epic Records Reviewed: Yes     ** Please Note: This note has been constructed using a voice recognition system   **

## 2018-06-15 NOTE — ASSESSMENT & PLAN NOTE
Most likely secondary to platelet dysfunction  Progression of dementia    Continue for a safety precaution  Follow PT OT recommendation

## 2018-06-15 NOTE — CASE MANAGEMENT
Initial Clinical Review    Admission: Date/Time/Statement: 6/14/18 @ 2036     Orders Placed This Encounter   Procedures    Inpatient Admission (expected length of stay for this patient is greater than two midnights)     Standing Status:   Standing     Number of Occurrences:   1     Order Specific Question:   Admitting Physician     Answer:   Ratna Robb [[id number]]     Order Specific Question:   Level of Care     Answer:   Med Surg [16]     Order Specific Question:   Estimated length of stay     Answer:   More than 2 Midnights     Order Specific Question:   Certification     Answer:   I certify that inpatient services are medically necessary for this patient for a duration of greater than two midnights  See H&P and MD Progress Notes for additional information about the patient's course of treatment  ED: Date/Time/Mode of Arrival:   ED Arrival Information     Expected Arrival Acuity Means of Arrival Escorted By Service Admission Type    - 6/14/2018 17:14 Urgent Ambulance [facility] General Medicine Urgent    Arrival Complaint    WEAKNESS          Chief Complaint:   Chief Complaint   Patient presents with    Fall     Per EMS, [facility] stated they found patient on her knees and she could not stand up  Pt was recently discharged from ED yesterday  Pt has no complaints at this time       History of Illness: 80 y o  female sent in from residential facility by EMS after she was found on her knees by the bed after presumed fall  Patient was seen yesterday in this ER after a fall as well and discharged back to that facility  Patient also actively being treated for C difficile per medical records  Remainder of HPI limited as patient is demented and has poor recall and appears unaware of why she is here in the emergency department    Patient complains of a headache, but denies any other pain        ED Vital Signs:   ED Triage Vitals [06/14/18 1719]   Temperature Pulse Respirations Blood Pressure SpO2   97 7 °F (36 5 °C) 92 20 135/91 96 %      Temp Source Heart Rate Source Patient Position - Orthostatic VS BP Location FiO2 (%)   Oral Monitor Lying Right arm --      Pain Score       No Pain        Wt Readings from Last 1 Encounters:   06/14/18 54 3 kg (119 lb 11 4 oz)       Vital Signs (abnormal):   Date and Time Temp Pulse SpO2 Resp BP   06/14/18 2047 --  124 94 % 20 167/86     Abnormal Labs/Diagnostic Test Results: Glucose 193, K 3 1    UA w Reflex to Microscopic w Reflex to Culture [[id number]] (Abnormal)   Lab Status: Final result   06/14/2018 Specimen: Urine from Urine, Indwelling Pugh Catheter    Color, UA   yellow    Clarity, UA  clear    Specific [place], UA 1 015 1 003 - 1 030    pH, UA 6 0 4 5 - 8 0    Leukocytes, UA Negative Negative    Nitrite, UA Negative Negative    Protein, UA Trace (A) Negative mg/dl    Glucose,  (1/10%) (A) Negative mg/dl    Ketones, UA Negative Negative mg/dl    Urobilinogen, UA 0 2 0 2, 1 0 E U /dl E U /dl    Bilirubin, UA Negative Negative    Blood, UA Small (A) Negative   Urine Microscopic [[id number]] (Abnormal)   Lab Status: Final result Specimen: Urine from Urine, Indwelling Pugh Catheter    RBC, UA 0-1 (A) None Seen, 0-5 /hpf    WBC, UA None Seen None Seen, 0-5, 5-55, 5-65 /hpf    Epithelial Cells None Seen None Seen, Occasional /hpf    Bacteria, UA Occasional None Seen, Occasional /hpf     CT of Head: 1   No acute intracranial hemorrhage, mass effect or extra-axial collection  2   Chronic left MCA territory infarct   Microangiopathic disease     ED Treatment:   Medication Administration from 06/14/2018 1714 to 06/14/2018 2104     None        Physical Exam   Constitutional: She is oriented to person, place, and time  She appears well-developed and well-nourished  No distress  Cardiovascular: Normal rate, regular rhythm, normal heart sounds and intact distal pulses  Pulmonary/Chest: Effort normal and breath sounds normal  No respiratory distress  She has no wheezes   She has no rales  Musculoskeletal: Normal range of motion  She exhibits no edema or deformity  No tenderness palpation along the limbs, full active and passive range of motion  Patient ambulating without difficulty  Neurological: She is alert and oriented to person, place, and time  Skin: Skin is warm and dry  No rash noted  No erythema  Small bruising to bilateral anterior knee,    Past Medical/Surgical History: Active Ambulatory Problems     Diagnosis Date Noted    Atrial fibrillation ([facility] [address] [address] ) 06/29/2017    Noncompliance with treatment 06/29/2017    Alzheimer's dementia with behavioral disturbance 06/29/2017    Falls 06/29/2017    Physical deconditioning 06/29/2017    [place] (hard of hearing) 06/29/2017    Melena 05/27/2018    C  difficile diarrhea 05/28/2018    Anemia 05/27/2018    GI bleed 05/27/2018    Hypertension 05/29/2018    CKD (chronic kidney disease) stage 3, GFR 30-59 ml/min 05/29/2018    GERD with esophagitis 06/05/2018    PUD (peptic ulcer disease) 06/05/2018     Resolved Ambulatory Problems     Diagnosis Date Noted    Arterial occlusion 06/29/2017    Accelerated hypertension 06/29/2017    TEDDY (acute kidney injury) ([facility] ) 06/29/2017    Critical lower limb ischemia 06/29/2017    Delirium 06/29/2017     Past Medical History:   Diagnosis Date    Alzheimer disease     Arterial occlusion     [facility]     Benign hypertension     Cardiac disease     Dementia     Hypertension     Renal disorder        Admitting Diagnosis: Weakness [R53 1]  Hyperglycemia [R73 9]  Dementia [F03 90]    Age/Sex: 80 y o  female    Assessment/Plan:     * Falls   Assessment & Plan     S/P multiple falls past few days  Multifactorial history of dementia, physical deconditioning  Infectious C diff  Fall and safety precaution  Bed alarm    PT/OT, case management for discharge planning           Weakness   Assessment & Plan     S/P fall, likely secondary to physical deconditioning, muscle weakness, Dementia  Pt/OT/ Case management for discharge planning  Fall and safety precaution  Bed Alarm              C  difficile diarrhea   Assessment & Plan     Current treatment of Vanco, continue the same  Contact precaution  Monitor for electrolyte imbalance and replete as needed                Alzheimer's dementia with behavioral disturbance   Assessment & Plan     Monitor for safety   Re-orient patient                  VTE Prophylaxis: Enoxaparin (Lovenox)  / sequential compression device   Code Status: DNR level 3  POLST: POLST form is not discussed and not completed at this time  Discussion with family: No family present at the bedside       Anticipated Length of Stay:  Patient will be admitted on an Inpatient basis with an anticipated length of stay of   2 midnights     Justification for Hospital Stay: Fall, Weakness      Admission Orders:  Inpatient/MedSurg  Bilateral Sequential Compression Device  OT/PT Consult   Urine Culture Pending    Scheduled Meds:   Current Facility-Administered Medications:  amLODIPine 10 mg Oral Daily   atenolol 50 mg Oral Daily   cyanocobalamin 500 mcg Oral Daily   donepezil 5 mg Oral HS   enoxaparin 30 mg Subcutaneous Daily   memantine 5 mg Oral BID   pantoprazole 40 mg Oral BID AC   QUEtiapine 25 mg Oral HS   saccharomyces boulardii 250 mg Oral BID   sucralfate 1,000 mg Oral Q6H [address]   valsartan 320 mg Oral Daily   vancomycin 125 mg Oral 4x Daily     Ativan 0 5 mg po x 1 thus far

## 2018-06-15 NOTE — PLAN OF CARE
DISCHARGE PLANNING     Discharge to home or other facility with appropriate resources Progressing        GASTROINTESTINAL - ADULT     Maintains or returns to baseline bowel function Progressing     Maintains adequate nutritional intake Progressing        GENITOURINARY - ADULT     Maintains or returns to baseline urinary function Progressing     Absence of urinary retention Progressing     Urinary catheter remains patent Progressing        INFECTION - ADULT     Absence or prevention of progression during hospitalization Progressing     Absence of fever/infection during neutropenic period Progressing        Knowledge Deficit     Patient/family/caregiver demonstrates understanding of disease process, treatment plan, medications, and discharge instructions Progressing        PAIN - ADULT     Verbalizes/displays adequate comfort level or baseline comfort level Progressing        Potential for Falls     Patient will remain free of falls Progressing        Prexisting or High Potential for Compromised Skin Integrity     Skin integrity is maintained or improved Progressing        SAFETY ADULT     Maintain or return to baseline ADL function Progressing     Maintain or return mobility status to optimal level Progressing

## 2018-06-15 NOTE — ASSESSMENT & PLAN NOTE
Current treatment of Vanco, continue the same  Contact precaution  Monitor for electrolyte imbalance and replete as needed

## 2018-06-15 NOTE — ASSESSMENT & PLAN NOTE
S/P fall, likely secondary to physical deconditioning, muscle weakness, Dementia  Pt/OT/ Case management for discharge planning  Fall and safety precaution  Bed Alarm

## 2018-06-15 NOTE — ED NOTES
Dr Brown [facility] notified of distended abdomen  Bladder scanned pt for >573ML urine  Provider stated to place nury Chin, DAYANARA  06/14/18 2020

## 2018-06-15 NOTE — PLAN OF CARE
Problem: PHYSICAL THERAPY ADULT  Goal: Performs mobility at highest level of function for planned discharge setting  See evaluation for individualized goals  Treatment/Interventions: Functional transfer training, LE strengthening/ROM, Therapeutic exercise, Endurance training, Cognitive reorientation, Patient/family training, Equipment eval/education, Bed mobility, Gait training, Continued evaluation, Spoke to MD, Spoke to nursing, Spoke to case management, OT  Equipment Recommended: Vanesa Jack       See flowsheet documentation for full assessment, interventions and recommendations  Prognosis: Fair  Problem List: Decreased strength, Decreased endurance, Impaired balance, Decreased mobility, Decreased cognition, Decreased safety awareness  Assessment: Pt is 80 y o  female seen for PT evaluation on 6/15/2018 s/p admit to [facility] on 6/14/2018 w/ Falls  PT consulted to assess pt's functional mobility and d/c needs  Order placed for PT eval and tx, w/ up w/ A order  Performed at least 2 patient identifiers during session: Name and wristband  Comorbidities affecting pt's physical performance at time of assessment include: h/o multiple falls per chart, weakness, C diff, Alzheimer's dementia w/ behavioral disturbance, AFib  Pt recently to ED following fall, found to have compression fractures but family and pt were not interested in any interventions  SLIM attending verbalized pt appropriate for OOB mobility at this time  PTA, pt was resident of [facility] and PLOF unknown as pt is poor historian  Personal factors affecting pt at time of IE include: ambulating w/ assistive device, inability to navigate level surfaces w/o external assistance, positive fall history, decreased initiation and engagement, limited insight into impairments, inability to perform IADLs, inability to perform ADLs and inability to live alone   Please find objective findings from PT assessment regarding body systems outlined above with impairments and limitations including weakness, impaired balance, decreased endurance, gait deviations, decreased activity tolerance, decreased functional mobility tolerance, decreased safety awareness, fall risk and decreased cognition, as well as mobility assessment (need for cueing for mobility technique)  The following objective measures performed on IE also reveal limitations: Barthel Index: 20/100 and Modified Kerr: 4 (moderate/severe disability)  Pt's clinical presentation is currently unstable/unpredictable seen in pt's presentation of advanced dementia with limited command following  Pt to benefit from continued PT tx to address deficits as defined above and maximize level of functional independent mobility and consistency  From PT/mobility standpoint, recommendation at time of d/c would be STR pending progress in order to facilitate return to PLOF  Barriers to Discharge: Other (Comment) (pt resident at [facility]- unknown how much physical A pt receiving)     Recommendation: Short-term skilled PT     PT - OK to Discharge: Yes (when medically cleared, if to STR)    See flowsheet documentation for full assessment

## 2018-06-15 NOTE — PHYSICIAN ADVISOR
Current patient class: Inpatient  The patient is currently on Hospital Day: 2 at [address]        The patient was admitted to the hospital  on 6/14/18 at 2036 for the following diagnosis:  Weakness [R53 1]  Hyperglycemia [R73 9]  Dementia [F03 90]       There is documentation in the medical record of an expected length of stay of at least 2 midnights  The patient is therefore expected to satisfy the 2 midnight benchmark and given the 2 midnight presumption is appropriate for INPATIENT ADMISSION  Given this expectation of a satisfying stay, CMS instructs us that the patient is most often appropriate for inpatient admission under part A provided medical necessity is documented in the chart  After review of the relevant documentation, labs, vital signs and test results, the patient is appropriate for INPATIENT ADMISSION  Admission to the hospital as an inpatient is a complex decision making process which requires the practitioner to consider the patients presenting complaint, history and physical examination and all relevant testing  With this in mind, in this case, the patient was deemed appropriate for INPATIENT ADMISSION  After review of the documentation and testing available at the time of the admission I concur with this clinical determination of medical necessity  The patient does have an inpatient admission within the previous 30 days  The patient was admitted on 5/27/18 and discharged on 5/301/8 as an inpatient  The patient therefore required readmission review  In this case the patient should be considered a SEPARATE and UNRELATED INPATIENT ADMISSION  The patient had been discharged in stable condition with a completed care plan  There were no unresolved acute medical issues at the time of discharged which would have reasonably been expected to prompt this readmission  Rationale is as follows:     The patient is a 80 yrs OLD  Female who presented to the ED at 6/14/2018  5:14 PM with a chief complaint of Fall (Per EMS, [facility] stated they found patient on her knees and she could not stand up  Pt was recently discharged from ED yesterday  Pt has no complaints at this time)   Patient has had multiple falls in the past few days - PT/OT has been requested for physical deconditioning and weakness  She was admitted from 5/27/18-5/30/18 for melena -seen in consultation by GI service -underwent EGD which showed erosion in the body of stomach   Started on flagyl for C diff   She was discharged to NH in stable condition   Current admission is therefore separate and unrelated   The patients vitals on arrival were ED Triage Vitals [06/14/18 1719]   Temperature Pulse Respirations Blood Pressure SpO2   97 7 °F (36 5 °C) 92 20 135/91 96 %      Temp Source Heart Rate Source Patient Position - Orthostatic VS BP Location FiO2 (%)   Oral Monitor Lying Right arm --      Pain Score       No Pain           Past Medical History:   Diagnosis Date    Alzheimer disease     Arterial occlusion     left leg    Atrial fibrillation (HCC)     Benign hypertension     Cardiac disease     Dementia     Hypertension     Renal disorder      Past Surgical History:   Procedure Laterality Date    ESOPHAGOGASTRODUODENOSCOPY N/A 5/29/2018    Procedure: ESOPHAGOGASTRODUODENOSCOPY (EGD);  pt has cdiff;  Surgeon: Ashanti Pina MD;  Location: [facility];   Service: Gastroenterology           Consults have been placed to:   IP CONSULT TO CASE MANAGEMENT    Vitals:    06/14/18 2047 06/14/18 2300 06/14/18 2314 06/15/18 0700   BP: 167/86 164/86  158/80   BP Location: Right arm Right arm  Left arm   Pulse: (!) 124 94  88   Resp: 20 18 18   Temp:  97 8 °F (36 6 °C)  98 °F (36 7 °C)   TempSrc:  Oral  Oral   SpO2: 94% 96%     Weight:       Height:   5' 4" (1 626 m)        Most recent labs:    Recent Labs      06/15/18   0511   WBC  6 48   HGB  11 5   HCT  34 6*   PLT  241   K  3 4*   NA  141 CALCIUM  8 6   BUN  11   CREATININE  0 99       Scheduled Meds:  Current Facility-Administered Medications:  acetaminophen 650 mg Oral Q6H PRN SEA Stringer   amLODIPine 10 mg Oral Daily SEA Stringer   atenolol 50 mg Oral Daily SEA Stringer   cyanocobalamin 500 mcg Oral Daily SEA Stringer   donepezil 5 mg Oral HS SEA Stringer   enoxaparin 30 mg Subcutaneous Daily SEA Stringer   loperamide 2 mg Oral 4x Daily PRN SEA Stringer   LORazepam 0 5 mg Oral Q8H PRN SEA Stringer   memantine 5 mg Oral BID SEA Stringer   pantoprazole 40 mg Oral BID AC SEA Stringer   QUEtiapine 25 mg Oral HS SEA Stringer   saccharomyces boulardii 250 mg Oral BID SEA Stringer   sucralfate 1,000 mg Oral Q6H [facility] SEA Stringer   valsartan 320 mg Oral Daily SEA Stringer   vancomycin 125 mg Oral 4x Daily SEA Stringer     Continuous Infusions:   PRN Meds:   acetaminophen    loperamide    LORazepam    Surgical procedures (if appropriate):

## 2018-06-15 NOTE — PLAN OF CARE
Problem: OCCUPATIONAL THERAPY ADULT  Goal: Performs self-care activities at highest level of function for planned discharge setting  See evaluation for individualized goals  Treatment Interventions: ADL retraining, Functional transfer training, UE strengthening/ROM, Endurance training, Cognitive reorientation, Patient/family training, Fine motor coordination activities, Compensatory technique education, Continued evaluation, Energy conservation, Activityengagement          See flowsheet documentation for full assessment, interventions and recommendations  Limitation: Decreased ADL status, Decreased UE strength, Decreased Safe judgement during ADL, Decreased cognition, Decreased endurance, Decreased fine motor control, Decreased self-care trans, Decreased high-level ADLs  Prognosis: Fair  Assessment: Patient is 80 y o  female who was admitted to [address] [place] [address] [facility]  Patient is a poor historian secondary to baseline dementia  Per chart review, patient presented to ED s/p fall  CT of head showed chronic left MCA territory infarct but no acute  intracranial hemorrhage, mass effect or extra-axial collection  Pt recently to ED following fall, found to have compression fractures but family and pt were not interested in any interventions  SLIM attending verbalized pt appropriate for OOB mobility at this time  At this time, patient is also affected by the comorbidities of: A-fib, noncompliance with treatment, dementia with behavioral disturbance, multiple falls, [place], C-diff, anemia, HTN, CKD, and PUD  Additionally, patient is affected by the following personal factors: difficulty performing ADLs/IADLs , decreased initiation and engagement  and positive fall history   Orders placed for OT evaluation/treatment with up with assistance orders  Performed at least two patient identifiers during session including name and wristband   Prior to admission, Per chart review, patient requires assistance for ADLs/IADLs, lives in assisted living facility [facility]  Upon OT evaluation, patient requires maximum assist for UB ADLs and total assist for LB ADLs  Occupational performance is affected by the following deficits: impulsive behavior decreased strength, decreased endurance, decreased postural control/alignment, decreased gross motor control, decreased orientation , decreased memory , decreased sequencing , decreased fine motor control , decreased dynamic sitting/standing balance and poor standing tolerance  Based on the following information, patient would benefit from continued OT treatment while in the hospital to address the identified deficits and increase level of functional independence  Occupational performance areas to address include: bathing/shower, transfer to all surfaces, functional mobility, safety procedures, grooming, oral hygiene, toilet hygiene, dressing and feeding/eating  From OT standpoint, recommendation at time of d/c would be short term rehab       OT Discharge Recommendation: Short Term Rehab  OT - OK to Discharge: Yes (STR when medically cleared )

## 2018-06-15 NOTE — PROGRESS NOTES
Progress Note Maikel Lazcano 3/28/1926, 80 y o  female MRN: [id number]    Unit/Bed#: -01 Encounter: [id number]    Primary Care Provider: No primary care provider on file  Date and time admitted to hospital: 2018  5:14 PM        * Falls   Assessment & Plan    Most likely secondary to platelet dysfunction  Progression of dementia  Continue for a safety precaution  Follow PT OT recommendation        Weakness   Assessment & Plan    Most likely secondary to platelet dysfunction  Progression of dementia  Continue for a safety precaution  Follow PT OT recommendation        C  difficile diarrhea   Assessment & Plan    Continue current medication        Alzheimer's dementia with behavioral disturbance   Assessment & Plan    No Acute encephalopathy  Continue close monitoring        Atrial fibrillation (HCC)   Assessment & Plan    No acute exacerbation  Continue close monitoring  Current medication          VTE Pharmacologic Prophylaxis:   Pharmacologic: Enoxaparin (Lovenox)  Mechanical VTE Prophylaxis in Place: No    Patient Centered Rounds: I have performed bedside rounds with nursing staff today  Discussions with Specialists or Other Care Team Provider:     Education and Discussions with Family / Patient:     Time Spent for Care: 20 minutes  More than 50% of total time spent on counseling and coordination of care as described above  Current Length of Stay: 1 day(s)    Current Patient Status: Inpatient   Certification Statement: The patient will continue to require additional inpatient hospital stay due to Acute above conditions    Discharge Plan: depend on placement    Code Status: Level 3 - DNAR and DNI      Subjective:   Patient baseline dementia, no oriented x3  Objective:     Vitals:   Temp (24hrs), Av 8 °F (36 6 °C), Min:97 7 °F (36 5 °C), Max:98 °F (36 7 °C)    HR:  [] 88  Resp:  [18-20] 18  BP: (135-167)/(80-91) 158/80  SpO2:  [94 %-96 %] 96 %  Body mass index is 20 55 kg/m²  Input and Output Summary (last 24 hours): Intake/Output Summary (Last 24 hours) at 06/15/18 0923  Last data filed at 06/15/18 0537   Gross per 24 hour   Intake              600 ml   Output             3050 ml   Net            -2450 ml       Physical Exam:     Physical Exam   Constitutional: No distress  Neck: Neck supple  No JVD present  No tracheal deviation present  No thyromegaly present  Cardiovascular: Normal rate, normal heart sounds and intact distal pulses  Exam reveals no gallop  No murmur heard  Pulmonary/Chest: Effort normal and breath sounds normal  No respiratory distress  She has no wheezes  She has no rales  She exhibits no tenderness  Abdominal: Soft  Bowel sounds are normal  She exhibits no distension and no mass  There is no tenderness  There is no rebound and no guarding  Lymphadenopathy:     She has no cervical adenopathy  Neurological: She is alert  She has normal reflexes  She displays normal reflexes  No cranial nerve deficit  She exhibits normal muscle tone  Coordination normal    No oriented x3   Skin: Skin is warm  She is not diaphoretic  Additional Data:     Labs:      Results from last 7 days  Lab Units 06/15/18  0511   WBC Thousand/uL 6 48   HEMOGLOBIN g/dL 11 5   HEMATOCRIT % 34 6*   PLATELETS Thousands/uL 241   NEUTROS PCT % 69   LYMPHS PCT % 18   MONOS PCT % 10   EOS PCT % 1       Results from last 7 days  Lab Units 06/15/18  0511  06/11/18  0135   SODIUM mmol/L 141  < > 140   POTASSIUM mmol/L 3 4*  < > 3 9   CHLORIDE mmol/L 106  < > 105   CO2 mmol/L 29  < > 29   BUN mg/dL 11  < > 20   CREATININE mg/dL 0 99  < > 1 41*   CALCIUM mg/dL 8 6  < > 8 0*   TOTAL PROTEIN g/dL  --   --  6 2*   BILIRUBIN TOTAL mg/dL  --   --  0 60   ALK PHOS U/L  --   --  68   ALT U/L  --   --  12   AST U/L  --   --  16   GLUCOSE RANDOM mg/dL 102  < > 127   < > = values in this interval not displayed  * I Have Reviewed All Lab Data Listed Above    * Additional Pertinent Lab Tests Reviewed: All Labs Within Last 24 Hours Reviewed    Imaging:    Imaging Reports Reviewed Today Include:   Imaging Personally Reviewed by Myself Include:      Recent Cultures (last 7 days):       Results from last 7 days  Lab Units 06/11/18  0638   C DIFF TOXIN B  POSITIVE for C difficle toxin by PCR  *       Last 24 Hours Medication List:     Current Facility-Administered Medications:  acetaminophen 650 mg Oral Q6H PRN Herluda Esparza, CRNP   amLODIPine 10 mg Oral Daily [facility] Isaias, CRNP   atenolol 50 mg Oral Daily [facility] Isaias, CRNP   cyanocobalamin 500 mcg Oral Daily [facility] Isaias, CRNP   donepezil 5 mg Oral HS [facility] Isaias, CRNP   enoxaparin 30 mg Subcutaneous Daily [facility] Isaias, CRNP   loperamide 2 mg Oral 4x Daily PRN [facility] Isaias, CRNP   LORazepam 0 5 mg Oral Q8H PRN [facility] Isaias, CRNP   memantine 5 mg Oral BID [facility] Isaias, CRNP   pantoprazole 40 mg Oral BID AC Herluda Esparza, CRNP   QUEtiapine 25 mg Oral HS [facility] Isaias, CRNP   saccharomyces boulardii 250 mg Oral BID [facility] Isaias, CRNP   sucralfate 1,000 mg Oral Q6H [facility] Isaias, SEA   valsartan 320 mg Oral Daily [facility] Isaias, CRNP   vancomycin 125 mg Oral 4x Daily HerSEA Gee        Today, Patient Was Seen By: Jewelene Gosselin, MD    ** Please Note: Dragon 360 Dictation voice to text software may have been used in the creation of this document   **

## 2018-06-15 NOTE — PHYSICAL THERAPY NOTE
Physical Therapy Evaluation     Patient's Name: Montez Waldron    Admitting Diagnosis  Weakness [R53 1]  Hyperglycemia [R73 9]  Dementia [F03 90]    Problem List  Patient Active Problem List   Diagnosis    Atrial fibrillation ([facility] )    Noncompliance with treatment    Alzheimer's dementia with behavioral disturbance    Falls    Physical deconditioning    [place] (hard of hearing)    Melena    C  difficile diarrhea    Anemia    GI bleed    Hypertension    CKD (chronic kidney disease) stage 3, GFR 30-59 ml/min    GERD with esophagitis    PUD (peptic ulcer disease)    Weakness       Past Medical History  Past Medical History:   Diagnosis Date    Alzheimer disease     Arterial occlusion     left leg    Atrial fibrillation ([facility] )     Benign hypertension     Cardiac disease     Dementia     Hypertension     Renal disorder        Past Surgical History  Past Surgical History:   Procedure Laterality Date    ESOPHAGOGASTRODUODENOSCOPY N/A 5/29/2018    Procedure: ESOPHAGOGASTRODUODENOSCOPY (EGD);  pt has cdiff;  Surgeon: Josie Dewitt MD;  Location: [facility]; Service: Gastroenterology      06/15/18 0931   Note Type   Note type Eval/Treat   Pain Assessment   Pain Assessment FLACC   Pain Score No Pain   Pain Rating: FLACC (Rest) - Face 0   Pain Rating: FLACC (Rest) - Legs 0   Pain Rating: FLACC (Rest) - Activity 0   Pain Rating: FLACC (Rest) - Cry 0   Pain Rating: FLACC (Rest) - Consolability 0   Score: FLACC (Rest) 0   Pain Rating: FLACC (Activity) - Face 1   Pain Rating: FLACC (Activity) - Legs 0   Pain Rating: FLACC (Activity) - Activity 0   Pain Rating: FLACC (Activity) - Cry 1   Pain Rating: FLACC (Activity) - Consolability 0   Score: FLACC (Activity) 2   Home Living   Type of Home Assisted living  [facility] per chart)   Home Layout Performs ADLs on one level; Able to live on main level with bedroom/bathroom   [address] (unknown- pt reports never using RW) Prior Function   Level of [place] Needs assistance with ADLs and functional mobility; Needs assistance with IADLs   Lives With Facility staff   ADL Assistance Needs assistance   IADLs Needs assistance   Falls in the last 6 months (chart reporting (+) falls)   Restrictions/Precautions   Weight Bearing Precautions Per Order No   Other Precautions 1:1;Chair Alarm; Bed Alarm;Cognitive; Fall Risk;Impulsive;Multiple lines;Contact/isolation  (arrington catheter; (+) C diff)   General   Family/Caregiver Present No   Cognition   Overall Cognitive Status Impaired   Arousal/Participation Alert   Orientation Level Oriented to person;Disoriented to place; Disoriented to time;Disoriented to situation   Memory Decreased recall of biographical information;Decreased short term memory;Decreased recall of recent events;Decreased recall of precautions   RUE Assessment   RUE Assessment (refer to OT eval for details)   LUE Assessment   LUE Assessment (refer to OT eval for details)   RLE Assessment   RLE Assessment (at least 3+/5 observed w/ mobility)   LLE Assessment   LLE Assessment (at least 3+/5 observed w/ mobility)   Coordination   Movements are Fluid and Coordinated 1   Sensation (unable to formal assess 2/2 cognitive status)   Bed Mobility   Rolling L 2  Maximal assistance   Additional items Assist x 1;HOB elevated; Bedrails; Increased time required;Verbal cues   Supine to Sit 2  Maximal assistance   Additional items Assist x 1;HOB elevated; Bedrails; Increased time required;Verbal cues   Transfers   Sit to Stand 2  Maximal assistance   Additional items Assist x 1; Armrests; Increased time required; Impulsive;Verbal cues   Stand to Sit 2  Maximal assistance   Additional items Assist x 1; Armrests; Verbal cues; Impulsive   Toilet transfer 2  Maximal assistance   Additional items Assist x 1; Increased time required;Standard toilet;Verbal cues  (+ CGA of another)   Ambulation/Elevation   Gait pattern Poor UE support;R Knee Robles;L Knee Robles; Forward Flexion;Decreased foot clearance;Shuffling; Inconsistent elma; Short stride; Step to;Excessively slow  (poor safety awareness)   Gait Assistance 2  Maximal assist  (mod-max Ax2 2/2 buckling)   Additional items Assist x 2;Verbal cues; Tactile cues   Assistive Device Rolling walker   Distance 15' x2   Balance   Static Sitting Fair   Dynamic Sitting Fair -   Static Standing Poor +   Dynamic Standing Poor   Ambulatory Poor   Endurance Deficit   Endurance Deficit Yes   Activity Tolerance   Activity Tolerance Patient limited by fatigue;Treatment limited secondary to medical complications (Comment)  (baseline dementia)   Medical Staff Made Aware pt w/ up w/ A order; prior to PT evaluation, PT discussed if pt is appropriate for therapy at this time w/ SLIM attending- verbalized pt ok for OOB mobility   Nurse Made Aware Yes, RN Kenny Kohli verbalized pt appropriate for PT session, made aware of out comes/recs   Assessment   Prognosis Fair   Problem List Decreased strength;Decreased endurance; Impaired balance;Decreased mobility; Decreased cognition;Decreased safety awareness   Assessment Pt is 80 y o  female seen for PT evaluation on 6/15/2018 s/p admit to [facility] on 6/14/2018 w/ Falls  PT consulted to assess pt's functional mobility and d/c needs  Order placed for PT eval and tx, w/ up w/ A order  Performed at least 2 patient identifiers during session: Name and wristband  Comorbidities affecting pt's physical performance at time of assessment include: h/o multiple falls per chart, weakness, C diff, Alzheimer's dementia w/ behavioral disturbance, AFib  Pt recently to ED following fall, found to have compression fractures but family and pt were not interested in any interventions  SLIM attending verbalized pt appropriate for OOB mobility at this time  PTA, pt was resident of [facility] and PLOF unknown as pt is poor historian   Personal factors affecting pt at time of IE include: ambulating w/ assistive device, inability to navigate level surfaces w/o external assistance, positive fall history, decreased initiation and engagement, limited insight into impairments, inability to perform IADLs, inability to perform ADLs and inability to live alone  Please find objective findings from PT assessment regarding body systems outlined above with impairments and limitations including weakness, impaired balance, decreased endurance, gait deviations, decreased activity tolerance, decreased functional mobility tolerance, decreased safety awareness, fall risk and decreased cognition, as well as mobility assessment (need for cueing for mobility technique)  The following objective measures performed on IE also reveal limitations: Barthel Index: 20/100 and Modified Roland: 4 (moderate/severe disability)  Pt's clinical presentation is currently unstable/unpredictable seen in pt's presentation of advanced dementia with limited command following  Pt to benefit from continued PT tx to address deficits as defined above and maximize level of functional independent mobility and consistency  From PT/mobility standpoint, recommendation at time of d/c would be STR pending progress in order to facilitate return to PLOF  Barriers to Discharge Other (Comment)  (pt resident at [facility]- unknown how much physical A pt receiving)   Goals   Patient Goals "to go back to camp"   [facility] Expiration Date 06/25/18   Short Term Goal #1 In 7-10 days: Increase bilateral LE strength 1/2 grade to facilitate independent mobility, Perform all bed mobility tasks with distant S to decrease caregiver burden, Perform all transfers with distant S to improve independence, Ambulate > 100 ft  with least restrictive assistive device with distant S w/o LOB and w/ normalized gait pattern 100% of the time and Increase all balance 1/2 grade to decrease risk for falls   Treatment Day 0   Plan   Treatment/Interventions Functional transfer training;LE strengthening/ROM; Therapeutic exercise; Endurance training;Cognitive reorientation;Patient/family training;Equipment eval/education; Bed mobility;Gait training;Continued evaluation;Spoke to MD;Spoke to nursing;Spoke to case management;OT   PT Frequency 5x/wk   Recommendation   Recommendation Short-term skilled PT   Equipment Recommended Walker   PT - OK to Discharge Yes  (when medically cleared, if to STR)   Modified [place] Scale   Modified [place] Scale 4   Barthel Index   Feeding 5   Bathing 0   Grooming Score 0   Dressing Score 0   Bladder Score 0   Bowels Score 5   Toilet Use Score 5   Transfers (Bed/Chair) Score 5   Mobility (Level Surface) Score 0   Stairs Score 0   Barthel Index Score 20           Leta Roberts, PT

## 2018-06-16 ENCOUNTER — APPOINTMENT (INPATIENT)
Dept: RADIOLOGY | Facility: HOSPITAL | Age: 83
DRG: 271 | End: 2018-06-16
Payer: MEDICARE

## 2018-06-16 ENCOUNTER — ANESTHESIA (INPATIENT)
Dept: PERIOP | Facility: HOSPITAL | Age: 83
DRG: 271 | End: 2018-06-16
Payer: MEDICARE

## 2018-06-16 ENCOUNTER — HOSPITAL ENCOUNTER (INPATIENT)
Facility: HOSPITAL | Age: 83
LOS: 6 days | Discharge: HOSPICE | DRG: 271 | End: 2018-06-22
Attending: INTERNAL MEDICINE | Admitting: INTERNAL MEDICINE
Payer: MEDICARE

## 2018-06-16 ENCOUNTER — ANESTHESIA EVENT (INPATIENT)
Dept: PERIOP | Facility: HOSPITAL | Age: 83
DRG: 271 | End: 2018-06-16
Payer: MEDICARE

## 2018-06-16 ENCOUNTER — DOCUMENTATION (OUTPATIENT)
Dept: MEDSURG UNIT | Facility: HOSPITAL | Age: 83
End: 2018-06-16

## 2018-06-16 VITALS
SYSTOLIC BLOOD PRESSURE: 113 MMHG | DIASTOLIC BLOOD PRESSURE: 71 MMHG | TEMPERATURE: 97.6 F | RESPIRATION RATE: 18 BRPM | HEART RATE: 94 BPM | WEIGHT: 119.71 LBS | OXYGEN SATURATION: 94 % | BODY MASS INDEX: 20.44 KG/M2 | HEIGHT: 64 IN

## 2018-06-16 DIAGNOSIS — I48.20 CHRONIC ATRIAL FIBRILLATION (HCC): ICD-10-CM

## 2018-06-16 DIAGNOSIS — R41.0 DELIRIUM: ICD-10-CM

## 2018-06-16 DIAGNOSIS — G30.1 LATE ONSET ALZHEIMER'S DISEASE WITH BEHAVIORAL DISTURBANCE (HCC): Chronic | ICD-10-CM

## 2018-06-16 DIAGNOSIS — I70.90 ARTERIAL OCCLUSION: Primary | ICD-10-CM

## 2018-06-16 DIAGNOSIS — F02.81 LATE ONSET ALZHEIMER'S DISEASE WITH BEHAVIORAL DISTURBANCE (HCC): Chronic | ICD-10-CM

## 2018-06-16 DIAGNOSIS — I73.9 PAD (PERIPHERAL ARTERY DISEASE) (HCC): ICD-10-CM

## 2018-06-16 DIAGNOSIS — F41.9 ANXIETY: ICD-10-CM

## 2018-06-16 PROBLEM — Z86.73 HISTORY OF ISCHEMIC LEFT MCA STROKE: Status: ACTIVE | Noted: 2018-06-16

## 2018-06-16 LAB
ABO GROUP BLD: NORMAL
ANION GAP SERPL CALCULATED.3IONS-SCNC: 9 MMOL/L (ref 4–13)
APTT PPP: 54 SECONDS (ref 24–36)
APTT PPP: 70 SECONDS (ref 24–36)
APTT PPP: 98 SECONDS (ref 24–36)
APTT PPP: >210 SECONDS (ref 24–36)
BLD GP AB SCN SERPL QL: NEGATIVE
BUN SERPL-MCNC: 15 MG/DL (ref 5–25)
CALCIUM SERPL-MCNC: 7.4 MG/DL (ref 8.3–10.1)
CHLORIDE SERPL-SCNC: 106 MMOL/L (ref 100–108)
CHOLEST SERPL-MCNC: 113 MG/DL (ref 50–200)
CO2 SERPL-SCNC: 25 MMOL/L (ref 21–32)
CREAT SERPL-MCNC: 1.08 MG/DL (ref 0.6–1.3)
ERYTHROCYTE [DISTWIDTH] IN BLOOD BY AUTOMATED COUNT: 14.6 % (ref 11.6–15.1)
ERYTHROCYTE [DISTWIDTH] IN BLOOD BY AUTOMATED COUNT: 14.6 % (ref 11.6–15.1)
EST. AVERAGE GLUCOSE BLD GHB EST-MCNC: 117 MG/DL
GFR SERPL CREATININE-BSD FRML MDRD: 45 ML/MIN/1.73SQ M
GLUCOSE SERPL-MCNC: 107 MG/DL (ref 65–140)
GLUCOSE SERPL-MCNC: 111 MG/DL (ref 65–140)
HBA1C MFR BLD: 5.7 % (ref 4.2–6.3)
HCT VFR BLD AUTO: 32.1 % (ref 34.8–46.1)
HCT VFR BLD AUTO: 35.7 % (ref 34.8–46.1)
HDLC SERPL-MCNC: 41 MG/DL (ref 40–60)
HGB BLD-MCNC: 10.4 G/DL (ref 11.5–15.4)
HGB BLD-MCNC: 11.7 G/DL (ref 11.5–15.4)
INR PPP: 1.24 (ref 0.86–1.17)
LDLC SERPL CALC-MCNC: 45 MG/DL (ref 0–100)
MCH RBC QN AUTO: 28.3 PG (ref 26.8–34.3)
MCH RBC QN AUTO: 28.9 PG (ref 26.8–34.3)
MCHC RBC AUTO-ENTMCNC: 32.4 G/DL (ref 31.4–37.4)
MCHC RBC AUTO-ENTMCNC: 32.8 G/DL (ref 31.4–37.4)
MCV RBC AUTO: 86 FL (ref 82–98)
MCV RBC AUTO: 89 FL (ref 82–98)
PLATELET # BLD AUTO: 278 THOUSANDS/UL (ref 149–390)
PLATELET # BLD AUTO: 292 THOUSANDS/UL (ref 149–390)
PMV BLD AUTO: 10.4 FL (ref 8.9–12.7)
PMV BLD AUTO: 10.5 FL (ref 8.9–12.7)
POTASSIUM SERPL-SCNC: 3.3 MMOL/L (ref 3.5–5.3)
PROTHROMBIN TIME: 15.5 SECONDS (ref 11.8–14.2)
RBC # BLD AUTO: 3.6 MILLION/UL (ref 3.81–5.12)
RBC # BLD AUTO: 4.13 MILLION/UL (ref 3.81–5.12)
RH BLD: POSITIVE
SODIUM SERPL-SCNC: 140 MMOL/L (ref 136–145)
SPECIMEN EXPIRATION DATE: NORMAL
TRIGL SERPL-MCNC: 134 MG/DL
WBC # BLD AUTO: 10.19 THOUSAND/UL (ref 4.31–10.16)
WBC # BLD AUTO: 10.74 THOUSAND/UL (ref 4.31–10.16)

## 2018-06-16 PROCEDURE — 85610 PROTHROMBIN TIME: CPT | Performed by: PHYSICIAN ASSISTANT

## 2018-06-16 PROCEDURE — 85730 THROMBOPLASTIN TIME PARTIAL: CPT | Performed by: PHYSICIAN ASSISTANT

## 2018-06-16 PROCEDURE — G8979 MOBILITY GOAL STATUS: HCPCS

## 2018-06-16 PROCEDURE — 97163 PT EVAL HIGH COMPLEX 45 MIN: CPT

## 2018-06-16 PROCEDURE — 99239 HOSP IP/OBS DSCHRG MGMT >30: CPT | Performed by: NURSE PRACTITIONER

## 2018-06-16 PROCEDURE — 97167 OT EVAL HIGH COMPLEX 60 MIN: CPT

## 2018-06-16 PROCEDURE — 04CH3ZZ EXTIRPATION OF MATTER FROM RIGHT EXTERNAL ILIAC ARTERY, PERCUTANEOUS APPROACH: ICD-10-PCS | Performed by: SURGERY

## 2018-06-16 PROCEDURE — 85730 THROMBOPLASTIN TIME PARTIAL: CPT | Performed by: SURGERY

## 2018-06-16 PROCEDURE — 85730 THROMBOPLASTIN TIME PARTIAL: CPT | Performed by: INTERNAL MEDICINE

## 2018-06-16 PROCEDURE — 85027 COMPLETE CBC AUTOMATED: CPT | Performed by: PHYSICIAN ASSISTANT

## 2018-06-16 PROCEDURE — 88304 TISSUE EXAM BY PATHOLOGIST: CPT | Performed by: PATHOLOGY

## 2018-06-16 PROCEDURE — 99223 1ST HOSP IP/OBS HIGH 75: CPT | Performed by: SURGERY

## 2018-06-16 PROCEDURE — 04CK3ZZ EXTIRPATION OF MATTER FROM RIGHT FEMORAL ARTERY, PERCUTANEOUS APPROACH: ICD-10-PCS | Performed by: SURGERY

## 2018-06-16 PROCEDURE — 99232 SBSQ HOSP IP/OBS MODERATE 35: CPT | Performed by: INTERNAL MEDICINE

## 2018-06-16 PROCEDURE — 86920 COMPATIBILITY TEST SPIN: CPT

## 2018-06-16 PROCEDURE — G8988 SELF CARE GOAL STATUS: HCPCS

## 2018-06-16 PROCEDURE — 34201 REMOVAL OF ARTERY CLOT: CPT | Performed by: SURGERY

## 2018-06-16 PROCEDURE — 82803 BLOOD GASES ANY COMBINATION: CPT

## 2018-06-16 PROCEDURE — 82330 ASSAY OF CALCIUM: CPT

## 2018-06-16 PROCEDURE — 82948 REAGENT STRIP/BLOOD GLUCOSE: CPT

## 2018-06-16 PROCEDURE — G8987 SELF CARE CURRENT STATUS: HCPCS

## 2018-06-16 PROCEDURE — 75710 ARTERY X-RAYS ARM/LEG: CPT

## 2018-06-16 PROCEDURE — 80048 BASIC METABOLIC PNL TOTAL CA: CPT | Performed by: SURGERY

## 2018-06-16 PROCEDURE — 86900 BLOOD TYPING SEROLOGIC ABO: CPT | Performed by: ANESTHESIOLOGY

## 2018-06-16 PROCEDURE — 04CE3ZZ EXTIRPATION OF MATTER FROM RIGHT INTERNAL ILIAC ARTERY, PERCUTANEOUS APPROACH: ICD-10-PCS | Performed by: SURGERY

## 2018-06-16 PROCEDURE — 85347 COAGULATION TIME ACTIVATED: CPT

## 2018-06-16 PROCEDURE — 86850 RBC ANTIBODY SCREEN: CPT | Performed by: ANESTHESIOLOGY

## 2018-06-16 PROCEDURE — 80061 LIPID PANEL: CPT | Performed by: INTERNAL MEDICINE

## 2018-06-16 PROCEDURE — 82947 ASSAY GLUCOSE BLOOD QUANT: CPT

## 2018-06-16 PROCEDURE — 75635 CT ANGIO ABDOMINAL ARTERIES: CPT

## 2018-06-16 PROCEDURE — 83036 HEMOGLOBIN GLYCOSYLATED A1C: CPT | Performed by: INTERNAL MEDICINE

## 2018-06-16 PROCEDURE — G8978 MOBILITY CURRENT STATUS: HCPCS

## 2018-06-16 PROCEDURE — 86901 BLOOD TYPING SEROLOGIC RH(D): CPT | Performed by: ANESTHESIOLOGY

## 2018-06-16 PROCEDURE — 84132 ASSAY OF SERUM POTASSIUM: CPT

## 2018-06-16 PROCEDURE — 85014 HEMATOCRIT: CPT

## 2018-06-16 PROCEDURE — 85027 COMPLETE CBC AUTOMATED: CPT | Performed by: SURGERY

## 2018-06-16 PROCEDURE — 84295 ASSAY OF SERUM SODIUM: CPT

## 2018-06-16 PROCEDURE — 04CC3ZZ EXTIRPATION OF MATTER FROM RIGHT COMMON ILIAC ARTERY, PERCUTANEOUS APPROACH: ICD-10-PCS | Performed by: SURGERY

## 2018-06-16 PROCEDURE — C1757 CATH, THROMBECTOMY/EMBOLECT: HCPCS | Performed by: SURGERY

## 2018-06-16 PROCEDURE — C1894 INTRO/SHEATH, NON-LASER: HCPCS | Performed by: SURGERY

## 2018-06-16 RX ORDER — ALBUMIN, HUMAN INJ 5% 5 %
SOLUTION INTRAVENOUS CONTINUOUS PRN
Status: DISCONTINUED | OUTPATIENT
Start: 2018-06-16 | End: 2018-06-16 | Stop reason: SURG

## 2018-06-16 RX ORDER — FENTANYL CITRATE 50 UG/ML
INJECTION, SOLUTION INTRAMUSCULAR; INTRAVENOUS AS NEEDED
Status: DISCONTINUED | OUTPATIENT
Start: 2018-06-16 | End: 2018-06-16 | Stop reason: SURG

## 2018-06-16 RX ORDER — QUETIAPINE FUMARATE 25 MG/1
25 TABLET, FILM COATED ORAL
Status: CANCELLED | OUTPATIENT
Start: 2018-06-16

## 2018-06-16 RX ORDER — HEPARIN SODIUM 1000 [USP'U]/ML
4000 INJECTION, SOLUTION INTRAVENOUS; SUBCUTANEOUS AS NEEDED
Status: DISCONTINUED | OUTPATIENT
Start: 2018-06-16 | End: 2018-06-16 | Stop reason: HOSPADM

## 2018-06-16 RX ORDER — ONDANSETRON 2 MG/ML
4 INJECTION INTRAMUSCULAR; INTRAVENOUS ONCE AS NEEDED
Status: DISCONTINUED | OUTPATIENT
Start: 2018-06-16 | End: 2018-06-16 | Stop reason: HOSPADM

## 2018-06-16 RX ORDER — ROCURONIUM BROMIDE 10 MG/ML
INJECTION, SOLUTION INTRAVENOUS AS NEEDED
Status: DISCONTINUED | OUTPATIENT
Start: 2018-06-16 | End: 2018-06-16 | Stop reason: SURG

## 2018-06-16 RX ORDER — HEPARIN SODIUM 10000 [USP'U]/100ML
3-30 INJECTION, SOLUTION INTRAVENOUS
Status: CANCELLED | OUTPATIENT
Start: 2018-06-16

## 2018-06-16 RX ORDER — HEPARIN SODIUM 1000 [USP'U]/ML
4000 INJECTION, SOLUTION INTRAVENOUS; SUBCUTANEOUS AS NEEDED
Status: DISCONTINUED | OUTPATIENT
Start: 2018-06-16 | End: 2018-06-20

## 2018-06-16 RX ORDER — QUETIAPINE FUMARATE 25 MG/1
25 TABLET, FILM COATED ORAL
Status: DISCONTINUED | OUTPATIENT
Start: 2018-06-16 | End: 2018-06-22 | Stop reason: HOSPADM

## 2018-06-16 RX ORDER — SACCHAROMYCES BOULARDII 250 MG
250 CAPSULE ORAL 2 TIMES DAILY
Status: CANCELLED | OUTPATIENT
Start: 2018-06-16

## 2018-06-16 RX ORDER — OXYCODONE HYDROCHLORIDE 5 MG/1
2.5 TABLET ORAL EVERY 6 HOURS PRN
Status: DISCONTINUED | OUTPATIENT
Start: 2018-06-16 | End: 2018-06-19

## 2018-06-16 RX ORDER — HEPARIN SODIUM 10000 [USP'U]/100ML
3-30 INJECTION, SOLUTION INTRAVENOUS
Status: DISCONTINUED | OUTPATIENT
Start: 2018-06-16 | End: 2018-06-16 | Stop reason: HOSPADM

## 2018-06-16 RX ORDER — DONEPEZIL HYDROCHLORIDE 5 MG/1
5 TABLET, FILM COATED ORAL
Status: CANCELLED | OUTPATIENT
Start: 2018-06-16

## 2018-06-16 RX ORDER — CHOLECALCIFEROL (VITAMIN D3) 125 MCG
500 CAPSULE ORAL DAILY
Status: DISCONTINUED | OUTPATIENT
Start: 2018-06-16 | End: 2018-06-19

## 2018-06-16 RX ORDER — HEPARIN SODIUM 1000 [USP'U]/ML
INJECTION, SOLUTION INTRAVENOUS; SUBCUTANEOUS AS NEEDED
Status: DISCONTINUED | OUTPATIENT
Start: 2018-06-16 | End: 2018-06-16 | Stop reason: SURG

## 2018-06-16 RX ORDER — HEPARIN SODIUM 10000 [USP'U]/100ML
3-30 INJECTION, SOLUTION INTRAVENOUS
Status: DISCONTINUED | OUTPATIENT
Start: 2018-06-16 | End: 2018-06-20

## 2018-06-16 RX ORDER — PROPOFOL 10 MG/ML
INJECTION, EMULSION INTRAVENOUS AS NEEDED
Status: DISCONTINUED | OUTPATIENT
Start: 2018-06-16 | End: 2018-06-16 | Stop reason: SURG

## 2018-06-16 RX ORDER — LOPERAMIDE HYDROCHLORIDE 2 MG/1
2 CAPSULE ORAL 4 TIMES DAILY PRN
Status: CANCELLED | OUTPATIENT
Start: 2018-06-16

## 2018-06-16 RX ORDER — SACCHAROMYCES BOULARDII 250 MG
250 CAPSULE ORAL 2 TIMES DAILY
Status: DISCONTINUED | OUTPATIENT
Start: 2018-06-16 | End: 2018-06-22 | Stop reason: HOSPADM

## 2018-06-16 RX ORDER — CHOLECALCIFEROL (VITAMIN D3) 125 MCG
500 CAPSULE ORAL DAILY
Status: CANCELLED | OUTPATIENT
Start: 2018-06-16

## 2018-06-16 RX ORDER — FENTANYL CITRATE/PF 50 MCG/ML
25 SYRINGE (ML) INJECTION
Status: DISCONTINUED | OUTPATIENT
Start: 2018-06-16 | End: 2018-06-16 | Stop reason: HOSPADM

## 2018-06-16 RX ORDER — PANTOPRAZOLE SODIUM 40 MG/1
40 TABLET, DELAYED RELEASE ORAL
Status: DISCONTINUED | OUTPATIENT
Start: 2018-06-16 | End: 2018-06-22 | Stop reason: HOSPADM

## 2018-06-16 RX ORDER — DONEPEZIL HYDROCHLORIDE 5 MG/1
5 TABLET, FILM COATED ORAL
Status: DISCONTINUED | OUTPATIENT
Start: 2018-06-16 | End: 2018-06-22 | Stop reason: HOSPADM

## 2018-06-16 RX ORDER — LORAZEPAM 0.5 MG/1
0.5 TABLET ORAL EVERY 8 HOURS PRN
Status: CANCELLED | OUTPATIENT
Start: 2018-06-16

## 2018-06-16 RX ORDER — LOPERAMIDE HYDROCHLORIDE 2 MG/1
2 CAPSULE ORAL 4 TIMES DAILY PRN
Status: DISCONTINUED | OUTPATIENT
Start: 2018-06-16 | End: 2018-06-19

## 2018-06-16 RX ORDER — ATENOLOL 50 MG/1
50 TABLET ORAL DAILY
Status: CANCELLED | OUTPATIENT
Start: 2018-06-16

## 2018-06-16 RX ORDER — OXYCODONE HYDROCHLORIDE 5 MG/1
5 TABLET ORAL EVERY 4 HOURS PRN
Status: DISCONTINUED | OUTPATIENT
Start: 2018-06-16 | End: 2018-06-19

## 2018-06-16 RX ORDER — HEPARIN SODIUM 1000 [USP'U]/ML
2000 INJECTION, SOLUTION INTRAVENOUS; SUBCUTANEOUS AS NEEDED
Status: CANCELLED | OUTPATIENT
Start: 2018-06-16

## 2018-06-16 RX ORDER — AMLODIPINE BESYLATE 10 MG/1
10 TABLET ORAL DAILY
Status: DISCONTINUED | OUTPATIENT
Start: 2018-06-16 | End: 2018-06-17

## 2018-06-16 RX ORDER — GLYCOPYRROLATE 0.2 MG/ML
INJECTION INTRAMUSCULAR; INTRAVENOUS AS NEEDED
Status: DISCONTINUED | OUTPATIENT
Start: 2018-06-16 | End: 2018-06-16 | Stop reason: SURG

## 2018-06-16 RX ORDER — ONDANSETRON 2 MG/ML
4 INJECTION INTRAMUSCULAR; INTRAVENOUS EVERY 6 HOURS PRN
Status: DISCONTINUED | OUTPATIENT
Start: 2018-06-16 | End: 2018-06-22 | Stop reason: HOSPADM

## 2018-06-16 RX ORDER — MEMANTINE HYDROCHLORIDE 5 MG/1
5 TABLET ORAL 2 TIMES DAILY
Status: CANCELLED | OUTPATIENT
Start: 2018-06-16

## 2018-06-16 RX ORDER — SUCRALFATE ORAL 1 G/10ML
1000 SUSPENSION ORAL EVERY 6 HOURS SCHEDULED
Status: DISCONTINUED | OUTPATIENT
Start: 2018-06-16 | End: 2018-06-22 | Stop reason: HOSPADM

## 2018-06-16 RX ORDER — ONDANSETRON 2 MG/ML
INJECTION INTRAMUSCULAR; INTRAVENOUS AS NEEDED
Status: DISCONTINUED | OUTPATIENT
Start: 2018-06-16 | End: 2018-06-16 | Stop reason: SURG

## 2018-06-16 RX ORDER — CEFAZOLIN SODIUM 1 G/3ML
INJECTION, POWDER, FOR SOLUTION INTRAMUSCULAR; INTRAVENOUS AS NEEDED
Status: DISCONTINUED | OUTPATIENT
Start: 2018-06-16 | End: 2018-06-16 | Stop reason: SURG

## 2018-06-16 RX ORDER — ACETAMINOPHEN 325 MG/1
650 TABLET ORAL EVERY 6 HOURS PRN
Status: DISCONTINUED | OUTPATIENT
Start: 2018-06-16 | End: 2018-06-16

## 2018-06-16 RX ORDER — PANTOPRAZOLE SODIUM 40 MG/1
40 TABLET, DELAYED RELEASE ORAL
Status: CANCELLED | OUTPATIENT
Start: 2018-06-16

## 2018-06-16 RX ORDER — SODIUM CHLORIDE 9 MG/ML
100 INJECTION, SOLUTION INTRAVENOUS CONTINUOUS
Status: DISCONTINUED | OUTPATIENT
Start: 2018-06-16 | End: 2018-06-20

## 2018-06-16 RX ORDER — ATENOLOL 50 MG/1
50 TABLET ORAL DAILY
Status: DISCONTINUED | OUTPATIENT
Start: 2018-06-16 | End: 2018-06-16

## 2018-06-16 RX ORDER — HEPARIN SODIUM 1000 [USP'U]/ML
4000 INJECTION, SOLUTION INTRAVENOUS; SUBCUTANEOUS AS NEEDED
Status: CANCELLED | OUTPATIENT
Start: 2018-06-16

## 2018-06-16 RX ORDER — VALSARTAN 160 MG/1
320 TABLET ORAL DAILY
Status: DISCONTINUED | OUTPATIENT
Start: 2018-06-16 | End: 2018-06-16

## 2018-06-16 RX ORDER — AMLODIPINE BESYLATE 10 MG/1
10 TABLET ORAL DAILY
Status: CANCELLED | OUTPATIENT
Start: 2018-06-16

## 2018-06-16 RX ORDER — VALSARTAN 160 MG/1
320 TABLET ORAL DAILY
Status: CANCELLED | OUTPATIENT
Start: 2018-06-16

## 2018-06-16 RX ORDER — POTASSIUM CHLORIDE 20 MEQ/1
40 TABLET, EXTENDED RELEASE ORAL ONCE
Status: COMPLETED | OUTPATIENT
Start: 2018-06-16 | End: 2018-06-16

## 2018-06-16 RX ORDER — SODIUM CHLORIDE 9 MG/ML
INJECTION, SOLUTION INTRAVENOUS CONTINUOUS PRN
Status: DISCONTINUED | OUTPATIENT
Start: 2018-06-16 | End: 2018-06-16 | Stop reason: SURG

## 2018-06-16 RX ORDER — LORAZEPAM 0.5 MG/1
0.5 TABLET ORAL EVERY 8 HOURS PRN
Status: DISCONTINUED | OUTPATIENT
Start: 2018-06-16 | End: 2018-06-19

## 2018-06-16 RX ORDER — ACETAMINOPHEN 325 MG/1
650 TABLET ORAL EVERY 6 HOURS PRN
Status: CANCELLED | OUTPATIENT
Start: 2018-06-16

## 2018-06-16 RX ORDER — ATENOLOL 50 MG/1
100 TABLET ORAL DAILY
Status: DISCONTINUED | OUTPATIENT
Start: 2018-06-17 | End: 2018-06-22 | Stop reason: HOSPADM

## 2018-06-16 RX ORDER — HEPARIN SODIUM 1000 [USP'U]/ML
2000 INJECTION, SOLUTION INTRAVENOUS; SUBCUTANEOUS AS NEEDED
Status: DISCONTINUED | OUTPATIENT
Start: 2018-06-16 | End: 2018-06-20

## 2018-06-16 RX ORDER — VALSARTAN 160 MG/1
160 TABLET ORAL DAILY
Status: DISCONTINUED | OUTPATIENT
Start: 2018-06-17 | End: 2018-06-16

## 2018-06-16 RX ORDER — HEPARIN SODIUM 1000 [USP'U]/ML
4000 INJECTION, SOLUTION INTRAVENOUS; SUBCUTANEOUS ONCE
Status: DISCONTINUED | OUTPATIENT
Start: 2018-06-16 | End: 2018-06-16 | Stop reason: HOSPADM

## 2018-06-16 RX ORDER — MEMANTINE HYDROCHLORIDE 5 MG/1
5 TABLET ORAL 2 TIMES DAILY
Status: DISCONTINUED | OUTPATIENT
Start: 2018-06-16 | End: 2018-06-22 | Stop reason: HOSPADM

## 2018-06-16 RX ORDER — SODIUM CHLORIDE, SODIUM LACTATE, POTASSIUM CHLORIDE, CALCIUM CHLORIDE 600; 310; 30; 20 MG/100ML; MG/100ML; MG/100ML; MG/100ML
20 INJECTION, SOLUTION INTRAVENOUS CONTINUOUS
Status: DISCONTINUED | OUTPATIENT
Start: 2018-06-16 | End: 2018-06-18

## 2018-06-16 RX ORDER — ACETAMINOPHEN 325 MG/1
975 TABLET ORAL EVERY 8 HOURS SCHEDULED
Status: DISCONTINUED | OUTPATIENT
Start: 2018-06-16 | End: 2018-06-16

## 2018-06-16 RX ORDER — HEPARIN SODIUM 1000 [USP'U]/ML
2000 INJECTION, SOLUTION INTRAVENOUS; SUBCUTANEOUS AS NEEDED
Status: DISCONTINUED | OUTPATIENT
Start: 2018-06-16 | End: 2018-06-16 | Stop reason: HOSPADM

## 2018-06-16 RX ORDER — SUCRALFATE ORAL 1 G/10ML
1000 SUSPENSION ORAL EVERY 6 HOURS SCHEDULED
Status: CANCELLED | OUTPATIENT
Start: 2018-06-16

## 2018-06-16 RX ORDER — EPHEDRINE SULFATE 50 MG/ML
INJECTION, SOLUTION INTRAVENOUS AS NEEDED
Status: DISCONTINUED | OUTPATIENT
Start: 2018-06-16 | End: 2018-06-16 | Stop reason: SURG

## 2018-06-16 RX ADMIN — ATENOLOL 50 MG: 50 TABLET ORAL at 09:22

## 2018-06-16 RX ADMIN — LORAZEPAM 0.5 MG: 0.5 TABLET ORAL at 09:23

## 2018-06-16 RX ADMIN — HEPARIN SODIUM 1000 UNITS: 1000 INJECTION INTRAVENOUS; SUBCUTANEOUS at 20:19

## 2018-06-16 RX ADMIN — POTASSIUM CHLORIDE 40 MEQ: 1500 TABLET, EXTENDED RELEASE ORAL at 23:58

## 2018-06-16 RX ADMIN — FENTANYL CITRATE 50 MCG: 50 INJECTION, SOLUTION INTRAMUSCULAR; INTRAVENOUS at 20:33

## 2018-06-16 RX ADMIN — HEPARIN SODIUM 3000 UNITS: 1000 INJECTION, SOLUTION INTRAVENOUS; SUBCUTANEOUS at 00:08

## 2018-06-16 RX ADMIN — SUCRALFATE 1000 MG: 1 SUSPENSION ORAL at 00:41

## 2018-06-16 RX ADMIN — HEPARIN SODIUM AND DEXTROSE 12 UNITS/KG/HR: 10000; 5 INJECTION INTRAVENOUS at 00:11

## 2018-06-16 RX ADMIN — VANCOMYCIN HYDROCHLORIDE 125 MG: 5 INJECTION, POWDER, LYOPHILIZED, FOR SOLUTION INTRAVENOUS at 00:31

## 2018-06-16 RX ADMIN — VANCOMYCIN HYDROCHLORIDE 125 MG: 10 INJECTION, POWDER, LYOPHILIZED, FOR SOLUTION INTRAVENOUS at 16:53

## 2018-06-16 RX ADMIN — HEPARIN SODIUM 2000 UNITS: 1000 INJECTION, SOLUTION INTRAVENOUS; SUBCUTANEOUS at 07:31

## 2018-06-16 RX ADMIN — FENTANYL CITRATE 25 MCG: 50 INJECTION, SOLUTION INTRAMUSCULAR; INTRAVENOUS at 21:51

## 2018-06-16 RX ADMIN — HEPARIN SODIUM 18 UNITS/KG/HR: 10000 INJECTION, SOLUTION INTRAVENOUS at 04:56

## 2018-06-16 RX ADMIN — CYANOCOBALAMIN TAB 500 MCG 500 MCG: 500 TAB at 09:23

## 2018-06-16 RX ADMIN — ACETAMINOPHEN 975 MG: 325 TABLET, FILM COATED ORAL at 16:48

## 2018-06-16 RX ADMIN — VANCOMYCIN HYDROCHLORIDE 125 MG: 10 INJECTION, POWDER, LYOPHILIZED, FOR SOLUTION INTRAVENOUS at 12:04

## 2018-06-16 RX ADMIN — ONDANSETRON 4 MG: 2 INJECTION INTRAMUSCULAR; INTRAVENOUS at 19:54

## 2018-06-16 RX ADMIN — SUCRALFATE 1000 MG: 1 SUSPENSION ORAL at 12:04

## 2018-06-16 RX ADMIN — ROCURONIUM BROMIDE 40 MG: 10 INJECTION INTRAVENOUS at 19:30

## 2018-06-16 RX ADMIN — Medication 250 MG: at 09:22

## 2018-06-16 RX ADMIN — SUCRALFATE 1000 MG: 1 SUSPENSION ORAL at 23:58

## 2018-06-16 RX ADMIN — HEPARIN SODIUM 18 UNITS/KG/HR: 10000 INJECTION, SOLUTION INTRAVENOUS at 16:01

## 2018-06-16 RX ADMIN — OXYCODONE HYDROCHLORIDE 5 MG: 5 TABLET ORAL at 23:59

## 2018-06-16 RX ADMIN — SUCRALFATE 1000 MG: 1 SUSPENSION ORAL at 16:48

## 2018-06-16 RX ADMIN — OXYCODONE HYDROCHLORIDE 5 MG: 5 TABLET ORAL at 17:33

## 2018-06-16 RX ADMIN — EPHEDRINE SULFATE 5 MG: 50 INJECTION, SOLUTION INTRAMUSCULAR; INTRAVENOUS; SUBCUTANEOUS at 19:37

## 2018-06-16 RX ADMIN — ACETAMINOPHEN 650 MG: 325 TABLET, FILM COATED ORAL at 06:14

## 2018-06-16 RX ADMIN — HEPARIN SODIUM 5000 UNITS: 1000 INJECTION INTRAVENOUS; SUBCUTANEOUS at 20:11

## 2018-06-16 RX ADMIN — DEXAMETHASONE SODIUM PHOSPHATE 10 MG: 10 INJECTION INTRAMUSCULAR; INTRAVENOUS at 19:54

## 2018-06-16 RX ADMIN — CEFAZOLIN 1000 MG: 1 INJECTION, POWDER, FOR SOLUTION INTRAVENOUS at 19:45

## 2018-06-16 RX ADMIN — PANTOPRAZOLE SODIUM 40 MG: 40 TABLET, DELAYED RELEASE ORAL at 16:48

## 2018-06-16 RX ADMIN — ALBUMIN (HUMAN): 12.5 SOLUTION INTRAVENOUS at 21:05

## 2018-06-16 RX ADMIN — ALBUMIN (HUMAN): 12.5 SOLUTION INTRAVENOUS at 19:39

## 2018-06-16 RX ADMIN — NEOSTIGMINE METHYLSULFATE 3 MG: 1 INJECTION, SOLUTION INTRAMUSCULAR; INTRAVENOUS; SUBCUTANEOUS at 21:10

## 2018-06-16 RX ADMIN — VASOPRESSIN 1 UNITS: 20 INJECTION, SOLUTION INTRAMUSCULAR; SUBCUTANEOUS at 19:38

## 2018-06-16 RX ADMIN — VALSARTAN 320 MG: 160 TABLET ORAL at 10:09

## 2018-06-16 RX ADMIN — LORAZEPAM 0.5 MG: 0.5 TABLET ORAL at 23:59

## 2018-06-16 RX ADMIN — VANCOMYCIN HYDROCHLORIDE 125 MG: 10 INJECTION, POWDER, LYOPHILIZED, FOR SOLUTION INTRAVENOUS at 10:09

## 2018-06-16 RX ADMIN — GLYCOPYRROLATE 0.4 MG: 0.2 INJECTION, SOLUTION INTRAMUSCULAR; INTRAVENOUS at 21:10

## 2018-06-16 RX ADMIN — SODIUM CHLORIDE: 0.9 INJECTION, SOLUTION INTRAVENOUS at 19:35

## 2018-06-16 RX ADMIN — MEMANTINE HYDROCHLORIDE 5 MG: 5 TABLET ORAL at 16:48

## 2018-06-16 RX ADMIN — FENTANYL CITRATE 25 MCG: 50 INJECTION, SOLUTION INTRAMUSCULAR; INTRAVENOUS at 20:07

## 2018-06-16 RX ADMIN — SODIUM CHLORIDE 100 ML/HR: 0.9 INJECTION, SOLUTION INTRAVENOUS at 16:00

## 2018-06-16 RX ADMIN — SUCRALFATE 1000 MG: 1 SUSPENSION ORAL at 06:14

## 2018-06-16 RX ADMIN — PANTOPRAZOLE SODIUM 40 MG: 40 TABLET, DELAYED RELEASE ORAL at 06:14

## 2018-06-16 RX ADMIN — AMLODIPINE BESYLATE 10 MG: 10 TABLET ORAL at 09:23

## 2018-06-16 RX ADMIN — PROPOFOL 100 MG: 10 INJECTION, EMULSION INTRAVENOUS at 19:30

## 2018-06-16 RX ADMIN — IOHEXOL 100 ML: 350 INJECTION, SOLUTION INTRAVENOUS at 13:06

## 2018-06-16 RX ADMIN — MEMANTINE HYDROCHLORIDE 5 MG: 5 TABLET ORAL at 09:22

## 2018-06-16 RX ADMIN — Medication 250 MG: at 16:48

## 2018-06-16 NOTE — ASSESSMENT & PLAN NOTE
S/P fall, likely secondary to physical deconditioning, muscle weakness, Dementia, C diff, vascular disorder  Pt/OT/ Case management for discharge planning  Fall and safety precaution  Bed Alarm

## 2018-06-16 NOTE — PROGRESS NOTES
Was paged regarding patient with right cold extremity and non-palpable pulses  Unsure as to when this started per nursing  Pt seen and evaluated, states that she is having burning pain in her right leg and foot currently  Unable to palpate distal pulses on the RLE and unable to find any distal pulses with doppler bilaterally (attempted by nursing, myself and other provider)  Right leg has pallor and is cold to touch  Pt does have history of atherosclerotic disease of bilateral lower extremities s/p intervention on the left  Pt is supposed to be anticoagulated, however she was recently hospitalized at the end of May for melena and this was discontinued  Spoke to vascular surgery on call, recommending heparin gtt and possible transfer to [address] based on family wishes  Pt is demented, 80years old, DNR/DNI  Attempted to call patient's grandson but unable to reach, message left  Would start patient on heparin gtt now as benefit outweighs the risk and patient's hgb is currently stable at 11 5  Monitor closely  Continue to try to reach family tonight, sign out left to night provider if patient would need to be transferred  However vascular surgery noted that since we are unsure of onset of symptoms, may not be a surgical candidate anyway  Unsure if patient has POA, not able to see in the notes  Regardless patient will need vascular surgery consult  Also put in for STAT arterial duplex  Monitor closely       Yvrose Roche PA-C

## 2018-06-16 NOTE — PHYSICAL THERAPY NOTE
PHYSICAL THERAPY EVALUATION  NAME: Anthony Garduno  AGE:   80 y o  MRN:  [id number]  ADMIT DX: Ischemic leg [I99 8]    PMH:   Past Medical History:   Diagnosis Date    Alzheimer disease     Arterial occlusion     left leg    Atrial fibrillation (HCC)     Benign hypertension     Cardiac disease     Dementia     Hypertension     Renal disorder      LENGTH OF STAY: 0     06/16/18 0844   Pain Assessment   Pain Assessment FLACC  (Simultaneous filing  User may not have seen previous data )   Pain Location Leg   Pain Orientation Lower;Right   Hospital Pain Intervention(s) Repositioned   Response to Interventions limited activity tolerance   Pain Rating: FLACC (Rest) - Face 1   Pain Rating: FLACC (Rest) - Legs 0   Pain Rating: FLACC (Rest) - Activity 0   Pain Rating: FLACC (Rest) - Cry 1   Pain Rating: FLACC (Rest) - Consolability 0   Score: FLACC (Rest) 2   Pain Rating: FLACC (Activity) - Face 2   Pain Rating: FLACC (Activity) - Legs 1   Pain Rating: FLACC (Activity) - Activity 1   Pain Rating: FLACC (Activity) - Cry 2   Pain Rating: FLACC (Activity) - Consolability 1   Score: FLACC (Activity) 7   Home Living   Type of Home Assisted living  (Per chart, Cinda Swanson)   Home Layout One level   Additional Comments Pt reports ambulating independently without AD at baseline  Prior Function   Level of [place] Needs assistance with ADLs and functional mobility   Lives With Facility staff   Receives Help From Personal care attendant   ADL Assistance Needs assistance   IADLs Needs assistance   Falls in the last 6 months (per chart (+) falls)   Comments Pt is a questionable historian  Restrictions/Precautions   Weight Bearing Precautions Per Order No   Other Precautions 1:1;Cognitive; Impulsive;Multiple lines; Fall Risk;Pain   General   Family/Caregiver Present (1:1 present)   Cognition   Overall Cognitive Status Impaired   Arousal/Participation Cooperative   Attention Difficulty attending to directions Orientation Level Oriented to person;Disoriented to place; Disoriented to time;Disoriented to situation   Memory Decreased short term memory;Decreased recall of recent events;Decreased recall of precautions   Following Commands Follows one step commands with increased time or repetition   Comments Pt identified by name and   RLE Assessment   RLE Assessment X   Strength RLE   R Knee Extension 3-/5   R Ankle Dorsiflexion 0/5   LLE Assessment   LLE Assessment X   Strength LLE   L Knee Extension 3+/5   L Ankle Dorsiflexion 4-/5   Bed Mobility   Supine to Sit 3  Moderate assistance   Additional items Assist x 1;HOB elevated; Bedrails; Increased time required; Impulsive;Verbal cues;LE management   Sit to Supine 3  Moderate assistance   Additional items Assist x 1; Increased time required; Impulsive;Verbal cues;LE management   Additional Comments SBA required for repositioning in bed with verbal cues  Transfers   Sit to Stand 3  Moderate assistance   Additional items Assist x 2; Increased time required;Verbal cues  (x3 attempts)   Stand to Sit 3  Moderate assistance   Additional items Assist x 2;Impulsive;Verbal cues   Additional Comments Pt declining further mobility due to pain  Ambulation/Elevation   Gait pattern Not appropriate  (unable to assess due to pain)   Balance   Static Sitting Fair   Dynamic Sitting Fair -   Static Standing Poor   Endurance Deficit   Endurance Deficit Yes   Endurance Deficit Description limited standing tolerance, pain   Activity Tolerance   Activity Tolerance Patient limited by pain   Nurse Made Aware Per RN, pt appropriate to evaluate   Assessment   Prognosis Fair   Problem List Decreased strength;Decreased endurance; Impaired balance;Decreased mobility; Decreased cognition;Decreased safety awareness   Goals   Patient Goals Pt reports she wants to go to the bathroom      STG Expiration Date 18   Short Term Goal #1 Pt will be able to: (1) perform bed mobility with mod I (2) perform sit to stand with min A x1 (3) perform SPT with min A x1 (4) PT to see for further assessment of ambulation (5) initiation of HEP (6) increase standing balance by 1 grade    Treatment Day 0   Plan   Treatment/Interventions Functional transfer training;LE strengthening/ROM; Therapeutic exercise; Endurance training;Cognitive reorientation;Patient/family training;Equipment eval/education; Bed mobility   PT Frequency (3-5x/week)   Recommendation   Recommendation Post acute IP rehab   Equipment Recommended Walker   Barthel Index   Feeding 5   Bathing 0   Grooming Score 0   Dressing Score 5   Bladder Score 0   Bowels Score 5   Toilet Use Score 5   Transfers (Bed/Chair) Score 5   Mobility (Level Surface) Score 0   Stairs Score 0   Barthel Index Score 25       Assessment: Pt is a 80 y o  female seen for PT evaluation s/p admit to [facility] on 6/16/2018 w/ Arterial occlusion  Order placed for PT  Comorbidities affecting pt's physical performance at time of assessment listed above  Personal factors affecting pt at time of IE include: advanced age, past experience, inability to perform IADLs, inability to perform ADLs, limited insight into impairments and recent fall(s)  Prior to admission, pt was living at [address] [facility] presenting with multiple falls per chart  Pt reports she was ambulating independently without AD at baseline, however pt is a questionable historian  Upon evaluation: Pt requires mod A x1 for bed mobility and mod A x2 for sit to stand  3 standing attempts made, however pt was unable to ambulate or perform SPT due to increased pain in RLE  Pt impulsively sitting down on EOB    (Please find full objective findings from PT assessment regarding body systems outlined above)  Impairments and limitations also listed above, especially due to  weakness, impaired balance, decreased endurance, pain, decreased activity tolerance, decreased safety awareness, impaired judgement, fall risk and decreased cognition  The following objective measures performed on IE also reveal limitations: Barthel Index 25/100  Pt's clinical presentation is currently unstable/unpredictable seen in pt's presentation of decreased cognitive status, decreased safety awareness, requires 1:1, increased pain with mobility (limiting), and high fall risk  Pt to benefit from continued skilled PT tx while in hospital and upon DC to address deficits as defined above and maximize level of functional mobility  From PT/mobility standpoint, recommendation at time of d/c would be inpatient rehab pending progress in order to maximize pt's functional independence and consistency w/ mobility in order to facilitate return to PLOF  Recommend progression of standing trials/transfers, initiation of HEP, and PT to see for ambulation trials        Kendrick Crytsal, PT,DPT

## 2018-06-16 NOTE — H&P
H&P- Bert Cannon 3/28/1926, 80 y o  female MRN: [id number]    Unit/Bed#: [facility] 525-01 Encounter: [id number]    Primary Care Provider: No primary care provider on file  Date and time admitted to hospital: 6/16/2018  4:36 AM      Assessment/Plan:  80 y o  female with a medical history significant for afib with recent discontinuation of anticoag in May 2/2 melena, PVD, dementia, recent c diff now on vancomycin, CKD, HTN, GERD who is transferred from [facility] with concerns for ischemic bilateral legs    # Bilateral leg vascular disease  - c/f arterial occlusion given new presentation of cold, pulseless legs R>L  - continue heparin gtt  - vascular consult  - will keep NPO for now pending vascular input    # Falls, weakness  - multifactorial 2/2 dementia, physical deconditioning, infection  - fall and safety precautions  - PT/OT  - continue 1:1 for dementia and fall risk, behavioral disturbance    # Chronic MCA territory infarcts  - noted on CT brain from 6/14 and 6/13  - has known afib  - check LDL and A1c    # C diff   - continue outpatient regimen with vancomycin to be completed on 6/25  - contact precautions    # Alzheimers dementia  - cont ativan, seroquel, aricept, namenda    # Afib - cont atenolol, on heparin drip  # HTN - cont norvasc, atenolol, valsartan  # GERD - cont ppi      FEN: Diet NPO; Sips with meds  VTE Prophylaxis: Heparin Drip    Code: Level 3 - DNAR and DNI per records    I have discussed the plan of care with: admission    Anticipated Length of Stay:  Patient will be admitted on an Inpatient basis with an anticipated length of stay of greater than 2 midnights  Justification for Hospital Stay: ischemic limbs    Total Time for Visit, including Counseling / Coordination of Care: 1 hour  Greater than 50% of this total time spent on direct patient counseling and coordination of care          Chief Complaint: limb ischemia    History of Present Illness:  Bert Cannon is a 80 y o  female with a medical history significant for afib with recent discontinuation of anticoag in May 2/2 melena, PVD, dementia, recent c diff now on vancomycin, CKD, HTN, GERD who is transferred from [facility] with concerns for ischemic bilateral legs  She was admitted there on 6/14 with multiple falls thought to be multifactorial including dementia and deconditioning  She has had behavior disturbance and aggression since being at [facility]  Tonight, she was noted to have a cold RLE that was pulseless, cyanosis  Left leg was also pale and cold  Ultrasound in the past has shown significant bilateral arterial occlusive disease  Vascular was contacted and advised heparin drip and contacting the family for goals of care, though onset of symptoms was unclear and may not be a surgical candidate  Multiple attempts were made by staff at [facility] to contact family but were unsuccessful  She was therefore transferred to [facility]  On my evaluation, patient is confused and provides minimal history  She denies having any pain  Review of Systems:  All other review of systems reviewed and are negative except for as above in HPI  Past Medical History:     Past Medical History:   Diagnosis Date    Alzheimer disease     Arterial occlusion     left leg    Atrial fibrillation ([address] )     Benign hypertension     Cardiac disease     Dementia     Hypertension     Renal disorder        Past Surgical History:    Past Surgical History:   Procedure Laterality Date    ESOPHAGOGASTRODUODENOSCOPY N/A 5/29/2018    Procedure: ESOPHAGOGASTRODUODENOSCOPY (EGD);  pt has cdiff;  Surgeon: Pinky Almeida MD;  Location: [facility]; Service: Gastroenterology       Home Medications:    Prior to Admission medications    Medication Sig Start Date End Date Taking?  Authorizing Provider   acetaminophen (TYLENOL) 325 mg tablet Take 650 mg by mouth every 6 (six) hours as needed for mild pain    Historical Provider, MD   amLODIPine (NORVASC) 10 mg tablet Take by mouth Historical Provider, MD   atenolol (TENORMIN) 50 mg tablet Take by mouth 6/7/17   Historical Provider, MD   donepezil (ARICEPT) 5 mg tablet Take 5 mg by mouth daily at bedtime    Historical Provider, MD   loperamide (IMODIUM A-D) 2 MG tablet Take 2 mg by mouth 4 (four) times a day as needed for diarrhea    Historical Provider, MD   LORazepam (ATIVAN) 0 5 mg tablet Take 0 5 mg by mouth every 8 (eight) hours as needed for anxiety    Historical Provider, MD   memantine (NAMENDA) 5 mg tablet Take 5 mg by mouth 2 (two) times a day    Historical Provider, MD   mupirocin (BACTROBAN) 2 % ointment Apply topically daily    Historical Provider, MD   pantoprazole (PROTONIX) 40 mg tablet Take 1 tablet (40 mg total) by mouth 2 (two) times a day before meals 5/30/18   Sulaiman Gee MD   QUEtiapine (SEROquel) 25 mg tablet Take by mouth    Historical Provider, MD   saccharomyces boulardii (FLORASTOR) 250 mg capsule Take 1 capsule (250 mg total) by mouth 2 (two) times a day 5/30/18   Sulaiman Gee MD   sucralfate (CARAFATE) 1 g/10 mL suspension Take 10 mL (1,000 mg total) by mouth every 6 (six) hours 5/30/18   Sulaiman Gee MD   valsartan (DIOVAN) 320 MG tablet Take by mouth    Historical Provider, MD   vancomycin (VANCOCIN) 125 MG capsule Take 1 capsule (125 mg total) by mouth 4 (four) times a day for 14 days 6/11/18 6/25/18  Caity Hernandez DO     Allergies:  No Known Allergies    Social History:     Marital Status:     Substance Use History:   History   Alcohol Use No     History   Smoking Status    Never Smoker   Smokeless Tobacco    Never Used     History   Drug Use No       Family History:  Family History   Problem Relation Age of Onset    Hypertension Mother        Physical Exam:     Vitals:   Blood Pressure: 110/79 (06/16/18 0437)  Pulse: (!) 120 (06/16/18 0437)  SpO2: 96 % (06/16/18 0437)    General: Awake, alert, no acute distress  HEENT: NC/AT, EOMI  Neck: supple, no LAD  Lungs: CTA bl, no w/c/r  Heart: regular, nl S1/S2, no appreciable murmurs  Abdomen: +BS, soft, NT/ND  Back: no spinal tenderness  Ext: no LE edema, cold legs R>L, no doppler pulses  Skin: no rash  Neuro: moving extremities but doesn't follow commands well    Additional Data:     Lab Results: I have personally reviewed pertinent reports  and I have personally reviewed pertinent films in PACS      Results from last 7 days  Lab Units 06/16/18  0040 06/15/18  0511   WBC Thousand/uL 10 19* 6 48   HEMOGLOBIN g/dL 11 7 11 5   HEMATOCRIT % 35 7 34 6*   PLATELETS Thousands/uL 292 241   NEUTROS PCT %  --  69   LYMPHS PCT %  --  18   MONOS PCT %  --  10   EOS PCT %  --  1       Results from last 7 days  Lab Units 06/15/18  0511  06/11/18  0135   SODIUM mmol/L 141  < > 140   POTASSIUM mmol/L 3 4*  < > 3 9   CHLORIDE mmol/L 106  < > 105   CO2 mmol/L 29  < > 29   BUN mg/dL 11  < > 20   CREATININE mg/dL 0 99  < > 1 41*   CALCIUM mg/dL 8 6  < > 8 0*   TOTAL PROTEIN g/dL  --   --  6 2*   BILIRUBIN TOTAL mg/dL  --   --  0 60   ALK PHOS U/L  --   --  68   ALT U/L  --   --  12   AST U/L  --   --  16   GLUCOSE RANDOM mg/dL 102  < > 127   < > = values in this interval not displayed  Results from last 7 days  Lab Units 06/16/18  0040   INR  1 24*       Imaging: I have personally reviewed pertinent reports  Ct Head Without Contrast    Result Date: 6/14/2018  Narrative: CT BRAIN - WITHOUT CONTRAST INDICATION:   Head trauma and dementia  COMPARISON:  6/13/2018  TECHNIQUE:  CT examination of the brain was performed  In addition to axial images, coronal 2D reformatted images were created and submitted for interpretation  Radiation dose length product (DLP) for this visit:  990 mGy-cm   This examination, like all CT scans performed in the [facility], was performed utilizing techniques to minimize radiation dose exposure, including the use of iterative reconstruction and automated exposure control  IMAGE QUALITY:  Diagnostic   FINDINGS: PARENCHYMA:  Small, chronic left posterior frontal parietal infarct  Periventricular and subcortical hypoattenuating foci, consistent with microangiopathic disease  No acute intracranial hemorrhage or mass effect  VENTRICLES AND EXTRA-AXIAL SPACES:  No hydrocephalus or extra-axial collection  VISUALIZED ORBITS AND PARANASAL SINUSES:  Mucosal thickening in the left maxillary sinus and secretions, consistent with paranasal sinus disease  No retrobulbar hematoma  CALVARIUM AND EXTRACRANIAL SOFT TISSUES:  Normal      Impression: 1  No acute intracranial hemorrhage, mass effect or extra-axial collection  2   Chronic left MCA territory infarct  Microangiopathic disease  Workstation performed: [id number]     Ct Head Without Contrast    Result Date: 6/13/2018  Narrative: CT BRAIN - WITHOUT CONTRAST INDICATION:   fall  Unwitnessed fall  COMPARISON:  Several prior examinations, most recent of which is dated November 19, 2017  TECHNIQUE:  CT examination of the brain was performed  In addition to axial images, coronal 2D reformatted images were created and submitted for interpretation  Radiation dose length product (DLP) for this visit:  908 26 mGy-cm   This examination, like all CT scans performed in the [facility], was performed utilizing techniques to minimize radiation dose exposure, including the use of iterative  reconstruction and automated exposure control  IMAGE QUALITY:  Diagnostic  FINDINGS: PARENCHYMA: Decreased attenuation is noted in periventricular and subcortical white matter demonstrating an appearance that is statistically most likely to represent moderate microangiopathic change  Chronic lacunar infarction(s) are noted in basal ganglia, unchanged from prior exam   Chronic infarction posterior left frontal lobe with encephalomalacia and gliosis  No CT signs of acute infarction  No intracranial mass, mass effect or midline shift  No acute parenchymal hemorrhage     Atherosclerotic vascular calcifications in carotid and vertebral arteries are more advanced than would be expected for the patient's  age  VENTRICLES AND EXTRA-AXIAL SPACES:  Enlargement of ventricles and extra-axial CSF spaces, out of proportion to the patient's age most consistent with cerebral and cerebellar atrophy  VISUALIZED ORBITS AND PARANASAL SINUSES:  No acute abnormality involving the orbits  Moderate scattered sinus mucosal thickening is noted  No fluid levels are seen  CALVARIUM AND EXTRACRANIAL SOFT TISSUES:  Normal      Impression: Cerebral atrophy with chronic small vessel ischemic white matter disease and chronic lacunar infarctions as described above  No acute intracranial abnormality  Workstation performed: [id number]       EKG, Pathology, and Other Studies Reviewed on Admission:    EKbpm, afib    Allscripts / Epic Records Reviewed:  Yes

## 2018-06-16 NOTE — PROGRESS NOTES
Patient to be transferred to [place] with Dr Mónica Macedo as receiving doctor  Call placed and report given to iZoca, receiving nurse on P5, room 525  Heparin drip running at 18 units per Kg per hr in 22 ernesto IV in right hand  Sitter at patient bedside  Will continue to monitor

## 2018-06-16 NOTE — ANESTHESIA PREPROCEDURE EVALUATION
Review of Systems/Medical History  Patient summary reviewed  Chart reviewed  No history of anesthetic complications     Cardiovascular  Exercise tolerance (METS): <4,  Hypertension , Dysrhythmias , atrial fibrillation,    Pulmonary       GI/Hepatic    PUD,             Endo/Other     GYN       Hematology  Anemia ,     Musculoskeletal       Neurology    CVA ,    Psychology         Lab Results   Component Value Date    WBC 10 19 (H) 06/16/2018    HGB 11 7 06/16/2018     06/16/2018     Lab Results   Component Value Date     06/15/2018    K 3 4 (L) 06/15/2018    BUN 11 06/15/2018    CREATININE 0 99 06/15/2018    GLUCOSE 102 06/15/2018     Lab Results   Component Value Date    PTT 98 (H) 06/16/2018      Lab Results   Component Value Date    INR 1 24 (H) 06/16/2018       Blood type A    Lab Results   Component Value Date    HGBA1C 5 7 06/16/2018         Physical Exam    Airway       Dental       Cardiovascular      Pulmonary      Other Findings  Non-compliant with exam      Anesthesia Plan  ASA Score- 3 Emergent    Anesthesia Type- general with ASA Monitors  Additional Monitors:   Airway Plan: ETT  Comment:  OLGA Jon , have personally seen and evaluated the patient prior to anesthetic care  I have reviewed the pre-anesthetic record, and other medical records if appropriate to the anesthetic care  If a CRNA is involved in the case, I have reviewed the CRNA assessment, if present, and agree  Risks/benefits and alternatives discussed with patient including possible PONV, sore throat, and possibility of rare anesthetic and surgical emergencies  I obtained verbal consent from brittany Linda  We specifically discussed suspension of code status in the operating room and he has given consent        Plan Factors-    Induction- intravenous  Postoperative Plan-     Informed Consent- Anesthetic plan and risks discussed with patient

## 2018-06-16 NOTE — DISCHARGE SUMMARY
Discharge- Abdullahi Arce 3/28/1926, 80 y o  female MRN: [id number]    Unit/Bed#: -01 Encounter: [id number]    Primary Care Provider: No primary care provider on file  Date and time admitted to hospital: 6/14/2018  5:14 PM        * Falls   Assessment & Plan    S/P multiple falls pre-hospital   Multifactorial history of dementia, physical deconditioning  Infectious C diff  Fall and safety precaution  Bed alarm  PT/OT, case management for discharge planning  Patient one-to-one secondary dementia and to fall risk  Weakness   Assessment & Plan    S/P fall, likely secondary to physical deconditioning, muscle weakness, Dementia, C diff, vascular disorder  Pt/OT/ Case management for discharge planning  Fall and safety precaution  Bed Alarm  C  difficile diarrhea   Assessment & Plan    Current treatment of Vanco, continue the same  Contact precaution  Monitor for electrolyte imbalance and replete as needed  Alzheimer's dementia with behavioral disturbance   Assessment & Plan    Monitor for safety   Re-orient patient  Patient on one-to-one secondary to aggression, elopement risk, fall and safety  Continue Ativan, Seroquel  Atrial fibrillation [facility])   Assessment & Plan    Chronic, history of  Anticoagulant hold secondary to melena  Continue atenolol  Arterial occlusion   Assessment & Plan    History of, tonight, primary nurse notified provider reporting pulseless, cold to touch, discoloration of right lower extremity  Right discoloration (cyanosis) worsen than left (Pale, cold)  Heparin drip infusing  ultrasound of 6/17 revealed significant left lower extremity arterial occlusive disease  Occlusion versus high grade stenosis noted in the proximal, mid, and distal segment of the superficial femoral artery  Collaterals noted  Diffuse atherosclerotic disease in the vessels below the knee  Ankle/Brachial index: 0 35, which is ischemic range   PVR/ PPG tracings are dampened  Metatarsal pressure of 0 mmHg Great toe pressure of 0 mmHg  Right lower extremity: Diffuse atherosclerotic disease throughout the superficial femoral artery and in the vessels below the knee  Ankle/Brachial not reliable due to not compressible vessels, No prior  PVR/ PPG tracings are dampened  Metatarsal pressure of 69 mmHg Great toe pressure of 94 mmHg, within the healing range  Multiple times to notify family with regards to transfer  No answer  Christiane called both Sommer and Jefferson Ricardo on patient's emergency contact  No answer, left messages, no call back  Heparin drip initiated  Discharging Physician / Practitioner: SEA Ingram  PCP: No primary care provider on file  Admission Date:   Admission Orders     Ordered        06/14/18 2036  Inpatient Admission (expected length of stay for this patient is greater than two midnights)  Once             Discharge Date: 06/16/18    Disposition:      Inpatient [address] to [facility] SNF:   · Not Applicable to this Patient - Not Applicable to this Patient    Reason for Admission:  Weakness, falls    Discharge Diagnoses:     Please see assessment and plan section above for further details regarding discharge diagnoses  Resolved Problems  Date Reviewed: 6/16/2018    None            Consultations During Hospital Stay:  None    Procedures Performed:     · CT of the brain: No acute intracranial hemorrhage, mass effect or extra-axial collection    Chronic left MCA territory infarct   Microangiopathic disease  Diet Recommendations at Discharge:   Diet -        Diet Orders            Start     Ordered    06/14/18 2107  Diet Regular; Regular House  Diet effective now     Question Answer Comment   Diet Type Regular    Regular Regular House    RD to adjust diet per protocol? Yes        06/14/18 2106        Fluid Restriction - No Fluid Restriction at Discharge      Instructions for any Catheters / Lines Present at Discharge (including removal date, if applicable):  Hep-Lock with heparin drip  Significant Findings / Test Results:     · See attached labs, see problem list above  Incidental Findings:   · None    Test Results Pending at Discharge (will require follow up): · None     Outpatient Tests Requested:  · None    Complications:  Left lower extremity pulseless  C diff contact isolation  Dementia with behavior disturbance, one-to-one  Hospital Course:     Shira Morris is a 80 y o  female history of dementia with behavior disturbance, cardiac disease, C diff, patient who originally presented to the hospital on 6/14/2018 due to multiple falls  Patient has been on one-to-one secondary to behavior disturbance, aggression, elopement risk, fall safety  She is currently being treated with p  o  vancomycin for C diff  Please see details above in problem list   Patient has progressive dementia  Is a poor historian is unable to understand plan of care  Family has been notified on multiple occasions  Have left multiple messages for her grandson and for Sommer  No call backs  Transfer initiated  Condition at Discharge: fair     Discharge Day Visit / Exam:     * Please refer to separate progress note for these details *    Goals of Care Discussions:  · Code Status at Discharge: Level 3 - DNAR and DNI  · Were there any Goals of Care Discussions during Hospitalization?: Yes transfer discussed with Dr Mendez vascular on cough, Doctor Juanpablo Ding internal medicine provider on call  · POLST Completed: No     Discharge instructions/Information to patient and family:   See after visit summary section titled Discharge Instructions for information provided to patient and family  Planned Readmission:  Yes     Discharge Statement:  I spent 60 minutes discharging the patient  This time was spent on the day of discharge  I had direct contact with the patient on the day of discharge  Greater than 50% of the total time was spent examining patient, answering all patient questions, arranging and discussing plan of care with patient as well as directly providing post-discharge instructions  Additional time then spent on discharge activities      ** Please Note: This note has been constructed using a voice recognition system **

## 2018-06-16 NOTE — ASSESSMENT & PLAN NOTE
- c/f arterial occlusion given new presentation of cold, pulseless legs R>L  - continue heparin gtt  - vascular consult  - will keep NPO for now pending vascular input

## 2018-06-16 NOTE — PROGRESS NOTES
Patient on bed, during bedside report, on continual observation due to severe confusion  Alert and oriented to self, on room air, lungs revealing a clear sound  Stating she feels pain in right knee  Assessment revealing cold bilateral lower extremities  Unable to feel pulse with doppler  SLIM made aware

## 2018-06-16 NOTE — PLAN OF CARE
Problem: PHYSICAL THERAPY ADULT  Goal: Performs mobility at highest level of function for planned discharge setting  See evaluation for individualized goals  Treatment/Interventions: Functional transfer training, LE strengthening/ROM, Therapeutic exercise, Endurance training, Cognitive reorientation, Patient/family training, Equipment eval/education, Bed mobility  Equipment Recommended: Hassan Shone       See flowsheet documentation for full assessment, interventions and recommendations  Prognosis: Fair  Problem List: Decreased strength, Decreased endurance, Impaired balance, Decreased mobility, Decreased cognition, Decreased safety awareness  Assessment: Pt is a 80 y o  female seen for PT evaluation s/p admit to [facility] on 6/16/2018 w/ Arterial occlusion  Order placed for PT  Comorbidities affecting pt's physical performance at time of assessment listed above  Personal factors affecting pt at time of IE include: advanced age, past experience, inability to perform IADLs, inability to perform ADLs, limited insight into impairments and recent fall(s)  Prior to admission, pt was living at [address] [place] [address] presenting with multiple falls per chart  Pt reports she was ambulating independently without AD at baseline, however pt is a questionable historian  Upon evaluation: Pt requires mod A x1 for bed mobility and mod A x2 for sit to stand  3 standing attempts made, however pt was unable to ambulate or perform SPT due to increased pain in RLE  Pt impulsively sitting down on EOB    (Please find full objective findings from PT assessment regarding body systems outlined above)  Impairments and limitations also listed above, especially due to  weakness, impaired balance, decreased endurance, pain, decreased activity tolerance, decreased safety awareness, impaired judgement, fall risk and decreased cognition  The following objective measures performed on IE also reveal limitations: Barthel Index 25/100   Pt's clinical presentation is currently unstable/unpredictable seen in pt's presentation of decreased cognitive status, decreased safety awareness, requires 1:1, increased pain with mobility (limiting), and high fall risk  Pt to benefit from continued skilled PT tx while in hospital and upon DC to address deficits as defined above and maximize level of functional mobility  From PT/mobility standpoint, recommendation at time of d/c would be inpatient rehab pending progress in order to maximize pt's functional independence and consistency w/ mobility in order to facilitate return to PLOF  Recommend progression of standing trials/transfers, initiation of HEP, and PT to see for ambulation trials  Recommendation: Post acute IP rehab          See flowsheet documentation for full assessment

## 2018-06-16 NOTE — PLAN OF CARE
Problem: OCCUPATIONAL THERAPY ADULT  Goal: Performs self-care activities at highest level of function for planned discharge setting  See evaluation for individualized goals  Treatment Interventions: ADL retraining, Functional transfer training, UE strengthening/ROM, Endurance training, Cognitive reorientation, Patient/family training, Equipment evaluation/education, Compensatory technique education, Continued evaluation, Energy conservation, Activityengagement          See flowsheet documentation for full assessment, interventions and recommendations  Limitation: Decreased ADL status, Decreased UE strength, Decreased Safe judgement during ADL, Decreased cognition, Decreased endurance, Decreased high-level ADLs, Decreased self-care trans  Prognosis: Fair  Assessment: Pt is a 80 y /o female seen for OT eval s/p adm to [facility] as a transfer from [place] w/ concerns for ischemic bilateral legs and multiple falls  Pt is dx'd w/ arterial occlusion  Pt is on a 1:1 for dementia and increased fall risk  Pt has baseline dementia  CT of brain noted chronic MCA territory infarcts 6/14 & 6/13  Comorbidities include a h/o alzheimer's disease, arterial occulsion, afib, benign HTN, cardiac disease, dementia, and renal disorder  Pt with active OT orders and up w/ A  Pt lives at [facility] per chart review  Pt is a poor historian  Pt reports being mainly I w/ ADLS but has assist when needed and needs assist for IADLS, does not drive, & required no use of DME PTA  Pt is currently demonstrating the following occupational deficits: Max A UB ADLS, Total A LB ADLS, Max A x2 transfers, unable to assess functional mobility   Pt with deficits and limitations in all baseline areas of occupation 2* baseline dementia, decreased understanding of deficits and safety awareness, pain, fatigue, decreased balance and strength, decreased forward functional reach, decreased activity tolerance and functional mobility, deconditioned, decreased I w/ ADLS compared to previous level of functioning  The following Occupational Performance Areas to address include: eating, grooming, bathing/shower, toilet hygiene, dressing, socialization, health maintenance, functional mobility, community mobility, clothing management and social participation  Pt scored overall 25/100 on the Barthel Index  Based on the aforementioned OT evaluation, functional performance deficits, and assessments, pt has been identified as a high complexity evaluation  Recommend pt d/c to STR when medically stable    Pt to continue to benefit from acute immediate OT services to address the following goals 3-5x/wk to  w/in 7-10 days:     OT Discharge Recommendation: Short Term Rehab  OT - OK to Discharge: Yes (when medically stable)      Comments: Letha TIPTON, OTR/L

## 2018-06-16 NOTE — ASSESSMENT & PLAN NOTE
Monitor for safety   Re-orient patient  Patient on one-to-one secondary to aggression, elopement risk, fall and safety  Continue Ativan, Seroquel

## 2018-06-16 NOTE — PROGRESS NOTES
Patient found on bed at first round  Patient on continual observation due to severe confusion  Complained of pain in right knee  On assessment, bilateral lower extremities were cold, unable to feel pulse with doppler  SLIM made aware

## 2018-06-16 NOTE — PROGRESS NOTES
Vascular Progress Note:    CTA abdomen pelvis with runoff reviewed  Right common femoral artery occlusion  Bilateral SFA arteries are occluded with what appears to be subacute/acute thrombus  Right popliteal occluded with significant tibioperoneal disease  The left lower extremity appears asymptomatic  As previously noted patient has significant underlying dementia and is an unreliable historian  She does complain of significant right foot pain  She has no motor function of her foot  Her leg is cold from the foot up to her knee  I have discussed the findings of the CT scan with her grandson  I have notified him that her right foot is nonsalvageable due to prolonged ischemia  We discussed that she would  ultimately require a right above knee amputation  In an effort to optimize perfusion and healing of future amputation stump recommend right common femoral thromboembolectomy to restore outflow via the profunda femoris  Patient's grandson [place] verbalized understanding and consent obtained via telephone  Plan:  Right common femoral thromboembolectomy with possible fasciotomies

## 2018-06-16 NOTE — ASSESSMENT & PLAN NOTE
History of, tonight, primary nurse notified provider reporting pulseless, cold, discoloration of left lower extremity  ultrasound of 6/17 revealed significant left lower extremity arterial occlusive disease  Occlusion versus high grade stenosis noted in the proximal, mid, and distal segment of the superficial femoral artery  Collaterals noted  Diffuse atherosclerotic disease in the vessels below the knee  Ankle/Brachial index: 0 35, which is ischemic range  PVR/ PPG tracings are dampened  Metatarsal pressure of 0 mmHg Great toe pressure of 0 mmHg  Right lower extremity: Diffuse atherosclerotic disease throughout the superficial femoral artery and in the vessels below the knee  Ankle/Brachial not reliable due to not compressible vessels, No prior  PVR/ PPG tracings are dampened  Metatarsal pressure of 69 mmHg Great toe pressure of 94 mmHg, within the healing range  Multiple times to notify family with regards to transfer  No answer  Christiane called both Sommer and Meliton Sahu on patient's emergency contact  No answer, left messages, no call back  Heparin drip initiated

## 2018-06-16 NOTE — CONSULTS
Consultation - Vascular Surgery   Shira Morris 80 y o  female MRN: [id number]  Unit/Bed#: [facility] 525-01 Encounter: [id number]    Assessment/Plan     Assessment:  89yF w/ RLE ischemia    L monophasic PT, palpable femoral artery  R no distal signals, palpable femoral    Plan:  Heparin VTE  F/u labs  Continue to try to track down family to discuss goals of care    History of Present Illness     HPI:  Shira Morris is a 80 y o  female who presents with a cold right foot  MHx is HTN, Afib, PAD  She was taken off of anticog for melena  She previously presented in 6/29/17 with a cold, red left foot and was found to have high grade stenosos of L SFA  She underwent Agram and mechanical thrombectomy of L SFA/pop w/ PTA on 6/29/17  Agram showing severe disease of left leg with only small collaterals into foot  She was seen in the office by Dr Mely Jaeger Doctor on 7/20/2017 at which point she was recommended to be on anticoagulation  She was hospitalized for melena 5/27/18 and her Eliquis was held  This was then discontinued as outpatient cardiologist states poor candidate for anticoagulation given bleeding and advanced age with dementia  She now presented to [facility] on 6/14 for multiple falls  She was found to have a cold right foot and was transferred here  6/28/17: R diffuse atherosclerotic disease throughout SFA and vessels below the knee MTP 69   L occlusion vs high grade stenosis in SFA with collaterals   MTP 0           Inpatient consult to Vascular Surgery     Date/Time 6/16/2018 5:17 AM     Performed by  April Danielson by Shawn Petersen              Review of Systems   Unable to perform ROS: Dementia       Historical Information   Past Medical History:   Diagnosis Date    Alzheimer disease     Arterial occlusion     left leg    Atrial fibrillation ([facility] [address] )     Benign hypertension     Cardiac disease     Dementia     Hypertension     Renal disorder      Past Surgical History:   Procedure Laterality Date    ESOPHAGOGASTRODUODENOSCOPY N/A 5/29/2018    Procedure: ESOPHAGOGASTRODUODENOSCOPY (EGD);  pt has cdiff;  Surgeon: Josie Dewitt MD;  Location: [facility]; Service: Gastroenterology     Social History   History   Alcohol Use No     History   Drug Use No     History   Smoking Status    Never Smoker   Smokeless Tobacco    Never Used     Family History: non-contributory    Meds/Allergies   all current active meds have been reviewed, current meds:   Current Facility-Administered Medications   Medication Dose Route Frequency    acetaminophen (TYLENOL) tablet 650 mg  650 mg Oral Q6H PRN    amLODIPine (NORVASC) tablet 10 mg  10 mg Oral Daily    atenolol (TENORMIN) tablet 50 mg  50 mg Oral Daily    cyanocobalamin (VITAMIN B-12) tablet 500 mcg  500 mcg Oral Daily    donepezil (ARICEPT) tablet 5 mg  5 mg Oral HS    heparin (porcine) 25,000 units in 250 mL infusion (premix)  3-30 Units/kg/hr (Order-Specific) Intravenous Titrated    heparin (porcine) injection 2,000 Units  2,000 Units Intravenous PRN    heparin (porcine) injection 4,000 Units  4,000 Units Intravenous PRN    loperamide (IMODIUM) capsule 2 mg  2 mg Oral 4x Daily PRN    LORazepam (ATIVAN) tablet 0 5 mg  0 5 mg Oral Q8H PRN    memantine (NAMENDA) tablet 5 mg  5 mg Oral BID    pantoprazole (PROTONIX) EC tablet 40 mg  40 mg Oral BID AC    QUEtiapine (SEROquel) tablet 25 mg  25 mg Oral HS    saccharomyces boulardii (FLORASTOR) capsule 250 mg  250 mg Oral BID    sucralfate (CARAFATE) oral suspension 1,000 mg  1,000 mg Oral Q6H [facility]    valsartan (DIOVAN) tablet 320 mg  320 mg Oral Daily    vancomycin (VANCOCIN) oral solution 125 mg  125 mg Oral 4x Daily    and PTA meds:   Prior to Admission Medications   Prescriptions Last Dose Informant Patient Reported? Taking?    LORazepam (ATIVAN) 0 5 mg tablet  Outside Facility (Specify) Yes No   Sig: Take 0 5 mg by mouth every 8 (eight) hours as needed for anxiety   QUEtiapine (SEROquel) 25 mg tablet  Outside Facility (Specify) Yes No   Sig: Take by mouth   acetaminophen (TYLENOL) 325 mg tablet  Outside Facility (Specify) Yes No   Sig: Take 650 mg by mouth every 6 (six) hours as needed for mild pain   amLODIPine (NORVASC) 10 mg tablet  Outside Facility (Specify) Yes No   Sig: Take by mouth   atenolol (TENORMIN) 50 mg tablet  Outside Facility (Specify) Yes No   Sig: Take by mouth   donepezil (ARICEPT) 5 mg tablet  Outside Facility (Specify) Yes No   Sig: Take 5 mg by mouth daily at bedtime   loperamide (IMODIUM A-D) 2 MG tablet  Outside Facility (Specify) Yes No   Sig: Take 2 mg by mouth 4 (four) times a day as needed for diarrhea   memantine (NAMENDA) 5 mg tablet  Outside Facility (Specify) Yes No   Sig: Take 5 mg by mouth 2 (two) times a day   mupirocin (BACTROBAN) 2 % ointment  Outside Facility (Specify) Yes No   Sig: Apply topically daily   pantoprazole (PROTONIX) 40 mg tablet  Outside Facility (Specify) No No   Sig: Take 1 tablet (40 mg total) by mouth 2 (two) times a day before meals   saccharomyces boulardii (FLORASTOR) 250 mg capsule  Outside Facility (Specify) No No   Sig: Take 1 capsule (250 mg total) by mouth 2 (two) times a day   sucralfate (CARAFATE) 1 g/10 mL suspension  Outside Facility (Specify) No No   Sig: Take 10 mL (1,000 mg total) by mouth every 6 (six) hours   valsartan (DIOVAN) 320 MG tablet  Outside Facility (Specify) Yes No   Sig: Take by mouth   vancomycin (VANCOCIN) 125 MG capsule   No No   Sig: Take 1 capsule (125 mg total) by mouth 4 (four) times a day for 14 days      Facility-Administered Medications: None     No Known Allergies    Objective   First Vitals:   Blood Pressure: 110/79 (06/16/18 0437)  Pulse: (!) 120 (06/16/18 0437)  SpO2: 96 % (06/16/18 0437)    Current Vitals:   Blood Pressure: 110/79 (06/16/18 0437)  Pulse: (!) 120 (06/16/18 0437)  SpO2: 96 % (06/16/18 0437)    No intake or output data in the 24 hours ending 06/16/18 0517    Invasive Devices     Peripheral Intravenous Line            Peripheral IV 06/14/18 Right Forearm 1 day    Peripheral IV 06/16/18 Right Hand less than 1 day          Drain            Urethral Catheter 16 Fr  1 day                Physical Exam   Constitutional: No distress  HENT:   Head: Normocephalic and atraumatic  Cardiovascular:   Irregularly irregular   Pulmonary/Chest: Effort normal  No respiratory distress  Abdominal: Soft  There is no tenderness  Neurological: She is alert  Skin: No rash noted  She is not diaphoretic  No erythema  Right foot cool, pale  No DP or PT  Palpable femoral  Will not move right ankle and toes  Left foot warm  Dopplerable monophasic PT  Intact motor  Lab Results:   I have personally reviewed pertinent lab results  , CBC:   Lab Results   Component Value Date    WBC 10 19 (H) 06/16/2018    HGB 11 7 06/16/2018    HCT 35 7 06/16/2018    MCV 86 06/16/2018     06/16/2018    MCH 28 3 06/16/2018    MCHC 32 8 06/16/2018    RDW 14 6 06/16/2018    MPV 10 4 06/16/2018   , CMP: No results found for: NA, K, CL, CO2, ANIONGAP, BUN, CREATININE, GLUCOSE, CALCIUM, AST, ALT, ALKPHOS, PROT, ALBUMIN, BILITOT, EGFR, Coagulation:   Lab Results   Component Value Date    INR 1 24 (H) 06/16/2018     Imaging: I have personally reviewed pertinent reports  EKG, Pathology, and Other Studies: I have personally reviewed pertinent reports  Counseling / Coordination of Care  Total floor / unit time spent today 25 minutes  Greater than 50% of total time was spent with the patient and / or family counseling and / or coordination of care  A description of the counseling / coordination of care: 25

## 2018-06-16 NOTE — PROGRESS NOTES
[facility] Progress Note  Patient: Adrianna Drake 80 y o  female   MRN: [id number]  PCP: No primary care provider on file  Unit/Bed#: [facility] 525-01 Encounter: [id number]  Date Of Visit: 06/16/18    Assessment:    Principal Problem:    Arterial occlusion  Active Problems:    Atrial fibrillation (HCC)    Alzheimer's dementia with behavioral disturbance    Physical deconditioning    C  difficile diarrhea    History of ischemic left MCA stroke      Plan:    · Right Lower Extremity Ischemia / PAD -   · Heparin drip  Monitor for any bleeding given GI bleed on 5/27/2018  · Follow up CTA study done today  · Vascular surgery looking to communicate with family regarding goals of care  · Pain Control -   · Will schedule tylenol  · Add low dose oxycodone 2 5 - 5 mg for pain symptoms  · Monitor Pain levels  · Essential Hypertension -   · Continue on Norvasc, Atenolol  Also on valsartan  · Increase atenolol given tachycardia and decrease the valsartan to avoid having BP go too low  · Alzheimer Dementia with Behavioral Disturbances -   · Continue Aricept and Namenda  · For behaviors, continue seroquel qhs and PRN ativan  · Patient is on a 1-1 observation which will be continued  · C-diff Colitis (POA) - continue on oral vancomycin  Due to complete treatment on 6/25/2018  Monitor BMs and hydration status  · History of Stroke -   · Antiplatelet / Anticoagulation - heparin drip  · Statin - None currently  · Atrial Fibrillation -   · Rate control - Increasing atenolol  Continue telemetry monitoring given tachycardia  · Anticoagulation - heparin drip currently  Has had stroke before and also ambulatory dysfunction places a higher risk for bleed  Deemed by outpatient cardiologist to be poor candidate for anticoagulation due to advanced age and bleeding risk (GI bleed, falls, etc)  · GERD - PPI therapy  Monitor for symptoms    · Ambulatory dysfunction - PT / OT evaluations recommend acute inpatient rehab post discharge  VTE Pharmacologic Prophylaxis:   Pharmacologic: Heparin Drip  Mechanical VTE Prophylaxis in Place: Yes    Patient Centered Rounds: I have performed bedside rounds with nursing staff today  Discussions with Specialists or Other Care Team Provider: None    Education and Discussions with Family / Patient: Vascular surgery has discussed plan of care / treatment options with family  Time Spent for Care: 30 minutes  More than 50% of total time spent on counseling and coordination of care as described above  Current Length of Stay: 0 day(s)    Current Patient Status: Inpatient   Certification Statement: The patient will continue to require additional inpatient hospital stay due to evaluation for above medical conditions  Discharge Plan / Estimated Discharge Date: to be determined  Code Status: Level 3 - DNAR and DNI      Subjective: The patient has been having a lot of pain in the right lower extremity which is not helped by tylenol alone  Currently when I see her she is sleeping and does not seem to be in pain, but when lightly palpating the affected leg, she briefly yells with pain and returns to sleep  The patient has no other symptoms that she reports and seems uninterested by my presence, telling me that she is sleeping before closing her eyes again  Patient evaluated mid afternoon  Objective:     Vitals:   Temp (24hrs), Av 6 °F (37 °C), Min:98 6 °F (37 °C), Max:98 6 °F (37 °C)    HR:  [] 104  Resp:  [18] 18  BP: (110-150)/(65-79) 150/65  SpO2:  [94 %-99 %] 99 %  There is no height or weight on file to calculate BMI  Input and Output Summary (last 24 hours): Intake/Output Summary (Last 24 hours) at 18 1526  Last data filed at 18 1443   Gross per 24 hour   Intake                0 ml   Output              575 ml   Net             -575 ml       Physical Exam:     Physical Exam   Constitutional: No distress     Sleeping on my arrival   Goes back to sleep / closes eyes between questions  HENT:   Mouth/Throat: Oropharynx is clear and moist    Eyes: Pupils are equal, round, and reactive to light  Cardiovascular:   No murmur heard  mildly tachycardic rate and irregular rhythm   Pulmonary/Chest: Effort normal  She has no wheezes  She has no rales  Abdominal: Soft  Bowel sounds are normal  She exhibits no distension  There is no tenderness  Musculoskeletal: She exhibits no edema  Mid lower leg and distally is cold, cyanotic and somewhat mottled appearing  No obvious gangrene or open ulcers present  The leg is very tender to palpation  Neurological: She is alert  No cranial nerve deficit  She exhibits normal muscle tone  Skin: Skin is warm and dry  No rash noted  Vitals reviewed  Additional Data:     Labs:      Results from last 7 days  Lab Units 06/16/18  0040 06/15/18  0511   WBC Thousand/uL 10 19* 6 48   HEMOGLOBIN g/dL 11 7 11 5   HEMATOCRIT % 35 7 34 6*   PLATELETS Thousands/uL 292 241   NEUTROS PCT %  --  69   LYMPHS PCT %  --  18   MONOS PCT %  --  10   EOS PCT %  --  1       Results from last 7 days  Lab Units 06/15/18  0511  06/11/18  0135   SODIUM mmol/L 141  < > 140   POTASSIUM mmol/L 3 4*  < > 3 9   CHLORIDE mmol/L 106  < > 105   CO2 mmol/L 29  < > 29   BUN mg/dL 11  < > 20   CREATININE mg/dL 0 99  < > 1 41*   CALCIUM mg/dL 8 6  < > 8 0*   TOTAL PROTEIN g/dL  --   --  6 2*   BILIRUBIN TOTAL mg/dL  --   --  0 60   ALK PHOS U/L  --   --  68   ALT U/L  --   --  12   AST U/L  --   --  16   GLUCOSE RANDOM mg/dL 102  < > 127   < > = values in this interval not displayed  Results from last 7 days  Lab Units 06/16/18  0040   INR  1 24*       * I Have Reviewed All Lab Data Listed Above  * Additional Pertinent Lab Tests Reviewed:  All Labs For Current Hospital Admission Reviewed    Imaging:    Imaging Reports Reviewed Today Include: CT head  Imaging Personally Reviewed by Myself Includes: None    Recent Cultures (last 7 days):       Results from last 7 days  Lab Units 06/11/18  0638   C DIFF TOXIN B  POSITIVE for C difficle toxin by PCR  *       Last 24 Hours Medication List:     Current Facility-Administered Medications:  acetaminophen 650 mg Oral Q6H PRN Jaden Avendano MD    amLODIPine 10 mg Oral Daily Jaden Avendano MD    atenolol 50 mg Oral Daily Jaden Avendano MD    cyanocobalamin 500 mcg Oral Daily Jaden Avendano MD    donepezil 5 mg Oral HS Jaden Avendano MD    heparin (porcine) 3-30 Units/kg/hr (Order-Specific) Intravenous Titrated Jaden Avendano MD Last Rate: 18 Units/kg/hr (06/16/18 1509)   heparin (porcine) 2,000 Units Intravenous PRN Jaden Avendano MD    heparin (porcine) 4,000 Units Intravenous PRN Jaden Avendano MD    loperamide 2 mg Oral 4x Daily PRN Jaden Avendano MD    LORazepam 0 5 mg Oral Q8H PRN Jaden Avendano MD    memantine 5 mg Oral BID Jaden Avendano MD    ondansetron 4 mg Intravenous Q6H PRN Janeth Morrow DO    pantoprazole 40 mg Oral BID AC Jaden Avendano MD    QUEtiapine 25 mg Oral HS Jaden Avendano MD    saccharomyces boulardii 250 mg Oral BID Jaden Avendano MD    sucralfate 1,000 mg Oral Q6H [facility] Jaden Avendano MD    valsartan 320 mg Oral Daily Jaden Avendano MD    vancomycin 125 mg Oral 4x Daily Jaden Avendano MD         Today, Patient Was Seen By: Janeth Morrow DO    ** Please Note: This note has been constructed using a voice recognition system   **

## 2018-06-16 NOTE — OCCUPATIONAL THERAPY NOTE
[address]  Evaluation     Patient Name: Sandi Kevin  [id number] Date: 6/16/2018  Problem List  Patient Active Problem List   Diagnosis    Arterial occlusion    [facility]    Noncompliance with treatment    Alzheimer's dementia with behavioral disturbance    Falls    Physical deconditioning    [place] (hard of hearing)    Melena    C  difficile diarrhea    Anemia    GI bleed    Hypertension    CKD (chronic kidney disease) stage 3, GFR 30-59 ml/min    GERD with esophagitis    PUD (peptic ulcer disease)    Weakness    History of ischemic left MCA stroke     Past Medical History  Past Medical History:   Diagnosis Date    Alzheimer disease     Arterial occlusion     left leg    [facility] ([facility]     Benign hypertension     Cardiac disease     Dementia     Hypertension     Renal disorder      Past Surgical History  Past Surgical History:   Procedure Laterality Date    ESOPHAGOGASTRODUODENOSCOPY N/A 5/29/2018    Procedure: ESOPHAGOGASTRODUODENOSCOPY (EGD);  pt has cdiff;  Surgeon: Suman Garcia MD;  Location: [facility]; Service: Gastroenterology           06/16/18 0830   Note Type   Note type Eval/Treat   Restrictions/Precautions   Weight Bearing Precautions Per Order No   Other Precautions Contact/isolation;Cognitive;1:1;Multiple lines; Fall Risk;Pain;Hard of hearing   Pain Assessment   Pain Assessment FLACC   Pain Location Back;Leg   Pain Orientation Right; Lower  (R leg, mid/lower back)   Pain Rating: FLACC (Rest) - Face 0   Pain Rating: FLACC (Rest) - Legs 0   Pain Rating: FLACC (Rest) - Activity 0   Pain Rating: FLACC (Rest) - Cry 0   Pain Rating: FLACC (Rest) - Consolability 0   Score: FLACC (Rest) 0   Pain Rating: FLACC (Activity) - Face 1   Pain Rating: FLACC (Activity) - Legs 0   Pain Rating: FLACC (Activity) - Activity 0   Pain Rating: FLACC (Activity) - Cry 1   Pain Rating: FLACC (Activity) - Consolability 1   Score: FLACC (Activity) 3   Home Living Type of Home Assisted living   Home Layout One level   Bathroom Shower/Tub (unable to report)   Bathroom Toilet (unable to report)   Home Equipment Other (Comment)  (denies any DME)   Additional Comments Pt unable to provide social hx 2* to dementia/poor historian  Per chart review pt lived at [address]; 1 level  Prior Function   Level of [place] Needs assistance with ADLs and functional mobility; Needs assistance with IADLs   Lives With Facility staff   Receives Help From Other (Comment)  (facility staff)   ADL Assistance Needs assistance   IADLs Needs assistance   Falls in the last 6 months 1 to 4  (may be more; chart review reports multiple)   Vocational Retired   Comments Pt reports being mainly I w/ ADLS but facility staff assists if needed, needs A for IADLS, and pt reports being I w/ transfers and functional mobility  Lifestyle   Autonomy A for ADLS, IADLS, I w/ transfers and functional mobility- per chart review and pt report   Reciprocal Relationships Pt lives w/ facility staff at [facility] [place]- per chart review   Service to Others Pt does not work   Intrinsic Gratification Pt unable to report enjoyable activities; reports walking   Psychosocial   Psychosocial (WDL) WDL   ADL   Eating Assistance 3  Moderate Assistance   Grooming Assistance 2  Maximal Assistance   UB Bathing Assistance 2  Maximal Assistance   LB Bathing Assistance 1  Total Assistance   [address] 2  Maximal Assistance   LB Dressing Assistance 1  Total Assistance   LB Dressing Deficit Don/doff R sock; Don/doff L sock; Supervision/safety; Increased time to complete   Toileting Assistance  2  Maximal Assistance   Toileting Deficit Other (Comment)  ([place])   Functional Assistance 2  Maximal Assistance   Functional Deficit Increased time to complete;Supervision/safety;Steadying;Setup   Bed Mobility   Supine to Sit 3  Moderate assistance   Additional items Assist x 1; Increased time required;Verbal cues;LE management;HOB elevated; Bedrails Sit to Supine 3  Moderate assistance   Additional items Assist x 1; Increased time required;Verbal cues;LE management;HOB elevated; Bedrails   Additional Comments Pt went from supine to sit w/ Mod A x1 for LE managment, UB control, HOB elevated, and VC for proper technique to initiate movements and for cognitive assistance  Pt able to sit EOB w/ CTG   Transfers   Sit to Stand 2  Maximal assistance   Additional items Assist x 2; Increased time required;Verbal cues   Stand to Sit 2  Maximal assistance   Additional items Assist x 2; Increased time required;Verbal cues   Additional Comments Pt performed 3 trials of sit-stand from EOB w/ Max A x2 for heavy force production into standing, VC for proper technique for hand placement on RW and for steadying balance/safety  Pt unable to maintain stand of engage in functional activity 2* to complaints of pain in RLE and lower pain  Functional Mobility   Additional Comments Unable to assess 2* to pt reporting pain in RLE/back and not able to maintain stand/engage in functional mobility  Balance   Static Sitting Fair   Dynamic Sitting Poor +   Static Standing Poor   Activity Tolerance   Activity Tolerance Patient limited by pain; Patient limited by fatigue;Treatment limited secondary to medical complications (Comment)   Medical Staff Made Aware PTDominguez   Nurse Made Aware yes   RUE Assessment   RUE Assessment WFL   LUE Assessment   LUE Assessment WFL   Hand Function   Gross Motor Coordination Impaired   Fine Motor Coordination Functional   Sensation   Light Touch No apparent deficits   Cognition   Overall Cognitive Status Impaired   Arousal/Participation Responsive; Cooperative   Attention Attends with cues to redirect   Orientation Level Oriented to person;Disoriented to place; Disoriented to time;Disoriented to situation   Memory Decreased recall of precautions;Decreased recall of recent events;Decreased short term memory   Following Commands Follows one step commands with increased time or repetition   Comments Pt is pleasant and cooperative; has baseline dementia  Confused and requires cognitive assist and 1 step commands w/ repetitive VC for safety and to initiate tasks  Assessment   Limitation Decreased ADL status; Decreased UE strength;Decreased Safe judgement during ADL;Decreased cognition;Decreased endurance;Decreased high-level ADLs; Decreased self-care trans   Prognosis Fair   Assessment Pt is a 80 y /o female seen for OT eval s/p adm to B as a transfer from [facility] w/ concerns for ischemic bilateral legs and multiple falls  Pt is dx'd w/ arterial occlusion  Pt is on a 1:1 for dementia and increased fall risk  Pt has baseline dementia  CT of brain noted chronic MCA territory infarcts 6/14 & 6/13  Comorbidities include a h/o alzheimer's disease, arterial occulsion, afib, benign HTN, cardiac disease, dementia, and renal disorder  Pt with active OT orders and up w/ A  Pt lives at [address] per chart review  Pt is a poor historian  Pt reports being mainly I w/ ADLS but has assist when needed and needs assist for IADLS, does not drive, & required no use of DME PTA  Pt is currently demonstrating the following occupational deficits: Max A UB ADLS, Total A LB ADLS, Max A x2 transfers, unable to assess functional mobility  Pt with deficits and limitations in all baseline areas of occupation 2* baseline dementia, decreased understanding of deficits and safety awareness, pain, fatigue, decreased balance and strength, decreased forward functional reach, decreased activity tolerance and functional mobility, deconditioned, decreased I w/ ADLS compared to previous level of functioning  The following Occupational Performance Areas to address include: eating, grooming, bathing/shower, toilet hygiene, dressing, socialization, health maintenance, functional mobility, community mobility, clothing management and social participation  Pt scored overall 25/100 on the Barthel Index   Based on the aforementioned OT evaluation, functional performance deficits, and assessments, pt has been identified as a high complexity evaluation  Recommend pt d/c to STR when medically stable   Pt to continue to benefit from acute immediate OT services to address the following goals 3-5x/wk to  w/in 7-10 days:   Goals   Patient Goals to go to the bathroom   LTG Time Frame 7-10   Long Term Goal #1 see below listed goals   Plan   Treatment Interventions ADL retraining;Functional transfer training;UE strengthening/ROM; Endurance training;Cognitive reorientation;Patient/family training;Equipment evaluation/education; Compensatory technique education;Continued evaluation; Energy conservation; Activityengagement   Goal Expiration Date 18   OT Frequency 3-5x/wk   Recommendation   OT Discharge Recommendation Short Term Rehab   OT - OK to Discharge Yes  (when medically stable)   Barthel Index   Feeding 5   Bathing 0   Grooming Score 0   Dressing Score 5   Bladder Score 0   Bowels Score 5   Toilet Use Score 5   Transfers (Bed/Chair) Score 5   Mobility (Level Surface) Score 0   Stairs Score 0   Barthel Index Score 25   Modified [place] Scale   Modified [place] Scale 4     GOALS    1) Pt will improve activity tolerance to G for min 30 min txment sessions    2) Pt will complete UB/LB dressing/self care w/ Min A using adaptive device and DME as needed    3) Pt will complete bathing w/ Min A w/ use of AE and DME as needed    4) Pt will complete toileting w/ Min A w/ G hygiene/thoroughness using DME as needed    5) Pt will improve functional transfers to Min A on/off all surfaces using DME as needed w/ G balance/safety     6) Pt will improve functional mobility during ADL/IADL/leisure tasks to Min A using DME as needed w/ G balance/safety     7) Pt will follow simple 1 step commands 75% of the time during functional ADL/IADL/leisure tasks        [place], OTR/L

## 2018-06-16 NOTE — ANESTHESIA PROCEDURE NOTES
Arterial Line Insertion  Date/Time: 6/16/2018 7:37 PM  Performed by: Chilo Carranza by: Crist Libman   Consent: Verbal consent obtained  Risks and benefits: risks, benefits and alternatives were discussed  Consent given by: power of   Patient understanding: patient states understanding of the procedure being performed (POA)  Patient consent: the patient's understanding of the procedure matches consent given (POA)  Procedure consent: procedure consent matches procedure scheduled  Relevant documents: relevant documents present and verified  Test results: test results available and properly labeled  Site marked: the operative site was marked  Radiology Images: Radiology Images displayed and confirmed  If images not available, report reviewed  Required items: required blood products, implants, devices, and special equipment available  Patient identity confirmed: verbally with patient, arm band and hospital-assigned identification number  Time out: Immediately prior to procedure a "time out" was called to verify the correct patient, procedure, equipment, support staff and site/side marked as required  Preparation: Patient was prepped and draped in the usual sterile fashion    Indications: hemodynamic monitoring  Orientation:  Right  Location: radial artery  Sedation:  Patient sedated: GA     Procedure Details:  Chilo's test normal: yes  Needle gauge: 20  Number of attempts: 1    Post-procedure:  Post-procedure: dressing applied  Waveform: good waveform  Post-procedure CNS: normal  Patient tolerance: Patient tolerated the procedure well with no immediate complications

## 2018-06-16 NOTE — ASSESSMENT & PLAN NOTE
S/P multiple falls pre-hospital   Multifactorial history of dementia, physical deconditioning  Infectious C diff  Fall and safety precaution  Bed alarm  PT/OT, case management for discharge planning  Patient one-to-one secondary dementia and to fall risk

## 2018-06-16 NOTE — PLAN OF CARE
Problem: Potential for Falls  Goal: Patient will remain free of falls  INTERVENTIONS:  - Assess patient frequently for physical needs  -  Identify cognitive and physical deficits and behaviors that affect risk of falls    -  [place] fall precautions as indicated by assessment   - Educate patient/family on patient safety including physical limitations  - Instruct patient to call for assistance with activity based on assessment  - Modify environment to reduce risk of injury  - Consider OT/PT consult to assist with strengthening/mobility   Outcome: Progressing      Problem: Prexisting or High Potential for Compromised Skin Integrity  Goal: Skin integrity is maintained or improved  INTERVENTIONS:  - Identify patients at risk for skin breakdown  - Assess and monitor skin integrity  - Assess and monitor nutrition and hydration status  - Monitor labs (i e  albumin)  - Assess for incontinence   - Turn and reposition patient  - Assist with mobility/ambulation  - Relieve pressure over bony prominences  - Avoid friction and shearing  - Provide appropriate hygiene as needed including keeping skin clean and dry  - Evaluate need for skin moisturizer/barrier cream  - Collaborate with interdisciplinary team (i e  Nutrition, Rehabilitation, etc )   - Patient/family teaching   Outcome: Progressing      Problem: PAIN - ADULT  Goal: Verbalizes/displays adequate comfort level or baseline comfort level  Interventions:  - Encourage patient to monitor pain and request assistance  - Assess pain using appropriate pain scale  - Administer analgesics based on type and severity of pain and evaluate response  - Implement non-pharmacological measures as appropriate and evaluate response  - Consider cultural and social influences on pain and pain management  - Notify physician/advanced practitioner if interventions unsuccessful or patient reports new pain  Outcome: Progressing      Problem: SAFETY ADULT  Goal: Maintain or return to baseline ADL function  INTERVENTIONS:  -  Assess patient's ability to carry out ADLs; assess patient's baseline for ADL function and identify physical deficits which impact ability to perform ADLs (bathing, care of mouth/teeth, toileting, grooming, dressing, etc )  - Assess/evaluate cause of self-care deficits   - Assess range of motion  - Assess patient's mobility; develop plan if impaired  - Assess patient's need for assistive devices and provide as appropriate  - Encourage maximum independence but intervene and supervise when necessary  ¯ Involve family in performance of ADLs  ¯ Assess for home care needs following discharge   ¯ Request OT consult to assist with ADL evaluation and planning for discharge  ¯ Provide patient education as appropriate  Outcome: Progressing    Goal: Maintain or return mobility status to optimal level  INTERVENTIONS:  - Assess patient's baseline mobility status (ambulation, transfers, stairs, etc )    - Identify cognitive and physical deficits and behaviors that affect mobility  - Identify mobility aids required to assist with transfers and/or ambulation (gait belt, sit-to-stand, lift, walker, cane, etc )  - [place] fall precautions as indicated by assessment  - Record patient progress and toleration of activity level on Mobility SBAR; progress patient to next Phase/Stage  - Instruct patient to call for assistance with activity based on assessment  - Request Rehabilitation consult to assist with strengthening/weightbearing, etc   Outcome: Progressing      Problem: Knowledge Deficit  Goal: Patient/family/caregiver demonstrates understanding of disease process, treatment plan, medications, and discharge instructions  Complete learning assessment and assess knowledge base    Interventions:  - Provide teaching at level of understanding  - Provide teaching via preferred learning methods  Outcome: Progressing      Problem: SKIN/TISSUE INTEGRITY - ADULT  Goal: Skin integrity remains intact  INTERVENTIONS  - Identify patients at risk for skin breakdown  - Assess and monitor skin integrity  - Assess and monitor nutrition and hydration status  - Monitor labs (i e  albumin)  - Assess for incontinence   - Turn and reposition patient  - Assist with mobility/ambulation  - Relieve pressure over bony prominences  - Avoid friction and shearing  - Provide appropriate hygiene as needed including keeping skin clean and dry  - Evaluate need for skin moisturizer/barrier cream  - Collaborate with interdisciplinary team (i e  Nutrition, Rehabilitation, etc )   - Patient/family teaching  Outcome: Progressing      Problem: MUSCULOSKELETAL - ADULT  Goal: Maintain or return mobility to safest level of function  INTERVENTIONS:  - Assess patient's ability to carry out ADLs; assess patient's baseline for ADL function and identify physical deficits which impact ability to perform ADLs (bathing, care of mouth/teeth, toileting, grooming, dressing, etc )  - Assess/evaluate cause of self-care deficits   - Assess range of motion  - Assess patient's mobility; develop plan if impaired  - Assess patient's need for assistive devices and provide as appropriate  - Encourage maximum independence but intervene and supervise when necessary  - Involve family in performance of ADLs  - Assess for home care needs following discharge   - Request OT consult to assist with ADL evaluation and planning for discharge  - Provide patient education as appropriate  Outcome: Progressing    Goal: Maintain proper alignment of affected body part  INTERVENTIONS:  - Support, maintain and protect limb and body alignment  - Provide pt/fam with appropriate education  Outcome: Progressing

## 2018-06-16 NOTE — ASSESSMENT & PLAN NOTE
- Chronic MCA territory infarcts  - noted on CT brain from 6/14 and 6/13  - has known afib  - check LDL and A1c

## 2018-06-17 LAB
ANION GAP SERPL CALCULATED.3IONS-SCNC: 10 MMOL/L (ref 4–13)
APTT PPP: 38 SECONDS (ref 24–36)
APTT PPP: 58 SECONDS (ref 24–36)
APTT PPP: 92 SECONDS (ref 24–36)
BASE EXCESS BLDA CALC-SCNC: -1 MMOL/L (ref -2–3)
BASOPHILS # BLD AUTO: 0.02 THOUSANDS/ΜL (ref 0–0.1)
BASOPHILS NFR BLD AUTO: 0 % (ref 0–1)
BUN SERPL-MCNC: 17 MG/DL (ref 5–25)
CA-I BLD-SCNC: 1 MMOL/L (ref 1.12–1.32)
CALCIUM SERPL-MCNC: 7.3 MG/DL (ref 8.3–10.1)
CHLORIDE SERPL-SCNC: 107 MMOL/L (ref 100–108)
CO2 SERPL-SCNC: 22 MMOL/L (ref 21–32)
CREAT SERPL-MCNC: 1.2 MG/DL (ref 0.6–1.3)
EOSINOPHIL # BLD AUTO: 0 THOUSAND/ΜL (ref 0–0.61)
EOSINOPHIL NFR BLD AUTO: 0 % (ref 0–6)
ERYTHROCYTE [DISTWIDTH] IN BLOOD BY AUTOMATED COUNT: 14.9 % (ref 11.6–15.1)
GFR SERPL CREATININE-BSD FRML MDRD: 39 ML/MIN/1.73SQ M
GLUCOSE SERPL-MCNC: 105 MG/DL (ref 65–140)
GLUCOSE SERPL-MCNC: 130 MG/DL (ref 65–140)
HCO3 BLDA-SCNC: 24.4 MMOL/L (ref 24–30)
HCT VFR BLD AUTO: 35.4 % (ref 34.8–46.1)
HCT VFR BLD CALC: 29 % (ref 34.8–46.1)
HGB BLD-MCNC: 11.4 G/DL (ref 11.5–15.4)
HGB BLDA-MCNC: 9.9 G/DL (ref 11.5–15.4)
IMM GRANULOCYTES # BLD AUTO: 0.11 THOUSAND/UL (ref 0–0.2)
IMM GRANULOCYTES NFR BLD AUTO: 1 % (ref 0–2)
KCT BLD-ACNC: 218 SEC (ref 89–137)
LYMPHOCYTES # BLD AUTO: 0.56 THOUSANDS/ΜL (ref 0.6–4.47)
LYMPHOCYTES NFR BLD AUTO: 4 % (ref 14–44)
MAGNESIUM SERPL-MCNC: 2.3 MG/DL (ref 1.6–2.6)
MCH RBC QN AUTO: 28.5 PG (ref 26.8–34.3)
MCHC RBC AUTO-ENTMCNC: 32.2 G/DL (ref 31.4–37.4)
MCV RBC AUTO: 89 FL (ref 82–98)
MONOCYTES # BLD AUTO: 0.61 THOUSAND/ΜL (ref 0.17–1.22)
MONOCYTES NFR BLD AUTO: 4 % (ref 4–12)
NEUTROPHILS # BLD AUTO: 14.26 THOUSANDS/ΜL (ref 1.85–7.62)
NEUTS SEG NFR BLD AUTO: 91 % (ref 43–75)
NRBC BLD AUTO-RTO: 0 /100 WBCS
PCO2 BLD: 26 MMOL/L (ref 21–32)
PCO2 BLD: 40.8 MM HG (ref 42–50)
PH BLD: 7.38 [PH] (ref 7.3–7.4)
PLATELET # BLD AUTO: 328 THOUSANDS/UL (ref 149–390)
PMV BLD AUTO: 10.5 FL (ref 8.9–12.7)
PO2 BLD: 226 MM HG (ref 35–45)
POTASSIUM BLD-SCNC: 3.2 MMOL/L (ref 3.5–5.3)
POTASSIUM SERPL-SCNC: 4.2 MMOL/L (ref 3.5–5.3)
RBC # BLD AUTO: 4 MILLION/UL (ref 3.81–5.12)
SAO2 % BLD FROM PO2: 100 % (ref 95–98)
SODIUM BLD-SCNC: 142 MMOL/L (ref 136–145)
SODIUM SERPL-SCNC: 139 MMOL/L (ref 136–145)
SPECIMEN SOURCE: ABNORMAL
SPECIMEN SOURCE: ABNORMAL
WBC # BLD AUTO: 15.56 THOUSAND/UL (ref 4.31–10.16)

## 2018-06-17 PROCEDURE — 99024 POSTOP FOLLOW-UP VISIT: CPT | Performed by: SURGERY

## 2018-06-17 PROCEDURE — 83735 ASSAY OF MAGNESIUM: CPT | Performed by: SURGERY

## 2018-06-17 PROCEDURE — 85025 COMPLETE CBC W/AUTO DIFF WBC: CPT | Performed by: SURGERY

## 2018-06-17 PROCEDURE — 99232 SBSQ HOSP IP/OBS MODERATE 35: CPT | Performed by: INTERNAL MEDICINE

## 2018-06-17 PROCEDURE — 85730 THROMBOPLASTIN TIME PARTIAL: CPT | Performed by: SURGERY

## 2018-06-17 PROCEDURE — 80048 BASIC METABOLIC PNL TOTAL CA: CPT | Performed by: SURGERY

## 2018-06-17 RX ORDER — AMLODIPINE BESYLATE 5 MG/1
5 TABLET ORAL DAILY
Status: DISCONTINUED | OUTPATIENT
Start: 2018-06-18 | End: 2018-06-18

## 2018-06-17 RX ADMIN — DONEPEZIL HYDROCHLORIDE 5 MG: 5 TABLET, FILM COATED ORAL at 21:29

## 2018-06-17 RX ADMIN — VANCOMYCIN HYDROCHLORIDE 125 MG: 10 INJECTION, POWDER, LYOPHILIZED, FOR SOLUTION INTRAVENOUS at 12:03

## 2018-06-17 RX ADMIN — MEMANTINE HYDROCHLORIDE 5 MG: 5 TABLET ORAL at 08:14

## 2018-06-17 RX ADMIN — HEPARIN SODIUM 2000 UNITS: 1000 INJECTION, SOLUTION INTRAVENOUS; SUBCUTANEOUS at 10:19

## 2018-06-17 RX ADMIN — SODIUM CHLORIDE 100 ML/HR: 0.9 INJECTION, SOLUTION INTRAVENOUS at 15:39

## 2018-06-17 RX ADMIN — HEPARIN SODIUM 20 UNITS/KG/HR: 10000 INJECTION, SOLUTION INTRAVENOUS at 15:55

## 2018-06-17 RX ADMIN — VANCOMYCIN HYDROCHLORIDE 125 MG: 10 INJECTION, POWDER, LYOPHILIZED, FOR SOLUTION INTRAVENOUS at 17:43

## 2018-06-17 RX ADMIN — SUCRALFATE 1000 MG: 1 SUSPENSION ORAL at 05:06

## 2018-06-17 RX ADMIN — QUETIAPINE FUMARATE 25 MG: 25 TABLET, FILM COATED ORAL at 21:29

## 2018-06-17 RX ADMIN — VANCOMYCIN HYDROCHLORIDE 125 MG: 10 INJECTION, POWDER, LYOPHILIZED, FOR SOLUTION INTRAVENOUS at 21:30

## 2018-06-17 RX ADMIN — CYANOCOBALAMIN TAB 500 MCG 500 MCG: 500 TAB at 08:14

## 2018-06-17 RX ADMIN — OXYCODONE HYDROCHLORIDE 5 MG: 5 TABLET ORAL at 16:16

## 2018-06-17 RX ADMIN — OXYCODONE HYDROCHLORIDE 5 MG: 5 TABLET ORAL at 12:03

## 2018-06-17 RX ADMIN — VANCOMYCIN HYDROCHLORIDE 125 MG: 10 INJECTION, POWDER, LYOPHILIZED, FOR SOLUTION INTRAVENOUS at 08:25

## 2018-06-17 RX ADMIN — SUCRALFATE 1000 MG: 1 SUSPENSION ORAL at 12:03

## 2018-06-17 RX ADMIN — HEPARIN SODIUM 4000 UNITS: 1000 INJECTION, SOLUTION INTRAVENOUS; SUBCUTANEOUS at 18:01

## 2018-06-17 RX ADMIN — OXYCODONE HYDROCHLORIDE 5 MG: 5 TABLET ORAL at 04:02

## 2018-06-17 RX ADMIN — SUCRALFATE 1000 MG: 1 SUSPENSION ORAL at 17:43

## 2018-06-17 RX ADMIN — LORAZEPAM 0.5 MG: 0.5 TABLET ORAL at 21:31

## 2018-06-17 RX ADMIN — Medication 250 MG: at 08:15

## 2018-06-17 RX ADMIN — MEMANTINE HYDROCHLORIDE 5 MG: 5 TABLET ORAL at 17:43

## 2018-06-17 RX ADMIN — PANTOPRAZOLE SODIUM 40 MG: 40 TABLET, DELAYED RELEASE ORAL at 05:06

## 2018-06-17 RX ADMIN — OXYCODONE HYDROCHLORIDE 5 MG: 5 TABLET ORAL at 20:18

## 2018-06-17 RX ADMIN — PANTOPRAZOLE SODIUM 40 MG: 40 TABLET, DELAYED RELEASE ORAL at 15:55

## 2018-06-17 RX ADMIN — Medication 250 MG: at 17:43

## 2018-06-17 RX ADMIN — OXYCODONE HYDROCHLORIDE 5 MG: 5 TABLET ORAL at 08:14

## 2018-06-17 NOTE — PROGRESS NOTES
[facility] Progress Note  Patient: Montez Luisr 80 y o  female   MRN: [id number]  PCP: No primary care provider on file  [facility] Encounter: [id number]  Date Of Visit: 06/17/18    Assessment:    Principal Problem:    Arterial occlusion  Active Problems:    Atrial fibrillation (HCC)    Alzheimer's dementia with behavioral disturbance    Physical deconditioning    C  difficile diarrhea    History of ischemic left MCA stroke      Plan:    · Right Lower Extremity Ischemia / PAD -   · Heparin drip  Monitor for any bleeding given GI bleed on 5/27/2018  · Follow up CTA study done 6/16/2018  · Vascular surgery awaiting demarcation of RLE for possible amputation in near future if family / patient agrees  · Vascular surgery looking to communicate with family regarding goals of care  · Pain Control -   · Scheduled tylenol  · Low dose oxycodone 2 5 - 5 mg for pain symptoms  · Monitor Pain levels  · Essential Hypertension -   · Valsartan stopped yesterday  Maintain current blood pressure medication regimen except that we will scale back amlodipine to 5 mg daily  Monitor blood pressures  · Alzheimer Dementia with Behavioral Disturbances -   · Continue Aricept and Namenda  · For behaviors, continue seroquel qhs and PRN ativan  · Patient is on a 1-1 observation which will be continued  · C-diff Colitis (POA) - continue on oral vancomycin  Due to complete treatment on 6/25/2018  Monitor BMs and hydration status  · History of Stroke -   · Antiplatelet / Anticoagulation - heparin drip  · Statin - None currently  · Atrial Fibrillation -   · Rate control - Increasing atenolol  Continue telemetry monitoring given tachycardia  · Anticoagulation - heparin drip currently  Has had stroke before and also ambulatory dysfunction places a higher risk for bleed    Deemed by outpatient cardiologist to be poor candidate for anticoagulation due to advanced age and bleeding risk (GI bleed, falls, etc)  · GERD - PPI therapy  Monitor for symptoms  · Ambulatory dysfunction - PT / OT evaluations recommend acute inpatient rehab post discharge  · Goals of Care - discussed goals of care with Keegan soto, on phone today in light of the limb ischemia in patient with known advanced dementia and advanced age  Options of surgery followed by rehab and wound care course vs  No surgery / Palliative care given  He is unsure of which way to proceed and asks to be able to wait a week till he is back in town and able to discuss with her before coming up with a decision  While I expressed to him that we are not so urgently looking for that decision today, that the longer we wait for a decision (if surgery is decided upon) then the more damage that is likely to occur  We will talk to him again tomorrow  VTE Pharmacologic Prophylaxis:   Pharmacologic: Heparin Drip  Mechanical VTE Prophylaxis in Place: Yes    Patient Centered Rounds: I have performed bedside rounds with nursing staff today  Discussions with Specialists or Other Care Team Provider: None    Education and Discussions with Family / Patient: Updated Keegan Oh  Main contact is Keegan soto, at   Time Spent for Care: 30 minutes  More than 50% of total time spent on counseling and coordination of care as described above  Current Length of Stay: 1 day(s)    Current Patient Status: Inpatient   Certification Statement: The patient will continue to require additional inpatient hospital stay due to evaluation for above medical conditions  Discharge Plan / Estimated Discharge Date: to be determined  Code Status: Level 3 - DNAR and DNI      Subjective: The patient is impulsive looking to get out of bed but is redirectable by the care aide present at bedside  The patient is not complaining of pain in the leg as much today  She denies any other nausea, abdominal pain, or sensory deficits    The patient has no headaches or dizziness  Objective:     Vitals:   Temp (24hrs), Av 8 °F (36 6 °C), Min:97 3 °F (36 3 °C), Max:98 8 °F (37 1 °C)    HR:  [] 97  Resp:  [18-23] 20  BP: ()/(44-88) 154/88  SpO2:  [96 %-100 %] 96 %  Body mass index is 20 06 kg/m²  Input and Output Summary (last 24 hours): Intake/Output Summary (Last 24 hours) at 18 1245  Last data filed at 18 1100   Gross per 24 hour   Intake             2450 ml   Output             1200 ml   Net             1250 ml       Physical Exam:     Physical Exam   Constitutional: No distress  Wide awake and trying to continually get up out of bed today  HENT:   Mouth/Throat: Oropharynx is clear and moist    Eyes: Pupils are equal, round, and reactive to light  Cardiovascular: Normal rate  No murmur heard  Irregular rhythm   Pulmonary/Chest: Effort normal  She has no wheezes  She has no rales  Abdominal: Soft  Bowel sounds are normal  She exhibits no distension  There is no tenderness  Musculoskeletal: She exhibits no edema  Mid lower leg and distally is cold to touch with some milder cyanosis present  No ulceration present currently  Less tender to palpation today  Neurological: She is alert  No cranial nerve deficit  She exhibits normal muscle tone  Motor 5/5 to all extremities  Sensation grossly intact  The patient is impulsive  The patient follows very simple commands  Skin: Skin is warm and dry  Vitals reviewed        Additional Data:     Labs:      Results from last 7 days  Lab Units 18  0437   WBC Thousand/uL 15 56*   HEMOGLOBIN g/dL 11 4*   HEMATOCRIT % 35 4   PLATELETS Thousands/uL 328   NEUTROS PCT % 91*   LYMPHS PCT % 4*   MONOS PCT % 4   EOS PCT % 0       Results from last 7 days  Lab Units 18  0437  18  0135   SODIUM mmol/L 139  < > 140   POTASSIUM mmol/L 4 2  < > 3 9   CHLORIDE mmol/L 107  < > 105   CO2 mmol/L 22  < > 29   BUN mg/dL 17  < > 20   CREATININE mg/dL 1 20  < > 1 41*   CALCIUM mg/dL 7 3*  < > 8 0*   TOTAL PROTEIN g/dL  --   --  6 2*   BILIRUBIN TOTAL mg/dL  --   --  0 60   ALK PHOS U/L  --   --  68   ALT U/L  --   --  12   AST U/L  --   --  16   GLUCOSE RANDOM mg/dL 130  < > 127   GLUCOSE, ISTAT   --   < >  --    < > = values in this interval not displayed  Results from last 7 days  Lab Units 06/16/18  0040   INR  1 24*       * I Have Reviewed All Lab Data Listed Above  * Additional Pertinent Lab Tests Reviewed: All Labs Within Last 24 Hours Reviewed    Imaging:    Imaging Reports Reviewed Today Include: No new imaging reports yet available  Imaging Personally Reviewed by Myself Includes: None    Recent Cultures (last 7 days):       Results from last 7 days  Lab Units 06/11/18  0638   C DIFF TOXIN B  POSITIVE for C difficle toxin by PCR  *       Last 24 Hours Medication List:     Current Facility-Administered Medications:  amLODIPine 10 mg Oral Daily Devi Garza MD    atenolol 100 mg Oral Daily Carrie [place], DO    cyanocobalamin 500 mcg Oral Daily Devi Garza MD    donepezil 5 mg Oral HS Devi Garza MD    heparin (porcine) 3-30 Units/kg/hr (Order-Specific) Intravenous Titrated Devi Garza MD Last Rate: 20 Units/kg/hr (06/17/18 1007)   heparin (porcine) 2,000 Units Intravenous PRN Devi Garza MD    heparin (porcine) 4,000 Units Intravenous PRN Devi Garza MD    lactated ringers 20 mL/hr Intravenous Continuous Edy Diaz MD Last Rate: Stopped (06/16/18 6341)   loperamide 2 mg Oral 4x Daily PRN Devi Garza MD    LORazepam 0 5 mg Oral Q8H PRN Devi Garza MD    memantine 5 mg Oral BID Devi Garza MD    ondansetron 4 mg Intravenous Q6H PRN Carrie [place], DO    oxyCODONE 2 5 mg Oral Q6H PRN Carrie [place], DO    oxyCODONE 5 mg Oral Q4H PRN Carrie [place], DO    pantoprazole 40 mg Oral BID AC Devi Garza MD    QUEtiapine 25 mg Oral HS Devi Garza MD    saccharomyces boulardii 250 mg Oral BID Devi Garza MD    sodium chloride 100 mL/hr Intravenous Continuous Alejandra Becker MD Last Rate: 100 mL/hr (06/16/18 2226)   sucralfate 1,000 mg Oral Q6H [address] [place] [address] MD Danette    vancomycin 125 mg Oral 4x Daily Kenneth Thrasher MD         Today, Patient Was Seen By: Emily Ferrer DO    ** Please Note: This note has been constructed using a voice recognition system   **

## 2018-06-17 NOTE — PROGRESS NOTES
Progress Note - Vascular Surgery   Bradley Wyatt 80 y o  female MRN: [id number]  Unit/Bed#: [facility] 525-01 Encounter: [id number]      Subjective:  No acute events overnight  Stable R foot pain    Vitals:  /79   Pulse (!) 124   Temp (!) 97 3 °F (36 3 °C)   Resp 18   Ht 5' 4" (1 626 m)   Wt 53 kg (116 lb 13 5 oz)   SpO2 99%   BMI 20 06 kg/m²     I/Os:  I/O last 3 completed shifts:  In: -   Out: 575 [Urine:575]  I/O this shift:  In: 1500 [I V :1000; IV Piggyback:500]  Out: 500 [Urine:300; Blood:200]    Lab Results and Cultures:   CBC with diff:   Lab Results   Component Value Date    WBC 15 56 (H) 06/17/2018    HGB 11 4 (L) 06/17/2018    HCT 35 4 06/17/2018    MCV 89 06/17/2018     06/17/2018    MCH 28 5 06/17/2018    MCHC 32 2 06/17/2018    RDW 14 9 06/17/2018    MPV 10 5 06/17/2018    NRBC 0 06/15/2018   ,   BMP/CMP:  Lab Results   Component Value Date     06/16/2018    K 3 3 (L) 06/16/2018     06/16/2018    CO2 25 06/16/2018    ANIONGAP 9 06/16/2018    BUN 15 06/16/2018    CREATININE 1 08 06/16/2018    GLUCOSE 111 06/16/2018    CALCIUM 7 4 (L) 06/16/2018    AST 16 06/11/2018    ALT 12 06/11/2018    ALKPHOS 68 06/11/2018    PROT 6 2 (L) 06/11/2018    BILITOT 0 60 06/11/2018    EGFR 45 06/16/2018   ,   Lipid Panel:   Lab Results   Component Value Date    CHOL 113 06/16/2018   ,   Coags:   Lab Results   Component Value Date    PTT 92 (H) 06/17/2018    INR 1 24 (H) 06/16/2018   ,     Blood Culture: No results found for: BLOODCX,   Urinalysis:   Lab Results   Component Value Date    COLORU Yellow 06/14/2018    CLARITYU Clear 06/14/2018    SPECGRAV 1 015 06/14/2018    PHUR 6 0 06/14/2018    LEUKOCYTESUR Negative 06/14/2018    NITRITE Negative 06/14/2018    PROTEINUA Trace (A) 06/14/2018    GLUCOSEU 100 (1/10%) (A) 06/14/2018    KETONESU Negative 06/14/2018    BILIRUBINUR Negative 06/14/2018    BLOODU Small (A) 06/14/2018   ,   Urine Culture: No results found for: URINECX,   Wound Culure:  No results found for: WOUNDCULT    Medications:  Current Facility-Administered Medications   Medication Dose Route Frequency    amLODIPine (NORVASC) tablet 10 mg  10 mg Oral Daily    atenolol (TENORMIN) tablet 100 mg  100 mg Oral Daily    cyanocobalamin (VITAMIN B-12) tablet 500 mcg  500 mcg Oral Daily    donepezil (ARICEPT) tablet 5 mg  5 mg Oral HS    heparin (porcine) 25,000 units in 250 mL infusion (premix)  3-30 Units/kg/hr (Order-Specific) Intravenous Titrated    heparin (porcine) injection 2,000 Units  2,000 Units Intravenous PRN    heparin (porcine) injection 4,000 Units  4,000 Units Intravenous PRN    lactated ringers infusion  20 mL/hr Intravenous Continuous    loperamide (IMODIUM) capsule 2 mg  2 mg Oral 4x Daily PRN    LORazepam (ATIVAN) tablet 0 5 mg  0 5 mg Oral Q8H PRN    memantine (NAMENDA) tablet 5 mg  5 mg Oral BID    ondansetron (ZOFRAN) injection 4 mg  4 mg Intravenous Q6H PRN    oxyCODONE (ROXICODONE) IR tablet 2 5 mg  2 5 mg Oral Q6H PRN    oxyCODONE (ROXICODONE) IR tablet 5 mg  5 mg Oral Q4H PRN    pantoprazole (PROTONIX) EC tablet 40 mg  40 mg Oral BID AC    QUEtiapine (SEROquel) tablet 25 mg  25 mg Oral HS    saccharomyces boulardii (FLORASTOR) capsule 250 mg  250 mg Oral BID    sodium chloride 0 9 % infusion  100 mL/hr Intravenous Continuous    sucralfate (CARAFATE) oral suspension 1,000 mg  1,000 mg Oral Q6H MARIZA    vancomycin (VANCOCIN) oral solution 125 mg  125 mg Oral 4x Daily       Imaging:      Physical Exam:    General: NAD  CV: RRR  Respiratory: nonlabored respirations on RA  Abdominal: soft, nt, nd  Extremities: R foot/leg remains cool  No motor/sensation  Compartments soft    Wound/Incision:  R groin wound hemostatic    Pulse exam:  Absent R foot signals  Dopp R fem    Assessment:  81yo female with PAD now with RLE rest pain/s/p R ileofemoral thromboembolectomy  R foot is nonsalvageable due to extent and duration of ischemia       Plan:  Continue heparin gtt  Serial leg exams  Await demarcation of RLE for possible amputation in near future if patient/family agrees  Analgesia  Rest of care per primary team    Kamilla Kiser MD  6/17/2018

## 2018-06-17 NOTE — PERIOPERATIVE NURSING NOTE
Dr Ann Palafox here to see pt she said to get ptt and hold if > 150  and if not follow protocol  he said that there was only femerol pulses and pt did not move her right leg

## 2018-06-17 NOTE — SOCIAL WORK
Patient is a readmit discharged on 5/30/18 from [facility] to [facility] where she has been a resident since July 2017  CM spoke with Ankit Walsh at facility [phone]  Pt is moderate assist with dressing, toilet and bathing due to dementia  Pt ambulates independently  No DME  Pt was receiving PT/OT at facility from [address] but was d/c from service on 6/8/18  PT/OT recommendation for STR  Per Ankit Walsh facility does not have rehab and patient would need to be re-evaluated to return  Contact at [facility] are [employer] or [employer]  [phone] discuss [facility] plan  CM will follow

## 2018-06-17 NOTE — ANESTHESIA POSTPROCEDURE EVALUATION
Post-Op Assessment Note      CV Status:  Stable    Mental Status:  Alert and awake    Hydration Status:  Euvolemic    PONV Controlled:  Controlled    Airway Patency:  Patent    Post Op Vitals Reviewed: Yes          Staff: CRNA           BP   155/66   Temp   98 6   Pulse  102   Resp   16   SpO2   100%

## 2018-06-17 NOTE — PROGRESS NOTES
Post op check    Procedure: RIGHT ILEO FEMORAL THROMBOEMBOLECTOMY   WITH COMPETION ARTERIOGRAM (Right)    S: Pain at incision - controlled with pain medicine    O:  Vitals:    06/16/18 2215 06/16/18 2230 06/16/18 2245 06/16/18 2322   BP: 141/70 129/70 131/64 110/54   BP Location:       Pulse: 102 (!) 108 104 96   Resp: 22 19 19 18   Temp:  98 °F (36 7 °C) 98 °F (36 7 °C) (!) 97 3 °F (36 3 °C)   TempSrc:       SpO2: 99% 97% 96% 99%   Height:         NAD  Irreg  nonlabored respirations  R groin dressing c/d/i  Dopp R fem  Absent R DP/PT    Regular diet  Analgesia  Hep gtt currently on hold for elevated PTT   Recheck PTT in 2 hours and resume if appropriate  Neurovascular checks    Kaya Rizzo MD

## 2018-06-17 NOTE — PROGRESS NOTES
As per Fontem, blue surgery, stop Heparin drip for PTT greater than 150  Recheck in 2 hours, if below 150 on recheck, restart heparin drip

## 2018-06-17 NOTE — INTERIM OP NOTE
RIGHT ILEO FEMORAL THROMBOEMBOLECTOMY   WITH COMPETION ARTERIOGRAM  Postoperative Note  PATIENT NAME: Tien Villanueva  : 3/28/1926  MRN: [id number]  [facility] OR ROOM 02    Surgery Date: 2018    Preop Diagnosis:  Arterial occlusion [I70 90]  Right lower extremity acute/subacute limb ischemia  Post-Op Diagnosis Codes:     * Arterial occlusion [I70 90]  Right lower extremity acute/subacute limb ischemia      Procedure(s) (LRB):  RIGHT ILEO FEMORAL THROMBOEMBOLECTOMY   WITH COMPETION ARTERIOGRAM (Right)    Surgeon(s) and Role:     * Segun Mendez DO - Primary     * Carleen Chan MD - Assisting    Specimens:  ID Type Source Tests Collected by Time Destination   1 : CLOT RIGHT FEMORAL ARTERY Tissue Hematoma TISSUE EXAM Segun Mendez DO 2018      IVF: 800ml crystalloids; 500ml albumin  UOP: 200ml  Estimated Blood Loss: 200 mL    Anesthesia Type:   General     Findings:   Significant amount of maroon colored thrombus retrieved from right iliac/Common femoral/profunda femoris and SFA/popliteal arteries  Completion arteriogram demonstrated patent R CFA/PFA and SFA  Popliteal artery and tibial vessels occluded  Of note since right lower leg/foot had marbled appearance with no motor function at ankle/foot any additional infrapopliteal intervention was not indicated and ill advised  As discussed with patient brittany Martinez right foot was nonsalvageable due to extent/duration of ischemia  He understands that this procedure was recommended in attempt to optimize healing potential of R AKA in near future once demarcation takes place  Complications:     None    SIGNATURE: Segun Mendez DO   DATE: 2018   TIME: 9:38 PM

## 2018-06-18 PROBLEM — R65.10 SIRS (SYSTEMIC INFLAMMATORY RESPONSE SYNDROME) (HCC): Status: ACTIVE | Noted: 2018-06-18

## 2018-06-18 LAB
ABO GROUP BLD BPU: NORMAL
ABO GROUP BLD BPU: NORMAL
ALBUMIN SERPL BCP-MCNC: 2.5 G/DL (ref 3.5–5)
ANION GAP SERPL CALCULATED.3IONS-SCNC: 11 MMOL/L (ref 4–13)
ANISOCYTOSIS BLD QL SMEAR: PRESENT
APTT PPP: 140 SECONDS (ref 24–36)
APTT PPP: 60 SECONDS (ref 24–36)
APTT PPP: 60 SECONDS (ref 24–36)
BASOPHILS # BLD MANUAL: 0 THOUSAND/UL (ref 0–0.1)
BASOPHILS NFR MAR MANUAL: 0 % (ref 0–1)
BPU ID: NORMAL
BPU ID: NORMAL
BUN SERPL-MCNC: 20 MG/DL (ref 5–25)
CALCIUM SERPL-MCNC: 7.2 MG/DL (ref 8.3–10.1)
CHLORIDE SERPL-SCNC: 110 MMOL/L (ref 100–108)
CO2 SERPL-SCNC: 18 MMOL/L (ref 21–32)
CREAT SERPL-MCNC: 1.39 MG/DL (ref 0.6–1.3)
CROSSMATCH: NORMAL
CROSSMATCH: NORMAL
EOSINOPHIL # BLD MANUAL: 0 THOUSAND/UL (ref 0–0.4)
EOSINOPHIL NFR BLD MANUAL: 0 % (ref 0–6)
ERYTHROCYTE [DISTWIDTH] IN BLOOD BY AUTOMATED COUNT: 15.2 % (ref 11.6–15.1)
GFR SERPL CREATININE-BSD FRML MDRD: 33 ML/MIN/1.73SQ M
GLUCOSE SERPL-MCNC: 76 MG/DL (ref 65–140)
HCT VFR BLD AUTO: 33 % (ref 34.8–46.1)
HGB BLD-MCNC: 10.2 G/DL (ref 11.5–15.4)
LYMPHOCYTES # BLD AUTO: 1.11 THOUSAND/UL (ref 0.6–4.47)
LYMPHOCYTES # BLD AUTO: 10 % (ref 14–44)
MCH RBC QN AUTO: 28.4 PG (ref 26.8–34.3)
MCHC RBC AUTO-ENTMCNC: 30.9 G/DL (ref 31.4–37.4)
MCV RBC AUTO: 92 FL (ref 82–98)
MICROCYTES BLD QL AUTO: PRESENT
MONOCYTES # BLD AUTO: 0.44 THOUSAND/UL (ref 0–1.22)
MONOCYTES NFR BLD: 4 % (ref 4–12)
MYELOCYTES NFR BLD MANUAL: 1 % (ref 0–1)
NEUTROPHILS # BLD MANUAL: 9.39 THOUSAND/UL (ref 1.85–7.62)
NEUTS SEG NFR BLD AUTO: 85 % (ref 43–75)
NRBC BLD AUTO-RTO: 0 /100 WBCS
PHOSPHATE SERPL-MCNC: 2.1 MG/DL (ref 2.3–4.1)
PLATELET # BLD AUTO: 251 THOUSANDS/UL (ref 149–390)
PLATELET BLD QL SMEAR: ADEQUATE
PMV BLD AUTO: 10.9 FL (ref 8.9–12.7)
POTASSIUM SERPL-SCNC: 4 MMOL/L (ref 3.5–5.3)
RBC # BLD AUTO: 3.59 MILLION/UL (ref 3.81–5.12)
RBC MORPH BLD: PRESENT
SODIUM SERPL-SCNC: 139 MMOL/L (ref 136–145)
UNIT DISPENSE STATUS: NORMAL
UNIT DISPENSE STATUS: NORMAL
UNIT PRODUCT CODE: NORMAL
UNIT PRODUCT CODE: NORMAL
UNIT RH: NORMAL
UNIT RH: NORMAL
WBC # BLD AUTO: 11.05 THOUSAND/UL (ref 4.31–10.16)

## 2018-06-18 PROCEDURE — 85730 THROMBOPLASTIN TIME PARTIAL: CPT | Performed by: INTERNAL MEDICINE

## 2018-06-18 PROCEDURE — 99024 POSTOP FOLLOW-UP VISIT: CPT | Performed by: SURGERY

## 2018-06-18 PROCEDURE — 80069 RENAL FUNCTION PANEL: CPT | Performed by: INTERNAL MEDICINE

## 2018-06-18 PROCEDURE — 85027 COMPLETE CBC AUTOMATED: CPT | Performed by: INTERNAL MEDICINE

## 2018-06-18 PROCEDURE — 85730 THROMBOPLASTIN TIME PARTIAL: CPT | Performed by: SURGERY

## 2018-06-18 PROCEDURE — 85007 BL SMEAR W/DIFF WBC COUNT: CPT | Performed by: INTERNAL MEDICINE

## 2018-06-18 PROCEDURE — 99232 SBSQ HOSP IP/OBS MODERATE 35: CPT | Performed by: INTERNAL MEDICINE

## 2018-06-18 RX ADMIN — VANCOMYCIN HYDROCHLORIDE 125 MG: 10 INJECTION, POWDER, LYOPHILIZED, FOR SOLUTION INTRAVENOUS at 17:27

## 2018-06-18 RX ADMIN — SUCRALFATE 1000 MG: 1 SUSPENSION ORAL at 11:37

## 2018-06-18 RX ADMIN — DONEPEZIL HYDROCHLORIDE 5 MG: 5 TABLET, FILM COATED ORAL at 21:05

## 2018-06-18 RX ADMIN — LORAZEPAM 0.5 MG: 0.5 TABLET ORAL at 05:27

## 2018-06-18 RX ADMIN — MEMANTINE HYDROCHLORIDE 5 MG: 5 TABLET ORAL at 17:24

## 2018-06-18 RX ADMIN — OXYCODONE HYDROCHLORIDE 5 MG: 5 TABLET ORAL at 05:26

## 2018-06-18 RX ADMIN — MEMANTINE HYDROCHLORIDE 5 MG: 5 TABLET ORAL at 08:48

## 2018-06-18 RX ADMIN — CYANOCOBALAMIN TAB 500 MCG 500 MCG: 500 TAB at 08:47

## 2018-06-18 RX ADMIN — SODIUM CHLORIDE 100 ML/HR: 0.9 INJECTION, SOLUTION INTRAVENOUS at 10:34

## 2018-06-18 RX ADMIN — PANTOPRAZOLE SODIUM 40 MG: 40 TABLET, DELAYED RELEASE ORAL at 17:24

## 2018-06-18 RX ADMIN — VANCOMYCIN HYDROCHLORIDE 125 MG: 10 INJECTION, POWDER, LYOPHILIZED, FOR SOLUTION INTRAVENOUS at 21:05

## 2018-06-18 RX ADMIN — OXYCODONE HYDROCHLORIDE 5 MG: 5 TABLET ORAL at 17:36

## 2018-06-18 RX ADMIN — HEPARIN SODIUM 21 UNITS/KG/HR: 10000 INJECTION, SOLUTION INTRAVENOUS at 17:22

## 2018-06-18 RX ADMIN — VANCOMYCIN HYDROCHLORIDE 125 MG: 10 INJECTION, POWDER, LYOPHILIZED, FOR SOLUTION INTRAVENOUS at 08:47

## 2018-06-18 RX ADMIN — Medication 250 MG: at 17:24

## 2018-06-18 RX ADMIN — SUCRALFATE 1000 MG: 1 SUSPENSION ORAL at 05:27

## 2018-06-18 RX ADMIN — SUCRALFATE 1000 MG: 1 SUSPENSION ORAL at 17:24

## 2018-06-18 RX ADMIN — SODIUM CHLORIDE 100 ML/HR: 0.9 INJECTION, SOLUTION INTRAVENOUS at 20:24

## 2018-06-18 RX ADMIN — VANCOMYCIN HYDROCHLORIDE 125 MG: 10 INJECTION, POWDER, LYOPHILIZED, FOR SOLUTION INTRAVENOUS at 11:37

## 2018-06-18 RX ADMIN — OXYCODONE HYDROCHLORIDE 5 MG: 5 TABLET ORAL at 13:02

## 2018-06-18 RX ADMIN — PANTOPRAZOLE SODIUM 40 MG: 40 TABLET, DELAYED RELEASE ORAL at 05:26

## 2018-06-18 RX ADMIN — QUETIAPINE FUMARATE 25 MG: 25 TABLET, FILM COATED ORAL at 21:05

## 2018-06-18 RX ADMIN — Medication 250 MG: at 08:47

## 2018-06-18 NOTE — OP NOTE
OPERATIVE REPORT  PATIENT NAME: Luis F Ellis    :  3/28/1926  MRN: [id number]  Pt Location: [facility] RO[facility] 02    SURGERY DATE: 2018    Surgeon(s) and Role:     * Segun Mendez DO - Primary     * Divina Piper MD - Assisting    Preop Diagnosis:  Arterial occlusion [I70 90]    Post-Op Diagnosis Codes:     * Arterial occlusion [I70 90]    Procedure(s) (LRB):  RIGHT ILEO FEMORAL THROMBOEMBOLECTOMY   WITH COMPETION ARTERIOGRAM (Right)    Specimen(s):  ID Type Source Tests Collected by Time Destination   1 : CLOT RIGHT FEMORAL ARTERY Tissue Hematoma TISSUE EXAM Segun Mendez DO 2018      IVF: 800ml crystalloids; 500ml albumin  UOP: 200ml  Estimated Blood Loss:   200 mL    Drains:  Urethral Catheter (Active)   Reasons to continue Urinary Catheter  Acute urinary retention/obstruction failing urinary retention protocol 2018  5:55 PM   Goal for Removal Other (Comment) 2018  5:55 PM   Site Assessment Clean;Skin intact 2018  9:01 AM   Collection Container Standard drainage bag 2018  9:01 AM   Securement Method Securing device (Describe) 2018  9:01 AM   Output (mL) 650 mL 2018  5:36 PM   Number of days: 0       [REMOVED] Urethral Catheter 16 Fr  (Removed)   Amt returned on insertion(mL) 75 mL 2018  9:01 PM   Site Assessment Clean;Skin intact 2018  8:01 AM   Collection Container Standard drainage bag 2018  8:01 AM   Securement Method Securing device (Describe) 2018  8:01 AM   Output (mL) 200 mL 2018  6:58 AM   Number of days: 3       Anesthesia Type:   General    Operative Indications:  Arterial occlusion []  24-year-old woman with dementia who had been transferred from [address] [place] [address] secondary to right lower extremity acute limb ischemia of unknown duration  Upon arrival to [facility] patient's right lower leg was significantly mottled and cold touch with minimal movement at the ankle    A CTA was performed which demonstrated right common femoral occlusion, bilateral SFA/popliteal occlusions was significant tibioperoneal disease  Patient had been initiated on anticoagulation prior to transfer to [facility] she was brought to the operating room for the above-mentioned procedure  Operative Findings:  Significant amount of maroon colored thrombus retrieved from right iliac/Common femoral/profunda femoris and SFA/popliteal arteries  Completion arteriogram demonstrated patent R CFA/PFA and SFA  Popliteal artery and tibial vessels occluded  Of note since right lower leg/foot had marbled appearance with no motor function at ankle/foot any additional infrapopliteal intervention was not indicated and ill advised  As discussed with patient's grandson Pablo Mcmahon preoperative right foot was nonsalvageable due to extent/duration of ischemia  He understood that this procedure was being recommended an attempt to optimize healing potential of R AKA in near future once demarcation takes place    Complications:   None    Procedure and Technique:  The patient was properly identified in the preop holding area  Patient's name, laterality, and nature procedure confirmed  The operative site was marked  Patient was brought to the operating room where she was positioned supine on the OR table  After adequate induction general anesthesia patient was intubated without difficulty  A Pugh catheter was inserted under sterile conditions  A preoperative dose of antibiotics was administered  A formal time-out was performed and all were in agreement  The right lower extremity was circumferentially prepped and draped in usual sterile fashion  A longitudinal right groin incision was carried out with a 15  Scalpel  This was deepened down through the subcutaneous tissue using the electrocautery  Self-retaining retractors were positioned to facilitate exposure    The femoral sheath was identified and incised longitudinally  Sharp dissection was carried down onto the proximal right common femoral artery  This was encircled with a vessel loop  Dissection was carried down distally and both the proximal superficial femoral artery and profunda femorals were dissected free from surrounding tissue and encircled with vessel loops  Additional bolus of heparin was given in addition to the on going heparin drip  An atraumatic vascular clamp was used for proximal control and the vessel loops pulled taut on the profunda femoris/superficial femoral arteries  A transverse arteriotomy was carried out with a 11  Scalpel  A 4  Lina thromboembolectomy was initially passed proximally with retrieval of significant maroon color clot with restoration of pulsatile inflow  The vessel was flushed with heparinized saline and reoccluded  A 3  Lina thromboembolectomy catheter was next passed down the superficial femoral artery and began with retrieval of significant amount of maroon colored clot  Multiple passes were made until no additional clot was retrieved  Of note we did me significant resistance at approximately 30/40 cm tone  Suboptimal blood bleeding was now noted from the superficial femoral artery  There was adequate backbleeding from the profunda femoris noted  The vessels were copiously irrigated with heparinized saline  The arteriotomy was reapproximated using a running 5-0 Prolene suture  The vessel loop and atraumatic vascular clamps were released restoring flow down the leg  The common femoral artery was now accessed using a micropuncture needle  The needle was exchanged out for a 0 018 inch wire for a micro sheath  Right lower extremity arteriogram performed through the micro sheath  Please refer to the above findings  There was no significant infrapopliteal runoff  There was no named vessel that opacified  There was diminutive/atretic collateral branches noted    Thus considering patient's age and unknown duration of ischemia no additional interventions were attempted  The access site was controlled with a Prolene stitch  The wound was copiously irrigated with saline  Hemostasis was secured  The wound was closed in a multilayered fashion  The skin was reapproximated using skin staples  The patient remained hemodynamically stable  She was awakened, extubated and transferred to recovery room    The patient's grandson the adjacent was notified via telephone of the intraoperative findings and eventual need for major amputation once level of demarcation I was present for the entire procedure and A qualified resident physician was not available    Patient Disposition:  PACU     SIGNATURE: Joyce Zamarripa DO  DATE: June 18, 2018  TIME: 7:04 PM

## 2018-06-18 NOTE — ASSESSMENT & PLAN NOTE
Acute/subacute RLE in setting of afib not on anticoagulation due to recent GI bleed and PAD    Asymptomatic Left SFA occlusion    ~Right Ileofemoral/SFA thrombectomy, completion Agram 6/16    Plan:  --Non salvageable RLE despite revascularization   Pt will require RLE amputation vs palliative care  --No intervention for L SFA occlusion as it is asymptomatic  --Continue Heparin gtt for now   --Pain control  --Consider Palliative care consult

## 2018-06-18 NOTE — PROGRESS NOTES
[facility] Progress Note  Patient: Shirley Silva 80 y o  female   MRN: [id number]  PCP: No primary care provider on file  [facility] Encounter: [id number]  Date Of Visit: 06/18/18    Assessment:    Principal Problem:    Arterial occlusion  Active Problems:    Atrial fibrillation (HCC)    Alzheimer's dementia with behavioral disturbance    Physical deconditioning    C  difficile diarrhea    History of ischemic left MCA stroke      Plan:    · Right Lower Extremity Ischemia / PAD -   · Heparin drip  Monitor for any bleeding given GI bleed on 5/27/2018  · Follow up CTA study done 6/16/2018 (still not read as of this morning)  · Vascular surgery awaiting demarcation of RLE for possible amputation in near future if family / patient agrees  Will need continue to discuss with vascular surgery what kind of timeline we are looking at given grandson's statement that he can not make a decision for 1 week till he can talk to patient in person  Surgery has stated though that urgency increases if patient begins to get any ulceration or necrosis / gangrene developing  · Vascular surgery looking to communicate with family regarding goals of care  · Continue regular levels of care, but will consult palliative care team and continue to have discussions with family (see goals of care below)  · Pain Control -   · Scheduled tylenol  · Low dose oxycodone 2 5 - 5 mg for pain symptoms  · Monitor Pain levels  · SIRS POA - treat PAD and treat the C-diff colitis (prehospital)  Monitor vitals and labs  Improved  · Essential Hypertension -   · Valsartan stopped 6/16/2018  Will stop the amlodipine as well  If blood pressure goes higher, could consider a 2 5 mg dose restart of amlodipine  Noted is that during this admission atenolol dose was increased for elevated blood pressure with tachycardia  Monitor blood pressures    · Alzheimer Dementia with Behavioral Disturbances -   · Continue Aricept and Namenda  · For behaviors, continue seroquel qhs and PRN ativan  · Patient is on a 1-1 observation which will be continued  · C-diff Colitis (POA) - continue on oral vancomycin  Due to complete treatment on 6/25/2018  Monitor BMs and hydration status  · History of Stroke -   · Antiplatelet / Anticoagulation - heparin drip  · Statin - None currently  · Atrial Fibrillation -   · Rate control - Increasing atenolol  Continue telemetry monitoring given tachycardia  · Anticoagulation - heparin drip currently  Has had stroke before and also ambulatory dysfunction places a higher risk for bleed  Deemed by outpatient cardiologist to be poor candidate for anticoagulation due to advanced age and bleeding risk (GI bleed, falls, etc)  · GERD - PPI therapy  Monitor for symptoms  · Ambulatory dysfunction - PT / OT evaluations recommend acute inpatient rehab post discharge  · Goals of Care -   · 6/17/2018 - discussed goals of care with grandSabrina junior, on phone today in light of the limb ischemia in patient with known advanced dementia and advanced age  Options of surgery followed by rehab and wound care course vs  No surgery / Palliative care given  He is unsure of which way to proceed and asks to be able to wait a week till he is back in town and able to discuss with her before coming up with a decision  While I expressed to him that we are not so urgently looking for that decision today, that the longer we wait for a decision (if surgery is decided upon) then the more damage that is likely to occur  We will talk to him again tomorrow  · 6/18/2018 - today spoke to Sabrina Bailey more about goals of care and that patient would be higher risk not just for surgery but for post operative complications and falls after surgery due to severe dementia    Discussed that a palliative care consult is two fold in that they can work to help treat pain symptoms but also review options for conservative care measures as well including comfort based care approach  He has not agreed to hospice, but would be interested to hear from palliative care team        VTE Pharmacologic Prophylaxis:   Pharmacologic: Heparin Drip  Mechanical VTE Prophylaxis in Place: Yes    Patient Centered Rounds: I have performed bedside rounds with nursing staff today  Discussions with Specialists or Other Care Team Provider: Kris Araiza with surgery  Education and Discussions with Family / Patient:  Updated Anastasiia Lewis via phone  Main contact is Anastasiia soto Joshua, at   Time Spent for Care: 30 minutes  More than 50% of total time spent on counseling and coordination of care as described above  Current Length of Stay: 2 day(s)    Current Patient Status: Inpatient   Certification Statement: The patient will continue to require additional inpatient hospital stay due to evaluation for above medical conditions  Discharge Plan / Estimated Discharge Date: to be determined  Code Status: Level 3 - DNAR and DNI      Subjective:   Nursing aide at bed states patient has been complaining that she has pain in the RLE  She states that she has "a little bit of pain when I walk"  The patient denies any current pain and does not appear to be in any significant pain currently  The patient denies any dyspnea, dizziness, nausea, and has had no obvious bleeding  Objective:     Vitals:   Temp (24hrs), Av 6 °F (37 °C), Min:98 3 °F (36 8 °C), Max:98 8 °F (37 1 °C)    HR:  [] 75  Resp:  [15-20] 15  BP: ()/(44-88) 90/60  SpO2:  [91 %-96 %] 91 %  Body mass index is 21 04 kg/m²  Input and Output Summary (last 24 hours): Intake/Output Summary (Last 24 hours) at 18 0839  Last data filed at 18 0700   Gross per 24 hour   Intake              640 ml   Output              900 ml   Net             -260 ml       Physical Exam:     Physical Exam   Constitutional: No distress  More relaxed / calmer at present time     HENT: Mouth/Throat: Oropharynx is clear and moist    Eyes: Pupils are equal, round, and reactive to light  Cardiovascular: Normal rate  No murmur heard  Irregular rhythm   Pulmonary/Chest: Effort normal  She has no wheezes  She has no rales  Abdominal: Soft  Bowel sounds are normal  She exhibits no distension  There is no tenderness  Musculoskeletal: She exhibits tenderness (mild in RLE at foot and just abvoe ankle  )  She exhibits no edema  RLE with erythema and toes appear to have some cyanosis  Small skin tear in the pretibial region is covered with gauze  No ulcers or necrotic regions on the foot yet  Mid lower leg and distally is cold to touch  LLE also colder to palpation but has more normal color  The right leg is mildly edematous  Neurological: She is alert  No cranial nerve deficit  She exhibits normal muscle tone  Motor 5/5 to all extremities  Sensation grossly intact  The patient is impulsive  The patient follows very simple commands  Skin: Skin is warm and dry  Vitals reviewed  Additional Data:     Labs:      Results from last 7 days  Lab Units 06/18/18  0507 06/17/18  0437   WBC Thousand/uL 11 05* 15 56*   HEMOGLOBIN g/dL 10 2* 11 4*   HEMATOCRIT % 33 0* 35 4   PLATELETS Thousands/uL 251 328   NEUTROS PCT %  --  91*   LYMPHS PCT %  --  4*   LYMPHO PCT % 10*  --    MONOS PCT %  --  4   MONO PCT MAN % 4  --    EOS PCT %  --  0   EOSINO PCT MANUAL % 0  --        Results from last 7 days  Lab Units 06/18/18  0507   SODIUM mmol/L 139   POTASSIUM mmol/L 4 0   CHLORIDE mmol/L 110*   CO2 mmol/L 18*   BUN mg/dL 20   CREATININE mg/dL 1 39*   CALCIUM mg/dL 7 2*   GLUCOSE RANDOM mg/dL 76       Results from last 7 days  Lab Units 06/16/18  0040   INR  1 24*       * I Have Reviewed All Lab Data Listed Above  * Additional Pertinent Lab Tests Reviewed:  All Labs Within Last 24 Hours Reviewed    Imaging:    Imaging Reports Reviewed Today Include: No new imaging reports yet available  Imaging Personally Reviewed by Myself Includes: None    Recent Cultures (last 7 days):           Last 24 Hours Medication List:     Current Facility-Administered Medications:  amLODIPine 5 mg Oral Daily Carmel Garner DO    atenolol 100 mg Oral Daily Carmel Garner DO    cyanocobalamin 500 mcg Oral Daily Becky Robin MD    donepezil 5 mg Oral HS Becky Robin MD    heparin (porcine) 3-30 Units/kg/hr (Order-Specific) Intravenous Titrated Becky Robin MD Last Rate: 21 Units/kg/hr (06/18/18 0220)   heparin (porcine) 2,000 Units Intravenous PRN Becky Robin MD    heparin (porcine) 4,000 Units Intravenous PRN Becky Robin MD    lactated ringers 20 mL/hr Intravenous Continuous Adolfo Velazquez MD Last Rate: Stopped (06/16/18 5791)   loperamide 2 mg Oral 4x Daily PRN Becky Robin MD    LORazepam 0 5 mg Oral Q8H PRN Becky Robin MD    memantine 5 mg Oral BID Becky Robin MD    ondansetron 4 mg Intravenous Q6H PRN Carmel Garner, DO    oxyCODONE 2 5 mg Oral Q6H PRN Carmel Garner, DO    oxyCODONE 5 mg Oral Q4H PRN Carmel Garner, DO    pantoprazole 40 mg Oral BID AC Becky Robin MD    QUEtiapine 25 mg Oral HS Becky Robin MD    saccharomyces boulardii 250 mg Oral BID Becky Robin MD    sodium chloride 100 mL/hr Intravenous Continuous Tito Reddy MD Last Rate: 100 mL/hr (06/17/18 1539)   sucralfate 1,000 mg Oral Q6H [address] Becky Robin MD    vancomycin 125 mg Oral 4x Daily Becky Robin MD         Today, Patient Was Seen By: Carmel Garner DO    ** Please Note: This note has been constructed using a voice recognition system   **

## 2018-06-18 NOTE — PROGRESS NOTES
Vascular Surgery    Progress Note - Montez Waldron 3/28/1926, 80 y o  female MRN: [id number]    Unit/Bed#: [facility] 525-01 Encounter: [id number]    Primary Care Provider: No primary care provider on file  Date and time admitted to hospital: 6/16/2018  4:36 AM    * Arterial occlusion   Assessment & Plan    Acute/subacute RLE in setting of afib not on anticoagulation due to recent GI bleed and PAD    Asymptomatic Left SFA occlusion    ~Right Ileofemoral/SFA thrombectomy, completion Agram 6/16    Plan:  --Non salvageable RLE despite revascularization  Pt will require RLE amputation vs palliative care  --No intervention for L SFA occlusion as it is asymptomatic  --Continue Heparin gtt for now   --Pain control  --Consider Palliative care consult          Subjective:  Pt c/o pain in right foot, per nursing staff she was awake all night    Vitals:  /70   Pulse 75   Temp 98 6 °F (37 °C) (Axillary)   Resp 15   Ht 5' 4" (1 626 m)   Wt 55 6 kg (122 lb 9 6 oz)   SpO2 91%   BMI 21 04 kg/m²     I/Os:  I/O last 3 completed shifts: In: 6808 [P O :1120; I V :1110; IV Piggyback:500]  Out: 1600 [Urine:1400; Blood:200]  No intake/output data recorded      Lab Results and Cultures:   Lab Results   Component Value Date    WBC 11 05 (H) 06/18/2018    HGB 10 2 (L) 06/18/2018    HCT 33 0 (L) 06/18/2018    MCV 92 06/18/2018     06/18/2018     Lab Results   Component Value Date    GLUCOSE 76 06/18/2018    CALCIUM 7 2 (L) 06/18/2018     06/18/2018    K 4 0 06/18/2018    CO2 18 (L) 06/18/2018     (H) 06/18/2018    BUN 20 06/18/2018    CREATININE 1 39 (H) 06/18/2018     Lab Results   Component Value Date    INR 1 24 (H) 06/16/2018    INR 1 22 (H) 05/27/2018    INR 2 64 (H) 07/03/2017    PROTIME 15 5 (H) 06/16/2018    PROTIME 15 3 (H) 05/27/2018    PROTIME 28 5 (H) 07/03/2017       Medications:  Current Facility-Administered Medications   Medication Dose Route Frequency    atenolol (TENORMIN) tablet 100 mg  100 mg Oral Daily    cyanocobalamin (VITAMIN B-12) tablet 500 mcg  500 mcg Oral Daily    donepezil (ARICEPT) tablet 5 mg  5 mg Oral HS    heparin (porcine) 25,000 units in 250 mL infusion (premix)  3-30 Units/kg/hr (Order-Specific) Intravenous Titrated    heparin (porcine) injection 2,000 Units  2,000 Units Intravenous PRN    heparin (porcine) injection 4,000 Units  4,000 Units Intravenous PRN    lactated ringers infusion  20 mL/hr Intravenous Continuous    loperamide (IMODIUM) capsule 2 mg  2 mg Oral 4x Daily PRN    LORazepam (ATIVAN) tablet 0 5 mg  0 5 mg Oral Q8H PRN    memantine (NAMENDA) tablet 5 mg  5 mg Oral BID    ondansetron (ZOFRAN) injection 4 mg  4 mg Intravenous Q6H PRN    oxyCODONE (ROXICODONE) IR tablet 2 5 mg  2 5 mg Oral Q6H PRN    oxyCODONE (ROXICODONE) IR tablet 5 mg  5 mg Oral Q4H PRN    pantoprazole (PROTONIX) EC tablet 40 mg  40 mg Oral BID AC    QUEtiapine (SEROquel) tablet 25 mg  25 mg Oral HS    saccharomyces boulardii (FLORASTOR) capsule 250 mg  250 mg Oral BID    sodium chloride 0 9 % infusion  100 mL/hr Intravenous Continuous    sucralfate (CARAFATE) oral suspension 1,000 mg  1,000 mg Oral Q6H [address]    vancomycin (VANCOCIN) oral solution 125 mg  125 mg Oral 4x Daily       Physical Exam:    General appearance: alert and oriented, in no acute distress  Lungs: clear to auscultation bilaterally  Heart: irregularly irregular rhythm  Abdomen: soft, non-tender; bowel sounds normal; no masses,  no organomegaly  Extremities: Right foot cool and cyanotic, decreased motor, Left foot warm, M/S intact      Wound/Incision:  Right groin incision clean, dry, and intact    Pulse exam:  DP: Right: non-palpable Left: non-palpable  PT: Right: non-palpable Left: non-palpable      Kurt Garcia, JOHN  6/18/2018

## 2018-06-18 NOTE — PLAN OF CARE
DISCHARGE PLANNING - CARE MANAGEMENT     Discharge to post-acute care or home with appropriate resources Progressing        Knowledge Deficit     Patient/family/caregiver demonstrates understanding of disease process, treatment plan, medications, and discharge instructions Progressing        MUSCULOSKELETAL - ADULT     Maintain or return mobility to safest level of function Progressing     Maintain proper alignment of affected body part Progressing        PAIN - ADULT     Verbalizes/displays adequate comfort level or baseline comfort level Progressing        Potential for Falls     Patient will remain free of falls Progressing        Prexisting or High Potential for Compromised Skin Integrity     Skin integrity is maintained or improved Progressing        SAFETY ADULT     Maintain or return to baseline ADL function Progressing     Maintain or return mobility status to optimal level Progressing        SKIN/TISSUE INTEGRITY - ADULT     Skin integrity remains intact Progressing

## 2018-06-18 NOTE — CASE MANAGEMENT
THIS IS A CONTINUATION OF CARE EPISODE  *[facility] INPATIENT ADMISSION 6/14/18 - 6/16/18  *INPATIENT TRANSFER TO [address] [place] [address] 6/16/18     *INITIAL REVIEW AT [facility] - IQ NOT MET  *PER 2ND LEVEL PA REVIEW DR Adolfo Truong - appropriate for INPATIENT ADMISSION           Continued Stay Review    Date: 6/16/16 Saturday ACUTE MED SURG LEVEL OF CARE    Vital Signs: /70   Pulse 75   Temp 98 6 °F (37 °C) (Axillary)   Resp 15   Ht 5' 4" (1 626 m)   Wt 55 6 kg (122 lb 9 6 oz)   SpO2 91%   BMI 21 04 kg/m²      Vitals:   Blood Pressure: 110/79 (06/16/18 0437)  Pulse: (!) 120 (06/16/18 0437)  SpO2: 96 % (06/16/18 0437)       Regular House Diet    Continuous IV Infusions:   heparin (porcine) 3-30 Units/kg/hr (Order-Specific) Last Rate: 21 Units/kg/hr (06/18/18 0220)   sodium chloride 100 mL/hr Last Rate: 100 mL/hr (06/18/18 1034)     Medications:   Scheduled Meds:   Current Facility-Administered Medications:  atenolol 100 mg Oral Daily Burt Lord, DO    cyanocobalamin 500 mcg Oral Daily Becky Robin MD    donepezil 5 mg Oral HS Becky Robin MD    heparin (porcine) 3-30 Units/kg/hr (Order-Specific) Intravenous Titrated Becky Robin MD Last Rate: 21 Units/kg/hr (06/18/18 0220)   heparin (porcine) 2,000 Units Intravenous PRN Becky Robin MD    heparin (porcine) 4,000 Units Intravenous PRN Becky Robin MD    loperamide 2 mg Oral 4x Daily PRN Becky Robin MD    LORazepam 0 5 mg Oral Q8H PRN Becky Robin MD    memantine 5 mg Oral BID Becky Robin MD    ondansetron 4 mg Intravenous Q6H PRN Burt Lord, DO    oxyCODONE 2 5 mg Oral Q6H PRN Burt Lord, DO    oxyCODONE 5 mg Oral Q4H PRN Burt Lord, DO    pantoprazole 40 mg Oral BID AC Becky Robin MD    QUEtiapine 25 mg Oral HS MD isidro Orellanadii 250 mg Oral BID Becky Robin MD    sodium chloride 100 mL/hr Intravenous Continuous Tito Reddy MD Last Rate: 100 mL/hr (06/18/18 1034)   sucralfate 1,000 mg Oral Q6H [address] Becky Robin MD    vancomycin 125 mg Oral 4x Daily Kenneth Thrasher MD        PRN Meds:     heparin (porcine)    loperamide    LORazepam 0 5 mg q8hrs prn given x 2/ 24 hrs    ondansetron    oxyCODONE 2 5 mg q4hrs prn given x 1/ 24 hrs    oxyCODONE 5 mg q4hrs prn given x 4/ 24 hrs      LABS/Diagnostic Results:   Results from last 7 days  Lab Units 06/16/18  0040 06/15/18  0511   WBC Thousand/uL 10 19* 6 48   HEMOGLOBIN g/dL 11 7 11 5   HEMATOCRIT % 35 7 34 6*   PLATELETS Thousands/uL 292 241   NEUTROS PCT %  --  69   LYMPHS PCT %  --  18   MONOS PCT %  --  10   EOS PCT %  --  1       Results from last 7 days  Lab Units 06/15/18  0511   06/11/18  0135   SODIUM mmol/L 141  < > 140   POTASSIUM mmol/L 3 4*  < > 3 9   CHLORIDE mmol/L 106  < > 105   CO2 mmol/L 29  < > 29   BUN mg/dL 11  < > 20   CREATININE mg/dL 0 99  < > 1 41*   CALCIUM mg/dL 8 6  < > 8 0*   TOTAL PROTEIN g/dL  --   --  6 2*   BILIRUBIN TOTAL mg/dL  --   --  0 60   ALK PHOS U/L  --   --  68   ALT U/L  --   --  12   AST U/L  --   --  16   GLUCOSE RANDOM mg/dL 102  < > 127      Results from last 7 days  Lab Units 06/16/18  0040   INR   1 24*         CT HEAD -  1  No acute intracranial hemorrhage, mass effect or extra-axial collection  2   Chronic left MCA territory infarct    Microangiopathic disease        Age/Sex: 80 y o  female       Assessment/Plan:  80 y o  female with a medical history significant for afib with recent discontinuation of anticoag in May 2/2 melena, PVD, dementia, recent c diff now on vancomycin, CKD, HTN, GERD who is transferred from [facility] with concerns for ischemic bilateral legs     # Bilateral leg vascular disease  - c/f arterial occlusion given new presentation of cold, pulseless legs R>L  - continue heparin gtt  - vascular consult  - will keep NPO for now pending vascular input     # Falls, weakness  - multifactorial 2/2 dementia, physical deconditioning, infection  - fall and safety precautions  - PT/OT  - continue 1:1 for dementia and fall risk, behavioral disturbance     # Chronic MCA territory infarcts  - noted on CT brain from 6/14 and 6/13  - has known afib  - check LDL and A1c     # C diff   - continue outpatient regimen with vancomycin to be completed on 6/25  - contact precautions     # Alzheimers dementia  - cont ativan, seroquel, aricept, namenda     # Afib - cont atenolol, on heparin drip  # HTN - cont norvasc, atenolol, valsartan  # GERD - cont ppi      FEN: Diet NPO; Sips with meds  VTE Prophylaxis: Heparin Drip    Code: Level 3 - DNAR and DNI per records     I have discussed the plan of care with: admission     Anticipated Length of Stay:  Patient will be admitted on an Inpatient basis with an anticipated length of stay of greater than 2 midnights       Justification for Hospital Stay: ischemic limbs          Discharge Plan:   TO BE DETERMINED    *PTA RESIDENT BERE PRICE [facility] DEMENTIA UNIT     CASE MANAGEMENT FOLLOWING CLOSELY FOR ALL DISCHARGE NEEDS

## 2018-06-19 LAB
ANION GAP SERPL CALCULATED.3IONS-SCNC: 10 MMOL/L (ref 4–13)
APTT PPP: 64 SECONDS (ref 24–36)
BUN SERPL-MCNC: 14 MG/DL (ref 5–25)
CALCIUM SERPL-MCNC: 7.7 MG/DL (ref 8.3–10.1)
CHLORIDE SERPL-SCNC: 108 MMOL/L (ref 100–108)
CO2 SERPL-SCNC: 22 MMOL/L (ref 21–32)
CREAT SERPL-MCNC: 1.05 MG/DL (ref 0.6–1.3)
ERYTHROCYTE [DISTWIDTH] IN BLOOD BY AUTOMATED COUNT: 14.8 % (ref 11.6–15.1)
GFR SERPL CREATININE-BSD FRML MDRD: 46 ML/MIN/1.73SQ M
GLUCOSE SERPL-MCNC: 91 MG/DL (ref 65–140)
HCT VFR BLD AUTO: 32.8 % (ref 34.8–46.1)
HGB BLD-MCNC: 10.3 G/DL (ref 11.5–15.4)
MCH RBC QN AUTO: 28.2 PG (ref 26.8–34.3)
MCHC RBC AUTO-ENTMCNC: 31.4 G/DL (ref 31.4–37.4)
MCV RBC AUTO: 90 FL (ref 82–98)
PLATELET # BLD AUTO: 245 THOUSANDS/UL (ref 149–390)
PMV BLD AUTO: 11 FL (ref 8.9–12.7)
POTASSIUM SERPL-SCNC: 3.3 MMOL/L (ref 3.5–5.3)
RBC # BLD AUTO: 3.65 MILLION/UL (ref 3.81–5.12)
SODIUM SERPL-SCNC: 140 MMOL/L (ref 136–145)
WBC # BLD AUTO: 9.99 THOUSAND/UL (ref 4.31–10.16)

## 2018-06-19 PROCEDURE — 85730 THROMBOPLASTIN TIME PARTIAL: CPT | Performed by: SURGERY

## 2018-06-19 PROCEDURE — 85027 COMPLETE CBC AUTOMATED: CPT | Performed by: PHYSICIAN ASSISTANT

## 2018-06-19 PROCEDURE — 99024 POSTOP FOLLOW-UP VISIT: CPT | Performed by: SURGERY

## 2018-06-19 PROCEDURE — 99233 SBSQ HOSP IP/OBS HIGH 50: CPT | Performed by: GENERAL PRACTICE

## 2018-06-19 PROCEDURE — 97535 SELF CARE MNGMENT TRAINING: CPT

## 2018-06-19 PROCEDURE — 99221 1ST HOSP IP/OBS SF/LOW 40: CPT | Performed by: NURSE PRACTITIONER

## 2018-06-19 PROCEDURE — 80048 BASIC METABOLIC PNL TOTAL CA: CPT | Performed by: PHYSICIAN ASSISTANT

## 2018-06-19 PROCEDURE — 97530 THERAPEUTIC ACTIVITIES: CPT

## 2018-06-19 RX ORDER — OXYCODONE HYDROCHLORIDE 5 MG/1
10 TABLET ORAL EVERY 2 HOUR PRN
Status: DISCONTINUED | OUTPATIENT
Start: 2018-06-19 | End: 2018-06-22 | Stop reason: HOSPADM

## 2018-06-19 RX ORDER — OXYCODONE HYDROCHLORIDE 5 MG/1
5 TABLET ORAL EVERY 2 HOUR PRN
Status: DISCONTINUED | OUTPATIENT
Start: 2018-06-19 | End: 2018-06-22 | Stop reason: HOSPADM

## 2018-06-19 RX ORDER — OXYCODONE HYDROCHLORIDE 5 MG/1
5 TABLET ORAL EVERY 4 HOURS PRN
Status: DISCONTINUED | OUTPATIENT
Start: 2018-06-19 | End: 2018-06-19

## 2018-06-19 RX ORDER — OXYCODONE HYDROCHLORIDE 5 MG/1
7.5 TABLET ORAL EVERY 4 HOURS PRN
Status: DISCONTINUED | OUTPATIENT
Start: 2018-06-19 | End: 2018-06-19

## 2018-06-19 RX ORDER — OXYCODONE HYDROCHLORIDE 5 MG/1
2.5 TABLET ORAL EVERY 6 HOURS
Status: DISCONTINUED | OUTPATIENT
Start: 2018-06-19 | End: 2018-06-20

## 2018-06-19 RX ORDER — LORAZEPAM 0.5 MG/1
0.5 TABLET ORAL EVERY 6 HOURS PRN
Status: DISCONTINUED | OUTPATIENT
Start: 2018-06-19 | End: 2018-06-22 | Stop reason: HOSPADM

## 2018-06-19 RX ORDER — DOCUSATE SODIUM 100 MG/1
100 CAPSULE, LIQUID FILLED ORAL 2 TIMES DAILY
Status: DISCONTINUED | OUTPATIENT
Start: 2018-06-19 | End: 2018-06-19

## 2018-06-19 RX ORDER — OLANZAPINE 5 MG/1
2.5 TABLET, ORALLY DISINTEGRATING ORAL 2 TIMES DAILY PRN
Status: DISCONTINUED | OUTPATIENT
Start: 2018-06-19 | End: 2018-06-22 | Stop reason: HOSPADM

## 2018-06-19 RX ORDER — POTASSIUM CHLORIDE 20MEQ/15ML
40 LIQUID (ML) ORAL ONCE
Status: COMPLETED | OUTPATIENT
Start: 2018-06-19 | End: 2018-06-19

## 2018-06-19 RX ORDER — POLYETHYLENE GLYCOL 3350 17 G/17G
17 POWDER, FOR SOLUTION ORAL DAILY
Status: DISCONTINUED | OUTPATIENT
Start: 2018-06-20 | End: 2018-06-22 | Stop reason: HOSPADM

## 2018-06-19 RX ADMIN — SUCRALFATE 1000 MG: 1 SUSPENSION ORAL at 11:37

## 2018-06-19 RX ADMIN — MEMANTINE HYDROCHLORIDE 5 MG: 5 TABLET ORAL at 17:17

## 2018-06-19 RX ADMIN — VANCOMYCIN HYDROCHLORIDE 125 MG: 10 INJECTION, POWDER, LYOPHILIZED, FOR SOLUTION INTRAVENOUS at 17:17

## 2018-06-19 RX ADMIN — DOCUSATE SODIUM 100 MG: 100 CAPSULE, LIQUID FILLED ORAL at 13:13

## 2018-06-19 RX ADMIN — CYANOCOBALAMIN TAB 500 MCG 500 MCG: 500 TAB at 08:23

## 2018-06-19 RX ADMIN — OXYCODONE HYDROCHLORIDE 5 MG: 5 TABLET ORAL at 02:37

## 2018-06-19 RX ADMIN — VANCOMYCIN HYDROCHLORIDE 125 MG: 10 INJECTION, POWDER, LYOPHILIZED, FOR SOLUTION INTRAVENOUS at 08:31

## 2018-06-19 RX ADMIN — LORAZEPAM 0.5 MG: 0.5 TABLET ORAL at 14:43

## 2018-06-19 RX ADMIN — HEPARIN SODIUM 21 UNITS/KG/HR: 10000 INJECTION, SOLUTION INTRAVENOUS at 17:17

## 2018-06-19 RX ADMIN — PANTOPRAZOLE SODIUM 40 MG: 40 TABLET, DELAYED RELEASE ORAL at 17:17

## 2018-06-19 RX ADMIN — OXYCODONE HYDROCHLORIDE 7.5 MG: 5 TABLET ORAL at 14:44

## 2018-06-19 RX ADMIN — HYDROMORPHONE HYDROCHLORIDE 0.5 MG: 1 INJECTION, SOLUTION INTRAMUSCULAR; INTRAVENOUS; SUBCUTANEOUS at 13:13

## 2018-06-19 RX ADMIN — OXYCODONE HYDROCHLORIDE 5 MG: 5 TABLET ORAL at 09:51

## 2018-06-19 RX ADMIN — Medication 250 MG: at 08:23

## 2018-06-19 RX ADMIN — ATENOLOL 100 MG: 50 TABLET ORAL at 08:22

## 2018-06-19 RX ADMIN — MEMANTINE HYDROCHLORIDE 5 MG: 5 TABLET ORAL at 08:23

## 2018-06-19 RX ADMIN — Medication 250 MG: at 17:17

## 2018-06-19 RX ADMIN — VANCOMYCIN HYDROCHLORIDE 125 MG: 10 INJECTION, POWDER, LYOPHILIZED, FOR SOLUTION INTRAVENOUS at 21:33

## 2018-06-19 RX ADMIN — QUETIAPINE FUMARATE 25 MG: 25 TABLET, FILM COATED ORAL at 21:31

## 2018-06-19 RX ADMIN — SUCRALFATE 1000 MG: 1 SUSPENSION ORAL at 05:31

## 2018-06-19 RX ADMIN — POTASSIUM CHLORIDE 40 MEQ: 20 SOLUTION ORAL at 11:36

## 2018-06-19 RX ADMIN — VANCOMYCIN HYDROCHLORIDE 125 MG: 10 INJECTION, POWDER, LYOPHILIZED, FOR SOLUTION INTRAVENOUS at 11:37

## 2018-06-19 RX ADMIN — SODIUM CHLORIDE 100 ML/HR: 0.9 INJECTION, SOLUTION INTRAVENOUS at 05:31

## 2018-06-19 RX ADMIN — SUCRALFATE 1000 MG: 1 SUSPENSION ORAL at 17:17

## 2018-06-19 RX ADMIN — PANTOPRAZOLE SODIUM 40 MG: 40 TABLET, DELAYED RELEASE ORAL at 05:31

## 2018-06-19 RX ADMIN — DONEPEZIL HYDROCHLORIDE 5 MG: 5 TABLET, FILM COATED ORAL at 21:31

## 2018-06-19 RX ADMIN — OXYCODONE HYDROCHLORIDE 2.5 MG: 5 TABLET ORAL at 21:31

## 2018-06-19 NOTE — PLAN OF CARE
Problem: OCCUPATIONAL THERAPY ADULT  Goal: Performs self-care activities at highest level of function for planned discharge setting  See evaluation for individualized goals  Treatment Interventions: ADL retraining, Functional transfer training, UE strengthening/ROM, Endurance training, Cognitive reorientation, Patient/family training, Equipment evaluation/education, Compensatory technique education, Continued evaluation, Energy conservation, Activityengagement          See flowsheet documentation for full assessment, interventions and recommendations  Outcome: Progressing  Limitation: Decreased ADL status, Decreased UE strength, Decreased Safe judgement during ADL, Decreased cognition, Decreased endurance, Decreased high-level ADLs, Decreased self-care trans  Prognosis: Fair  Assessment: Patient participated in skilled OT with focus on ADl skill training, repetitive feedback when performing routine tasks i e  brushing teeth, bathing, applying deodorant  Patient requires step by step and hands on direction depending on anticipated task to be performed  Patient pleasant throughout session, however, expressing concerns about "not knowing who or where she is " Responds well to repetitive feedback, however, unable to retain information  Patient would benefit from short term rehab with focus on increasing functional strength and independence skills for carryover into her daily routine        OT Discharge Recommendation: Short Term Rehab  OT - OK to Discharge: Yes (when medically cleared)   Genaro Redding

## 2018-06-19 NOTE — CONSULTS
Consultation - Palliative Care   Keren Lee 80 y o  female MRN: [id number]  Unit/Bed#: [facility] 525-01 Encounter: [id number]      Assessment/Plan     Assessment:  Patient Active Problem List   Diagnosis    Arterial occlusion    Atrial fibrillation ([facility] )    Noncompliance with treatment    Alzheimer's dementia with behavioral disturbance    Falls    Physical deconditioning    [place] (hard of hearing)    Melena    C  difficile diarrhea    Anemia    GI bleed    Hypertension    CKD (chronic kidney disease) stage 3, GFR 30-59 ml/min    GERD with esophagitis    PUD (peptic ulcer disease)    Weakness    History of ischemic left MCA stroke    SIRS (systemic inflammatory response syndrome) ([facility] )     Plan:  Goals of care: Patient is not competent to make her own decisions  Voicemail left for grandsandi Shah, who has served as primary family contact  Symptom management: pain of RLE, dementia w/ likely superimposed delirium, agitation   - start scheduled low-dose oxycodone 2 5mg q 6h with hold parameters   - continue oxycodone IR 5 - 7 5mg q 4h PRN   - hydromorphone IV PRN breakthrough pain   - lorazepam PRN (given age and delirium, may be better to avoid benzodiazapines; could consider low dose olanzapine PRN for agitation)   - continue quetiapine HS   - continue 1:1 sitter      History of Present Illness   Physician Requesting Consult: Kadeem Pool DO  Reason for Consult / Principal Problem: goals of care, pain  Hx and PE limited by: dementia, altered mental status  HPI: Keren Lee is a 80 y o  female who presented to [facility] on 6/14 from nursing facility after multiple falls and increasing weakness; on 6/16, was transferred to [facility] due to concern for ischemia of bilateral legs after BLE were found to be cool and pulseless  Started on heparin drip  Evaluated by vascular surgery   Underwent right ileo-femoral thromboembolectomy with arteriogram  Recommended at this point for amputation vs comfort measures  Patient has history of dementia, a-fib, CAD, CKD, HTN, PVD, multiple falls, recent c-diff infection, chronic MCA territory infarcts  Prior to this hospitalization, was last admitted 5/27 - 5/30 with c-diff and GIB (which was presumed to be related to colitis); anticoagulation was stopped at that time due to GIB  Per CM, patient has been resident at [facility] since July 2017  Karla Mistry has been primary contact  Per nursing home facesheet, patient does not have living will  Inpatient consult to Palliative Care  Consult performed by: Ghislaine Cuevas  Consult ordered by: Manolo Bell          Review of Systems   Unable to perform ROS: Dementia       Historical Information   Past Medical History:   Diagnosis Date    Alzheimer disease     Arterial occlusion     left leg    Atrial fibrillation ([address] )     Benign hypertension     Cardiac disease     Dementia     Hypertension     Renal disorder      Past Surgical History:   Procedure Laterality Date    ESOPHAGOGASTRODUODENOSCOPY N/A 5/29/2018    Procedure: ESOPHAGOGASTRODUODENOSCOPY (EGD);  pt has cdiff;  Surgeon: Garrett Eng MD;  Location: [facility]; Service: Gastroenterology    THROMBECTOMY W/ EMBOLECTOMY Right 6/16/2018    Procedure: RIGHT ILEO FEMORAL THROMBOEMBOLECTOMY   WITH COMPETION ARTERIOGRAM;  Surgeon: Joyce Zamarripa DO;  Location: [facility];  Service: Vascular     Social History     Social History    Marital status:       Spouse name: N/A    Number of children: N/A    Years of education: N/A     Social History Main Topics    Smoking status: Never Smoker    Smokeless tobacco: Never Used    Alcohol use No    Drug use: No    Sexual activity: Not Asked     Other Topics Concern    None     Social History Narrative    None     Family History   Problem Relation Age of Onset    Hypertension Mother        Meds/Allergies   all current active meds have been reviewed and current meds:   Current Facility-Administered Medications   Medication Dose Route Frequency    atenolol (TENORMIN) tablet 100 mg  100 mg Oral Daily    cyanocobalamin (VITAMIN B-12) tablet 500 mcg  500 mcg Oral Daily    docusate sodium (COLACE) capsule 100 mg  100 mg Oral BID    donepezil (ARICEPT) tablet 5 mg  5 mg Oral HS    heparin (porcine) 25,000 units in 250 mL infusion (premix)  3-30 Units/kg/hr (Order-Specific) Intravenous Titrated    heparin (porcine) injection 2,000 Units  2,000 Units Intravenous PRN    heparin (porcine) injection 4,000 Units  4,000 Units Intravenous PRN    HYDROmorphone (DILAUDID) injection 0 5 mg  0 5 mg Intravenous Q3H PRN    LORazepam (ATIVAN) tablet 0 5 mg  0 5 mg Oral Q8H PRN    memantine (NAMENDA) tablet 5 mg  5 mg Oral BID    ondansetron (ZOFRAN) injection 4 mg  4 mg Intravenous Q6H PRN    oxyCODONE (ROXICODONE) IR tablet 2 5 mg  2 5 mg Oral Q6H    oxyCODONE (ROXICODONE) IR tablet 5 mg  5 mg Oral Q4H PRN    oxyCODONE (ROXICODONE) IR tablet 7 5 mg  7 5 mg Oral Q4H PRN    pantoprazole (PROTONIX) EC tablet 40 mg  40 mg Oral BID AC    QUEtiapine (SEROquel) tablet 25 mg  25 mg Oral HS    saccharomyces boulardii (FLORASTOR) capsule 250 mg  250 mg Oral BID    sodium chloride 0 9 % infusion  100 mL/hr Intravenous Continuous    sucralfate (CARAFATE) oral suspension 1,000 mg  1,000 mg Oral Q6H [facility]    vancomycin (VANCOCIN) oral solution 125 mg  125 mg Oral 4x Daily         No Known Allergies    Objective     Physical Exam   Constitutional: She is cooperative  Frail elderly female   HENT:   Head: Normocephalic and atraumatic  Right Ear: External ear normal  Decreased hearing is noted  Left Ear: External ear normal  Decreased hearing is noted  Mouth/Throat: Mucous membranes are dry  Eyes: Conjunctivae and lids are normal    Neck: Trachea normal  Neck supple  Cardiovascular: Tachycardia present  Pulmonary/Chest: Effort normal    Neurological: She is alert   She is disoriented  Restless, delirious; answers simple questions   Skin: Skin is warm and dry  Right foot ruborous   Psychiatric: Her mood appears anxious  She is agitated  Cognition and memory are impaired  Lab Results: I have personally reviewed pertinent labs  Imaging Studies: I have personally reviewed pertinent reports  EKG, Pathology, and Other Studies: I have personally reviewed pertinent reports        Code Status: Level 3 - DNAR and DNI  Advance Directive and Living Will:      Power of :    POLST:

## 2018-06-19 NOTE — PROGRESS NOTES
Vascular Surgery    Progress Note - Shirley Poplar 3/28/1926, 80 y o  female MRN: [id number]    Unit/Bed#: [facility] 525-01 Encounter: [id number]    Primary Care Provider: No primary care provider on file  Date and time admitted to hospital: 6/16/2018  4:36 AM    * Arterial occlusion   Assessment & Plan    Acute/subacute RLE in setting of afib not on anticoagulation due to recent GI bleed and PAD    Asymptomatic Left SFA occlusion    ~Right Ileofemoral/SFA thrombectomy, completion Agram 6/16    Plan:  --Non salvageable RLE despite revascularization  Pt will require RLE amputation vs palliative care  --No intervention for L SFA occlusion as it is asymptomatic  --Continue Heparin gtt for now   --Pain control  --Palliative care consult pending          Subjective:  Pt c/o pain in right foot and that her feet are cold    Vitals:  /80 (BP Location: Right arm)   Pulse 104   Temp 98 °F (36 7 °C) (Oral)   Resp 18   Ht 5' 4" (1 626 m)   Wt 55 6 kg (122 lb 9 6 oz)   SpO2 94%   BMI 21 04 kg/m²     I/Os:  I/O last 3 completed shifts: In: 9086 [P O :1420; I V :110]  Out: 2200 [Urine:2200]  I/O this shift:   In: 4993 6 [P O :360; I V :4633 6]  Out: 2425 [Urine:2425]    Lab Results and Cultures:   Lab Results   Component Value Date    WBC 11 05 (H) 06/18/2018    HGB 10 2 (L) 06/18/2018    HCT 33 0 (L) 06/18/2018    MCV 92 06/18/2018     06/18/2018     Lab Results   Component Value Date    GLUCOSE 76 06/18/2018    CALCIUM 7 2 (L) 06/18/2018     06/18/2018    K 4 0 06/18/2018    CO2 18 (L) 06/18/2018     (H) 06/18/2018    BUN 20 06/18/2018    CREATININE 1 39 (H) 06/18/2018     Lab Results   Component Value Date    INR 1 24 (H) 06/16/2018    INR 1 22 (H) 05/27/2018    INR 2 64 (H) 07/03/2017    PROTIME 15 5 (H) 06/16/2018    PROTIME 15 3 (H) 05/27/2018    PROTIME 28 5 (H) 07/03/2017     Medications:  Current Facility-Administered Medications   Medication Dose Route Frequency    atenolol (TENORMIN) tablet 100 mg  100 mg Oral Daily    cyanocobalamin (VITAMIN B-12) tablet 500 mcg  500 mcg Oral Daily    donepezil (ARICEPT) tablet 5 mg  5 mg Oral HS    heparin (porcine) 25,000 units in 250 mL infusion (premix)  3-30 Units/kg/hr (Order-Specific) Intravenous Titrated    heparin (porcine) injection 2,000 Units  2,000 Units Intravenous PRN    heparin (porcine) injection 4,000 Units  4,000 Units Intravenous PRN    loperamide (IMODIUM) capsule 2 mg  2 mg Oral 4x Daily PRN    LORazepam (ATIVAN) tablet 0 5 mg  0 5 mg Oral Q8H PRN    memantine (NAMENDA) tablet 5 mg  5 mg Oral BID    ondansetron (ZOFRAN) injection 4 mg  4 mg Intravenous Q6H PRN    oxyCODONE (ROXICODONE) IR tablet 2 5 mg  2 5 mg Oral Q6H PRN    oxyCODONE (ROXICODONE) IR tablet 5 mg  5 mg Oral Q4H PRN    pantoprazole (PROTONIX) EC tablet 40 mg  40 mg Oral BID AC    QUEtiapine (SEROquel) tablet 25 mg  25 mg Oral HS    saccharomyces boulardii (FLORASTOR) capsule 250 mg  250 mg Oral BID    sodium chloride 0 9 % infusion  100 mL/hr Intravenous Continuous    sucralfate (CARAFATE) oral suspension 1,000 mg  1,000 mg Oral Q6H [facility]    vancomycin (VANCOCIN) oral solution 125 mg  125 mg Oral 4x Daily     Physical Exam:    General appearance: alert and oriented, in no acute distress  Lungs: clear to auscultation bilaterally  Chest wall: no tenderness  Heart: irregularly irregular rhythm  Abdomen: soft, non-tender; bowel sounds normal; no masses,  no organomegaly  Extremities: Right foot without motor or sensation, ruborous, skin tear at shin stable  Left foot M/S intact      Wound/Incision:  Right groin incision clean, dry, and intact    Pulse exam:    PT:  Left: doppler signal      Kandy Sadler PA-C  6/19/2018

## 2018-06-19 NOTE — SOCIAL WORK
Call received from JASMIN Brumfield after she spoke to Rebecca soto  They spoke about hospice services and patient is made level 4 comfort care  They discussed snf care versus return to [facility] and CM was asked to call family  [address] [phone] and spoke to [facility]  They can accept resident back with decline in care if family is accepting of hospice services  They generally use [facility] or [address] [place] [address]  CM will fax records to [facility] for  [facility] to review (fax # [phone]) and they will inform CM if they need to do on-site evaluation and if they can accept patient  Called Rebecca soto, [phone], to discuss above  He would prefer patient to return to [facility] if they can accept her  He questions if there will be increase in cost of care and will discuss that with the facility

## 2018-06-19 NOTE — ASSESSMENT & PLAN NOTE
Acute/subacute RLE in setting of afib not on anticoagulation due to recent GI bleed and PAD    Asymptomatic Left SFA occlusion    ~Right Ileofemoral/SFA thrombectomy, completion Agram 6/16    Plan:  --Non salvageable RLE despite revascularization   Pt will require RLE amputation vs palliative care  --No intervention for L SFA occlusion as it is asymptomatic  --Continue Heparin gtt for now   --Pain control  --Palliative care consult pending

## 2018-06-19 NOTE — PROGRESS NOTES
Addendum: Received call back from patient's grandson Meliton Sahu - He has decided against amputation  He is in agreement with transition to comfort care  Will change to level 4 and consult hospice  Patient will need higher level of care upon discharge  Will plan for SNF with hospice  Meliton Sahu is out of town and won't be able to visit until about Monday night 6/25  He would prefer for patient to not be discharged until he has returned  He is agreeable to reviewing list of facilities from  and possibly providing choices for referrals  Spoke with FRANCE

## 2018-06-19 NOTE — OCCUPATIONAL THERAPY NOTE
Occupational Therapy Treatment Note:       06/19/18 1053   Pain Assessment   Pain Assessment FLACC   Pain Score No Pain   Sinclair-Baker FACES Pain Rating 4   Pain Location Leg   Pain Orientation Right   Hospital Pain Intervention(s) Repositioned  (attempting side sit position in bed)   Pain Rating: FLACC (Rest) - Face 0   Pain Rating: FLACC (Rest) - Legs 0   Pain Rating: FLACC (Rest) - Activity 0   Pain Rating: FLACC (Rest) - Cry 0   Pain Rating: FLACC (Rest) - Consolability 0   Score: FLACC (Rest) 0   Pain Rating: FLACC (Activity) - Face 1   Pain Rating: FLACC (Activity) - Legs 1   Pain Rating: FLACC (Activity) - Activity 1   Pain Rating: FLACC (Activity) - Cry 1   Pain Rating: FLACC (Activity) - Consolability 1   Score: FLACC (Activity) 5   ADL   Where Assessed Supine, bed   Grooming Assistance 4  Minimal Assistance   Grooming Deficit Setup;Verbal cueing; Increased time to complete; Teeth care   UB Bathing Assistance 3  Moderate Assistance   UB Bathing Deficit Setup;Verbal cueing;Supervision/safety; Increased time to complete; Chest;Right arm;Left arm   LB Dressing Assistance 2  Maximal Assistance   LB Dressing Deficit Verbal cueing;Supervision/safety; Increased time to complete; Don/doff R sock; Don/doff L sock   Toileting Comments pt had catheter   Bed Mobility   Supine to Sit 3  Moderate assistance   Additional items (attempted, however, discontinued secondary pain)   Cognition   Overall Cognitive Status Impaired   Arousal/Participation Alert; Cooperative   Attention Attends with cues to redirect   Orientation Level Oriented to person   Memory Decreased short term memory;Decreased recall of recent events;Decreased recall of precautions   Following Commands Follows one step commands with increased time or repetition   Comments Patient has baseline dementia; requires repetitive vc's throughout session   Activity Tolerance   Activity Tolerance Patient limited by pain   Medical Staff Made Aware ok to see per DAYANARA Pendleton Assessment   Assessment Patient participated in skilled OT with focus on ADl skill training, repetitive feedback when performing routine tasks i e  brushing teeth, bathing, applying deodorant  Patient requires step by step and hands on direction depending on anticipated task to be performed  Patient pleasant throughout session, however, expressing concerns about "not knowing who or where she is " Responds well to repetitive feedback, however, unable to retain information  Patient would benefit from short term rehab with focus on increasing functional strength and independence skills for carryover into her daily routine  Plan   Treatment Interventions ADL retraining; Endurance training   Goal Expiration Date 06/26/18   Treatment Day 1   OT Frequency 3-5x/wk   Recommendation   OT Discharge Recommendation Short Term Rehab   OT - OK to Discharge Yes  (when medically cleared)   Barthel Index   Feeding 5   Bathing 0   Grooming Score 0   Dressing Score 5   Bladder Score 0   Bowels Score 5   Toilet Use Score 5   Transfers (Bed/Chair) Score 5   Mobility (Level Surface) Score 0   Stairs Score 0   Barthel Index Score 25   Modified [place] Scale   Modified Terri Scale 4   Tri Mclaughlin, MACEY

## 2018-06-19 NOTE — PLAN OF CARE
Problem: Potential for Falls  Goal: Patient will remain free of falls  INTERVENTIONS:  - Assess patient frequently for physical needs  -  Identify cognitive and physical deficits and behaviors that affect risk of falls    -  [place] fall precautions as indicated by assessment   - Educate patient/family on patient safety including physical limitations  - Instruct patient to call for assistance with activity based on assessment  - Modify environment to reduce risk of injury  - Consider OT/PT consult to assist with strengthening/mobility   Outcome: Progressing      Problem: Prexisting or High Potential for Compromised Skin Integrity  Goal: Skin integrity is maintained or improved  INTERVENTIONS:  - Identify patients at risk for skin breakdown  - Assess and monitor skin integrity  - Assess and monitor nutrition and hydration status  - Monitor labs (i e  albumin)  - Assess for incontinence   - Turn and reposition patient  - Assist with mobility/ambulation  - Relieve pressure over bony prominences  - Avoid friction and shearing  - Provide appropriate hygiene as needed including keeping skin clean and dry  - Evaluate need for skin moisturizer/barrier cream  - Collaborate with interdisciplinary team (i e  Nutrition, Rehabilitation, etc )   - Patient/family teaching   Outcome: Progressing      Problem: PAIN - ADULT  Goal: Verbalizes/displays adequate comfort level or baseline comfort level  Interventions:  - Encourage patient to monitor pain and request assistance  - Assess pain using appropriate pain scale  - Administer analgesics based on type and severity of pain and evaluate response  - Implement non-pharmacological measures as appropriate and evaluate response  - Consider cultural and social influences on pain and pain management  - Notify physician/advanced practitioner if interventions unsuccessful or patient reports new pain   Outcome: Progressing      Problem: SAFETY ADULT  Goal: Maintain or return to baseline ADL function  INTERVENTIONS:  -  Assess patient's ability to carry out ADLs; assess patient's baseline for ADL function and identify physical deficits which impact ability to perform ADLs (bathing, care of mouth/teeth, toileting, grooming, dressing, etc )  - Assess/evaluate cause of self-care deficits   - Assess range of motion  - Assess patient's mobility; develop plan if impaired  - Assess patient's need for assistive devices and provide as appropriate  - Encourage maximum independence but intervene and supervise when necessary  ¯ Involve family in performance of ADLs  ¯ Assess for home care needs following discharge   ¯ Request OT consult to assist with ADL evaluation and planning for discharge  ¯ Provide patient education as appropriate   Outcome: Progressing    Goal: Maintain or return mobility status to optimal level  INTERVENTIONS:  - Assess patient's baseline mobility status (ambulation, transfers, stairs, etc )    - Identify cognitive and physical deficits and behaviors that affect mobility  - Identify mobility aids required to assist with transfers and/or ambulation (gait belt, sit-to-stand, lift, walker, cane, etc )  - [place] fall precautions as indicated by assessment  - Record patient progress and toleration of activity level on Mobility SBAR; progress patient to next Phase/Stage  - Instruct patient to call for assistance with activity based on assessment  - Request Rehabilitation consult to assist with strengthening/weightbearing, etc    Outcome: Progressing      Problem: Knowledge Deficit  Goal: Patient/family/caregiver demonstrates understanding of disease process, treatment plan, medications, and discharge instructions  Complete learning assessment and assess knowledge base    Interventions:  - Provide teaching at level of understanding  - Provide teaching via preferred learning methods   Outcome: Progressing      Problem: SKIN/TISSUE INTEGRITY - ADULT  Goal: Skin integrity remains intact  INTERVENTIONS  - Identify patients at risk for skin breakdown  - Assess and monitor skin integrity  - Assess and monitor nutrition and hydration status  - Monitor labs (i e  albumin)  - Assess for incontinence   - Turn and reposition patient  - Assist with mobility/ambulation  - Relieve pressure over bony prominences  - Avoid friction and shearing  - Provide appropriate hygiene as needed including keeping skin clean and dry  - Evaluate need for skin moisturizer/barrier cream  - Collaborate with interdisciplinary team (i e  Nutrition, Rehabilitation, etc )   - Patient/family teaching   Outcome: Progressing      Problem: MUSCULOSKELETAL - ADULT  Goal: Maintain or return mobility to safest level of function  INTERVENTIONS:  - Assess patient's ability to carry out ADLs; assess patient's baseline for ADL function and identify physical deficits which impact ability to perform ADLs (bathing, care of mouth/teeth, toileting, grooming, dressing, etc )  - Assess/evaluate cause of self-care deficits   - Assess range of motion  - Assess patient's mobility; develop plan if impaired  - Assess patient's need for assistive devices and provide as appropriate  - Encourage maximum independence but intervene and supervise when necessary  - Involve family in performance of ADLs  - Assess for home care needs following discharge   - Request OT consult to assist with ADL evaluation and planning for discharge  - Provide patient education as appropriate   Outcome: Progressing    Goal: Maintain proper alignment of affected body part  INTERVENTIONS:  - Support, maintain and protect limb and body alignment  - Provide pt/fam with appropriate education   Outcome: Progressing

## 2018-06-20 PROCEDURE — 99232 SBSQ HOSP IP/OBS MODERATE 35: CPT | Performed by: NURSE PRACTITIONER

## 2018-06-20 PROCEDURE — 99232 SBSQ HOSP IP/OBS MODERATE 35: CPT | Performed by: GENERAL PRACTICE

## 2018-06-20 RX ORDER — OXYCODONE HYDROCHLORIDE 5 MG/1
2.5 TABLET ORAL EVERY 4 HOURS
Status: DISCONTINUED | OUTPATIENT
Start: 2018-06-20 | End: 2018-06-22 | Stop reason: HOSPADM

## 2018-06-20 RX ORDER — HEPARIN SODIUM 10000 [USP'U]/100ML
3-30 INJECTION, SOLUTION INTRAVENOUS
Status: DISCONTINUED | OUTPATIENT
Start: 2018-06-20 | End: 2018-06-20

## 2018-06-20 RX ORDER — HEPARIN SODIUM 10000 [USP'U]/100ML
3-30 INJECTION, SOLUTION INTRAVENOUS
Status: DISCONTINUED | OUTPATIENT
Start: 2018-06-20 | End: 2018-06-22 | Stop reason: HOSPADM

## 2018-06-20 RX ORDER — AMOXICILLIN 250 MG
2 CAPSULE ORAL 2 TIMES DAILY
Status: DISCONTINUED | OUTPATIENT
Start: 2018-06-20 | End: 2018-06-22 | Stop reason: HOSPADM

## 2018-06-20 RX ADMIN — OXYCODONE HYDROCHLORIDE 10 MG: 10 TABLET ORAL at 07:37

## 2018-06-20 RX ADMIN — OXYCODONE HYDROCHLORIDE 2.5 MG: 5 TABLET ORAL at 14:22

## 2018-06-20 RX ADMIN — OXYCODONE HYDROCHLORIDE 5 MG: 5 TABLET ORAL at 16:55

## 2018-06-20 RX ADMIN — VANCOMYCIN HYDROCHLORIDE 125 MG: 10 INJECTION, POWDER, LYOPHILIZED, FOR SOLUTION INTRAVENOUS at 12:14

## 2018-06-20 RX ADMIN — ONDANSETRON 4 MG: 2 INJECTION, SOLUTION INTRAMUSCULAR; INTRAVENOUS at 21:55

## 2018-06-20 RX ADMIN — Medication 2 TABLET: at 18:34

## 2018-06-20 RX ADMIN — VANCOMYCIN HYDROCHLORIDE 125 MG: 10 INJECTION, POWDER, LYOPHILIZED, FOR SOLUTION INTRAVENOUS at 08:49

## 2018-06-20 RX ADMIN — Medication 2 TABLET: at 12:24

## 2018-06-20 RX ADMIN — SUCRALFATE 1000 MG: 1 SUSPENSION ORAL at 12:14

## 2018-06-20 RX ADMIN — PANTOPRAZOLE SODIUM 40 MG: 40 TABLET, DELAYED RELEASE ORAL at 16:55

## 2018-06-20 RX ADMIN — DONEPEZIL HYDROCHLORIDE 5 MG: 5 TABLET, FILM COATED ORAL at 21:10

## 2018-06-20 RX ADMIN — ATENOLOL 100 MG: 50 TABLET ORAL at 08:48

## 2018-06-20 RX ADMIN — VANCOMYCIN HYDROCHLORIDE 125 MG: 10 INJECTION, POWDER, LYOPHILIZED, FOR SOLUTION INTRAVENOUS at 18:40

## 2018-06-20 RX ADMIN — SUCRALFATE 1000 MG: 1 SUSPENSION ORAL at 18:34

## 2018-06-20 RX ADMIN — MEMANTINE HYDROCHLORIDE 5 MG: 5 TABLET ORAL at 08:48

## 2018-06-20 RX ADMIN — OXYCODONE HYDROCHLORIDE 2.5 MG: 5 TABLET ORAL at 18:34

## 2018-06-20 RX ADMIN — Medication 250 MG: at 18:34

## 2018-06-20 RX ADMIN — OXYCODONE HYDROCHLORIDE 2.5 MG: 5 TABLET ORAL at 08:48

## 2018-06-20 RX ADMIN — QUETIAPINE FUMARATE 25 MG: 25 TABLET, FILM COATED ORAL at 21:10

## 2018-06-20 RX ADMIN — MEMANTINE HYDROCHLORIDE 5 MG: 5 TABLET ORAL at 18:40

## 2018-06-20 RX ADMIN — HEPARIN SODIUM AND DEXTROSE 21 UNITS/KG/HR: 10000; 5 INJECTION INTRAVENOUS at 12:18

## 2018-06-20 RX ADMIN — PANTOPRAZOLE SODIUM 40 MG: 40 TABLET, DELAYED RELEASE ORAL at 05:23

## 2018-06-20 RX ADMIN — POLYETHYLENE GLYCOL 3350 17 G: 17 POWDER, FOR SOLUTION ORAL at 08:49

## 2018-06-20 RX ADMIN — Medication 250 MG: at 08:48

## 2018-06-20 RX ADMIN — SODIUM CHLORIDE 100 ML/HR: 0.9 INJECTION, SOLUTION INTRAVENOUS at 11:40

## 2018-06-20 RX ADMIN — OXYCODONE HYDROCHLORIDE 2.5 MG: 5 TABLET ORAL at 21:09

## 2018-06-20 RX ADMIN — OXYCODONE HYDROCHLORIDE 5 MG: 5 TABLET ORAL at 23:32

## 2018-06-20 RX ADMIN — SUCRALFATE 1000 MG: 1 SUSPENSION ORAL at 05:23

## 2018-06-20 RX ADMIN — OXYCODONE HYDROCHLORIDE 5 MG: 5 TABLET ORAL at 05:31

## 2018-06-20 RX ADMIN — VANCOMYCIN HYDROCHLORIDE 125 MG: 10 INJECTION, POWDER, LYOPHILIZED, FOR SOLUTION INTRAVENOUS at 21:09

## 2018-06-20 NOTE — PROGRESS NOTES
06/20/18 0900   Clinical Encounter Type   Visited With Patient   Routine Visit Follow-up  (Comfort Care)   Crisis Visit Patient actively dying   Referral From Departmental follow-up   Referral To

## 2018-06-20 NOTE — SOCIAL WORK
Spoke to Mauriico Salamanca at [facility] after she reviewed clinical records  They can accept patient back to [facility] after arrangements are made for hospice care  She spoke to angelica who accept [address] [facility] [address]  Script for hospice evaluate and treat was faxed to [facility]  Received call from DIANNE Akers with [address] [facility] [address] [phone]  She requests clinical records which were faxed to her at fax [phone]  They will review and order DME-hospital bed, oxygen, wc and over bed table  If DME can be delivered patient may be able to discharge Thursday 6/21/18  Joaquina will call CM in the morning  PCM may be asked to write scripts for hospice meds

## 2018-06-20 NOTE — PROGRESS NOTES
Progress Note - Palliative & Supportive Care  Bob Johnson  80 y o   female  PPHP 525/PPHP 525-01   MRN: [id number]  Encounter: [id number]     Assessment  Patient Active Problem List   Diagnosis    Arterial occlusion    Atrial fibrillation ([address] )    Noncompliance with treatment    Alzheimer's dementia with behavioral disturbance    Falls    Physical deconditioning    [place] (hard of hearing)    Melena    C  difficile diarrhea    Anemia    GI bleed    Hypertension    CKD (chronic kidney disease) stage 3, GFR 30-59 ml/min    GERD with esophagitis    PUD (peptic ulcer disease)    Weakness    History of ischemic left MCA stroke    SIRS (systemic inflammatory response syndrome) ([facility])       Plan:  Symptom management: pain of RLE, dementia w/ likely superimposed delirium, agitation              - change oxycodone IR to 2 5mg q 4h              - oxycodone IR 5 - 10mg q 2h PRN              - hydromorphone IV PRN breakthrough pain              - lorazepam 0 5mg q 6h PRN anxiety   - olanzapine 2 5mg ODT BID PRN agitation              - continue quetiapine HS              - continue 1:1 sitter   - continue heparin drip for comfort; will plan to d/c upon discharge and likely start aspirin    Goals of Care:    - level 4 comfort care   - plan for hospice in nursing facility,  arranging    History  Patient continues to be very delirious  1:1 sitter at bedside  Currently sleeping  Severe RLE pain late yesterday, meds were adjusted       PRN Use of Pain Medications in past 24h: 1 dose of IV hydromorphone, 2 doses of oxycodone    Meds  all current active meds have been reviewed and current meds:   Current Facility-Administered Medications   Medication Dose Route Frequency    atenolol (TENORMIN) tablet 100 mg  100 mg Oral Daily    donepezil (ARICEPT) tablet 5 mg  5 mg Oral HS    heparin (porcine) 25,000 units in 250 mL infusion (premix)  3-30 Units/kg/hr (Order-Specific) Intravenous Titrated    HYDROmorphone (DILAUDID) injection 0 5 mg  0 5 mg Intravenous Q3H PRN    LORazepam (ATIVAN) tablet 0 5 mg  0 5 mg Oral Q6H PRN    memantine (NAMENDA) tablet 5 mg  5 mg Oral BID    OLANZapine (ZyPREXA ZYDIS) dispersible tablet 2 5 mg  2 5 mg Oral BID PRN    ondansetron (ZOFRAN) injection 4 mg  4 mg Intravenous Q6H PRN    oxyCODONE (ROXICODONE) immediate release tablet 10 mg  10 mg Oral Q2H PRN    oxyCODONE (ROXICODONE) IR tablet 2 5 mg  2 5 mg Oral Q6H    oxyCODONE (ROXICODONE) IR tablet 5 mg  5 mg Oral Q2H PRN    pantoprazole (PROTONIX) EC tablet 40 mg  40 mg Oral BID AC    polyethylene glycol (MIRALAX) packet 17 g  17 g Oral Daily    QUEtiapine (SEROquel) tablet 25 mg  25 mg Oral HS    saccharomyces boulardii (FLORASTOR) capsule 250 mg  250 mg Oral BID    senna-docusate sodium (SENOKOT S) 8 6-50 mg per tablet 2 tablet  2 tablet Oral BID    sucralfate (CARAFATE) oral suspension 1,000 mg  1,000 mg Oral Q6H [address]    vancomycin (VANCOCIN) oral solution 125 mg  125 mg Oral 4x Daily       No Known Allergies    Objective  Physical Exam   Constitutional: She is sleeping  She is easily aroused  No distress  Frail elderly   Pulmonary/Chest: Effort normal    Musculoskeletal:   Right foot ruborous   Neurological: She is easily aroused  Skin: Skin is warm and dry  Psychiatric: Cognition and memory are impaired             Marvin Jones, [address] [place] [address]  Office number: [phone]

## 2018-06-20 NOTE — PROGRESS NOTES
06/20/18 0900   Plan of Care   Comments Visited with pt who suffer with altered mental status  No family present at visited  Assessment Completed by:  Other (Comment)  (Dept Referral (Comfort Care))

## 2018-06-20 NOTE — PROGRESS NOTES
Conducted rounding with Dr Fior Tay, plan of care reviewed, Heparin gtt restarted as palliative measure per family request, per MD check only daily PTT starting tomorrow

## 2018-06-20 NOTE — PROGRESS NOTES
06/20/18 [address] [facility]   Spiritual Beliefs/Perceptions   Support Systems Family members;Home care staff   Coping Responses   Patient Coping Open/discussion   Plan of Care   Comments Established relationship of care and support; provided encouragement and support   Assessment Completed by: Unit visit

## 2018-06-20 NOTE — PROGRESS NOTES
[facility] Progress Note  Patient: Eufemia Bahena 80 y o  female   MRN: [id number]  PCP: No primary care provider on file  Unit/Bed#: [facility] 525-01 Encounter: [id number]  Date Of Visit: 06/20/18    Assessment:    Principal Problem:    Arterial occlusion  Active Problems:    Atrial fibrillation (HCC)    Alzheimer's dementia with behavioral disturbance    Physical deconditioning    C  difficile diarrhea    History of ischemic left MCA stroke    SIRS (systemic inflammatory response syndrome) ([facility])      Plan:    · Right Lower Extremity Ischemia / PAD -   ? Heparin drip   Monitor for any bleeding given GI bleed on 5/27/2018   ? CTA study done 6/16/2018: Right common femoral artery and SFA occluded   Minimal reconstitution of isolated segments of the right popliteal artery   No significant right infrapopliteal arterial flow  Interval occlusion of the left SFA since the intervention of June 2017   Collateral reconstitution of severely diseased left popliteal artery   No continuous vessel left lower extremity runoff   Multiple collaterals  High-grade, greater than 75% ostial stenosis of single patent renal artery, bilaterally   Bilateral kidney atrophy, left worse than right     ? 6/19 - Family decided on comfort care  ? 6/20 - Will keep heparin GTT going while inpt per palliative for comfort  Will check daily PTT, even if adjustement to heparin GTT made  Although pt had recent GIB, no need to check CBC  ? Pain Control -   ? Scheduled tylenol and oxycodone  ? Oxycodone and dilaudid prn  · SIRS POA - treat PAD and treat the C-diff colitis (prehospital)   Monitor vitals and labs   Improved  · Essential Hypertension -   · Valsartan stopped 6/16/2018   Will stop the amlodipine as well  · Alzheimer Dementia with Behavioral Disturbances -   ? Continue Aricept and Namenda  ? For behaviors, continue seroquel qhs and PRN ativan  ? Palliative added prn Zyprexa ODT  ?  Patient is on a 1-1 observation which will be continued  · C-diff Colitis (POA) - continue on oral vancomycin   Due to complete treatment on 6/25/2018   Monitor BMs and hydration status  · History of Stroke -   ? Antiplatelet / Anticoagulation - heparin drip  ? Statin - None currently  · Atrial Fibrillation -   ? Rate control - Increased atenolol  ? Anticoagulation - heparin drip currently   Has had stroke before and also ambulatory dysfunction places a higher risk for bleed   Deemed by outpatient cardiologist to be poor candidate for anticoagulation due to advanced age and bleeding risk (GI bleed, falls, etc)  · GERD - PPI therapy   Monitor for symptoms  · Ambulatory dysfunction - PT / OT evaluations recommend acute inpatient rehab post discharge  However, on 6/19 , decision for hospice made  · Goals of Care -   ? 6/17/2018 - discussed goals of care with grandson, Roberto Falcon, on phone 6/18 in light of the limb ischemia in patient with known advanced dementia and advanced age   Options of surgery followed by rehab and wound care course vs  No surgery / Palliative care given  Ruthie Price is unsure of which way to proceed and asks to be able to wait a week till he is back in town and able to discuss with her before coming up with a decision   While I expressed to him that we are not so urgently looking for that decision today, that the longer we wait for a decision (if surgery is decided upon) then the more damage that is likely to occur  Rick Mau will talk to him again tomorrow    ? 6/18/2018 - spoke to Roberto Falcon more about goals of care and that patient would be higher risk not just for surgery but for post operative complications and falls after surgery due to severe dementia   Discussed that a palliative care consult is two fold in that they can work to help treat pain symptoms but also review options for conservative care measures as well including comfort based care approach  Ruthie Price has not agreed to hospice, but would be interested to hear from palliative care team   ?  - Je Wolfe decided on comfort care  ?  - plan to d/c pt back to [facility] w/ hospice       VTE Pharmacologic Prophylaxis:   Pharmacologic: Heparin Drip  Mechanical VTE Prophylaxis in Place: No    Patient Centered Rounds: I have performed bedside rounds with nursing staff today  Discussions with Specialists or Other Care Team Provider: Lora Loving from palliative    Education and Discussions with Family / Patient: pt  Attempted to call son but no answer    Time Spent for Care: 30 minutes  More than 50% of total time spent on counseling and coordination of care as described above  Current Length of Stay: 4 day(s)    Current Patient Status: Inpatient   Certification Statement: The patient will continue to require additional inpatient hospital stay due to need for d/c planning    Discharge Plan / Estimated Discharge Date: when [facility] can accept pt back w/ hospice    Code Status: Level 4 - Comfort Care      Subjective:   C/o right foot pain    Objective:     Vitals:   Temp (24hrs), Av 4 °F (37 4 °C), Min:99 4 °F (37 4 °C), Max:99 4 °F (37 4 °C)    HR:  [83-84] 83  BP: (106-170)/(75-78) 170/75  SpO2:  [93 %-94 %] 93 %  Body mass index is 21 04 kg/m²  Input and Output Summary (last 24 hours): Intake/Output Summary (Last 24 hours) at 18 1221  Last data filed at 18 0700   Gross per 24 hour   Intake           2901 5 ml   Output             3675 ml   Net           -773 5 ml       Physical Exam:     Physical Exam   Constitutional: No distress  HENT:   Head: Normocephalic and atraumatic  Eyes: Conjunctivae and EOM are normal    Neck: Normal range of motion  Neck supple  Cardiovascular: Normal rate and regular rhythm  Pulmonary/Chest: Effort normal and breath sounds normal  She has no wheezes  She has no rales  Abdominal: Soft  Bowel sounds are normal  She exhibits no distension  There is no tenderness  Musculoskeletal: Normal range of motion  She exhibits no edema  Neurological: She is alert  Ox1   Skin: She is not diaphoretic  RLE tender         Additional Data:     Labs:      Results from last 7 days  Lab Units 06/19/18  0523 06/18/18  0507 06/17/18  0437   WBC Thousand/uL 9 99 11 05* 15 56*   HEMOGLOBIN g/dL 10 3* 10 2* 11 4*   HEMATOCRIT % 32 8* 33 0* 35 4   PLATELETS Thousands/uL 245 251 328   NEUTROS PCT %  --   --  91*   LYMPHS PCT %  --   --  4*   LYMPHO PCT %  --  10*  --    MONOS PCT %  --   --  4   MONO PCT MAN %  --  4  --    EOS PCT %  --   --  0   EOSINO PCT MANUAL %  --  0  --        Results from last 7 days  Lab Units 06/19/18  0523   SODIUM mmol/L 140   POTASSIUM mmol/L 3 3*   CHLORIDE mmol/L 108   CO2 mmol/L 22   BUN mg/dL 14   CREATININE mg/dL 1 05   CALCIUM mg/dL 7 7*   GLUCOSE RANDOM mg/dL 91       Results from last 7 days  Lab Units 06/16/18  0040   INR  1 24*       * I Have Reviewed All Lab Data Listed Above  * Additional Pertinent Lab Tests Reviewed:  [facility] Review[facility]        Recent Cultures (last 7 days):           Last 24 Hours Medication List:     Current Facility-Administered Medications:  atenolol 100 mg Oral Daily Freda Botello DO    donepezil 5 mg Oral HS Jose Dickey MD    heparin (porcine) 3-30 Units/kg/hr (Order-Specific) Intravenous Titrated Robinson Pope DO Last Rate: 21 Units/kg/hr (06/20/18 1218)   HYDROmorphone 0 5 mg Intravenous Q3H PRN Robinson Pope DO    LORazepam 0 5 mg Oral Q6H PRN SEA Zhao    memantine 5 mg Oral BID Jose Dickey MD    OLANZapine 2 5 mg Oral BID PRN SEA Zhao    ondansetron 4 mg Intravenous Q6H PRN Freda Botello DO    oxyCODONE 10 mg Oral Q2H PRN Toyin Jeff, SEA    oxyCODONE 2 5 mg Oral Q6H Toyin Jeff, MARCIANP    oxyCODONE 5 mg Oral Q2H PRN Toyin Jeff, SEA    pantoprazole 40 mg Oral BID AC Jose Dickey MD    polyethylene glycol 17 g Oral Daily SEA Zaho    QUEtiapine 25 mg Oral HS MD isidro Calzada mg Oral BID Shantell Delvalle MD    senna-docusate sodium 2 tablet Oral BID Marcio Crandall DO    sucralfate 1,000 mg Oral Q6H [address] Shantell Delvalle MD    vancomycin 125 mg Oral 4x Daily Shantell Delvalle MD         Today, Patient Was Seen By: Marcio Crandall DO    ** Please Note: This note has been constructed using a voice recognition system   **

## 2018-06-21 LAB — APTT PPP: 59 SECONDS (ref 24–36)

## 2018-06-21 PROCEDURE — 99232 SBSQ HOSP IP/OBS MODERATE 35: CPT | Performed by: GENERAL PRACTICE

## 2018-06-21 PROCEDURE — 85730 THROMBOPLASTIN TIME PARTIAL: CPT | Performed by: GENERAL PRACTICE

## 2018-06-21 PROCEDURE — 99231 SBSQ HOSP IP/OBS SF/LOW 25: CPT | Performed by: NURSE PRACTITIONER

## 2018-06-21 RX ORDER — OXYCODONE HYDROCHLORIDE 5 MG/1
5 TABLET ORAL EVERY 2 HOUR PRN
Qty: 15 TABLET | Refills: 0 | Status: SHIPPED | OUTPATIENT
Start: 2018-06-21

## 2018-06-21 RX ORDER — ASPIRIN 81 MG/1
81 TABLET, CHEWABLE ORAL DAILY
Qty: 8 TABLET | Refills: 0 | Status: SHIPPED | OUTPATIENT
Start: 2018-06-21

## 2018-06-21 RX ORDER — OXYCODONE HYDROCHLORIDE 10 MG/1
10 TABLET ORAL EVERY 2 HOUR PRN
Qty: 10 TABLET | Refills: 0 | Status: SHIPPED | OUTPATIENT
Start: 2018-06-21

## 2018-06-21 RX ORDER — OLANZAPINE 5 MG/1
2.5 TABLET, ORALLY DISINTEGRATING ORAL 2 TIMES DAILY PRN
Qty: 15 TABLET | Refills: 0 | Status: SHIPPED | OUTPATIENT
Start: 2018-06-21

## 2018-06-21 RX ORDER — LORAZEPAM 0.5 MG/1
0.5 TABLET ORAL EVERY 6 HOURS PRN
Qty: 10 TABLET | Refills: 0 | Status: SHIPPED | OUTPATIENT
Start: 2018-06-21 | End: 2018-07-01

## 2018-06-21 RX ORDER — QUETIAPINE FUMARATE 25 MG/1
25 TABLET, FILM COATED ORAL
Qty: 8 TABLET | Refills: 0 | Status: SHIPPED | OUTPATIENT
Start: 2018-06-21

## 2018-06-21 RX ORDER — OXYCODONE HYDROCHLORIDE 5 MG/1
2.5 TABLET ORAL EVERY 4 HOURS
Qty: 20 TABLET | Refills: 0 | Status: SHIPPED | OUTPATIENT
Start: 2018-06-21

## 2018-06-21 RX ADMIN — VANCOMYCIN HYDROCHLORIDE 125 MG: 10 INJECTION, POWDER, LYOPHILIZED, FOR SOLUTION INTRAVENOUS at 12:47

## 2018-06-21 RX ADMIN — SUCRALFATE 1000 MG: 1 SUSPENSION ORAL at 17:16

## 2018-06-21 RX ADMIN — VANCOMYCIN HYDROCHLORIDE 125 MG: 10 INJECTION, POWDER, LYOPHILIZED, FOR SOLUTION INTRAVENOUS at 21:39

## 2018-06-21 RX ADMIN — MEMANTINE HYDROCHLORIDE 5 MG: 5 TABLET ORAL at 09:04

## 2018-06-21 RX ADMIN — OXYCODONE HYDROCHLORIDE 2.5 MG: 5 TABLET ORAL at 15:23

## 2018-06-21 RX ADMIN — OXYCODONE HYDROCHLORIDE 2.5 MG: 5 TABLET ORAL at 17:16

## 2018-06-21 RX ADMIN — SUCRALFATE 1000 MG: 1 SUSPENSION ORAL at 12:47

## 2018-06-21 RX ADMIN — PANTOPRAZOLE SODIUM 40 MG: 40 TABLET, DELAYED RELEASE ORAL at 05:36

## 2018-06-21 RX ADMIN — HEPARIN SODIUM AND DEXTROSE 21 UNITS/KG/HR: 10000; 5 INJECTION INTRAVENOUS at 05:36

## 2018-06-21 RX ADMIN — VANCOMYCIN HYDROCHLORIDE 125 MG: 10 INJECTION, POWDER, LYOPHILIZED, FOR SOLUTION INTRAVENOUS at 17:28

## 2018-06-21 RX ADMIN — ATENOLOL 100 MG: 50 TABLET ORAL at 09:04

## 2018-06-21 RX ADMIN — Medication 250 MG: at 09:04

## 2018-06-21 RX ADMIN — Medication 2 TABLET: at 09:04

## 2018-06-21 RX ADMIN — DONEPEZIL HYDROCHLORIDE 5 MG: 5 TABLET, FILM COATED ORAL at 21:38

## 2018-06-21 RX ADMIN — OXYCODONE HYDROCHLORIDE 5 MG: 5 TABLET ORAL at 12:47

## 2018-06-21 RX ADMIN — OXYCODONE HYDROCHLORIDE 5 MG: 5 TABLET ORAL at 19:46

## 2018-06-21 RX ADMIN — PANTOPRAZOLE SODIUM 40 MG: 40 TABLET, DELAYED RELEASE ORAL at 17:16

## 2018-06-21 RX ADMIN — QUETIAPINE FUMARATE 25 MG: 25 TABLET, FILM COATED ORAL at 21:38

## 2018-06-21 RX ADMIN — MEMANTINE HYDROCHLORIDE 5 MG: 5 TABLET ORAL at 17:16

## 2018-06-21 RX ADMIN — OXYCODONE HYDROCHLORIDE 2.5 MG: 5 TABLET ORAL at 05:36

## 2018-06-21 RX ADMIN — VANCOMYCIN HYDROCHLORIDE 125 MG: 10 INJECTION, POWDER, LYOPHILIZED, FOR SOLUTION INTRAVENOUS at 09:12

## 2018-06-21 RX ADMIN — OXYCODONE HYDROCHLORIDE 2.5 MG: 5 TABLET ORAL at 09:14

## 2018-06-21 RX ADMIN — POLYETHYLENE GLYCOL 3350 17 G: 17 POWDER, FOR SOLUTION ORAL at 09:04

## 2018-06-21 RX ADMIN — OXYCODONE HYDROCHLORIDE 2.5 MG: 5 TABLET ORAL at 21:38

## 2018-06-21 RX ADMIN — SUCRALFATE 1000 MG: 1 SUSPENSION ORAL at 05:36

## 2018-06-21 NOTE — PROGRESS NOTES
Pt resting comfortably, Heparin gtt continued, turned and repositioned, 1:1 continued  Pugh remains in place, PO vanco until 6/25  Referrals put to [facility] and to be sent back to [place]

## 2018-06-21 NOTE — SOCIAL WORK
Cm received call from Elmer Woods at [facility] and clinical documents received however unable to get a hold of family to sign paperwork

## 2018-06-21 NOTE — PROGRESS NOTES
Conducted daily rounding with Dr Dajuan Mendieta of [address] [place] [address], plan of car reviewed

## 2018-06-21 NOTE — PROGRESS NOTES
Progress Note - Palliative & Supportive Care  Keren Lee  80 y o   female  PPHP 525/PPHP 525-01   MRN: [id number]  Encounter: [id number]     Assessment  Patient Active Problem List   Diagnosis    Arterial occlusion    Atrial fibrillation ([address] )    Noncompliance with treatment    Alzheimer's dementia with behavioral disturbance    Falls    Physical deconditioning    [place] (hard of hearing)    Melena    C  difficile diarrhea    Anemia    GI bleed    Hypertension    CKD (chronic kidney disease) stage 3, GFR 30-59 ml/min    GERD with esophagitis    PUD (peptic ulcer disease)    Weakness    History of ischemic left MCA stroke    SIRS (systemic inflammatory response syndrome) ([facility])       Plan:  Symptom management: pain of RLE, dementia w/ likely superimposed delirium, agitation              - oxycodone IR 2 5mg q 4h scheduled              - oxycodone IR 5 - 10mg q 2h PRN              - hydromorphone IV PRN breakthrough pain              - lorazepam 0 5mg q 6h PRN anxiety   - olanzapine 2 5mg ODT BID PRN agitation              - continue quetiapine HS              - continue 1:1 sitter   - continue heparin drip for comfort; will plan to d/c upon discharge and likely start aspirin     Scripts for hospice left on paper chart  Goals of Care:    - level 4 comfort care   - plan for hospice in nursing facility, CM arranging   - spoke over phone with grandson regarding update; he has received paperwork from hospice and will be completing - he is ok with patient discharging before he is back in town  History  Sleeping and appeared comfortable  Ongoing intermittent confusion  Intermittent pain of RLE  2 doses of PRN oxycodone in past 24h       Meds  all current active meds have been reviewed and current meds:   Current Facility-Administered Medications   Medication Dose Route Frequency    atenolol (TENORMIN) tablet 100 mg  100 mg Oral Daily    donepezil (ARICEPT) tablet 5 mg  5 mg Oral HS    heparin (porcine) 25,000 units in 250 mL infusion (premix)  3-30 Units/kg/hr (Order-Specific) Intravenous Titrated    HYDROmorphone (DILAUDID) injection 0 5 mg  0 5 mg Intravenous Q3H PRN    LORazepam (ATIVAN) tablet 0 5 mg  0 5 mg Oral Q6H PRN    memantine (NAMENDA) tablet 5 mg  5 mg Oral BID    OLANZapine (ZyPREXA ZYDIS) dispersible tablet 2 5 mg  2 5 mg Oral BID PRN    ondansetron (ZOFRAN) injection 4 mg  4 mg Intravenous Q6H PRN    oxyCODONE (ROXICODONE) immediate release tablet 10 mg  10 mg Oral Q2H PRN    oxyCODONE (ROXICODONE) IR tablet 2 5 mg  2 5 mg Oral Q4H    oxyCODONE (ROXICODONE) IR tablet 5 mg  5 mg Oral Q2H PRN    pantoprazole (PROTONIX) EC tablet 40 mg  40 mg Oral BID AC    polyethylene glycol (MIRALAX) packet 17 g  17 g Oral Daily    QUEtiapine (SEROquel) tablet 25 mg  25 mg Oral HS    saccharomyces boulardii (FLORASTOR) capsule 250 mg  250 mg Oral BID    senna-docusate sodium (SENOKOT S) 8 6-50 mg per tablet 2 tablet  2 tablet Oral BID    sucralfate (CARAFATE) oral suspension 1,000 mg  1,000 mg Oral Q6H [address]    vancomycin (VANCOCIN) oral solution 125 mg  125 mg Oral 4x Daily       No Known Allergies    Objective  Physical Exam   Constitutional: She is sleeping  She is easily aroused  Frail, elderly   Pulmonary/Chest: Effort normal    Musculoskeletal:   Right foot ruborous   Neurological: She is easily aroused  Psychiatric: Cognition and memory are impaired     Charlie Asher [facility]  Office number: [phone]

## 2018-06-21 NOTE — PROGRESS NOTES
[facility] Internal Medicine Progress Note  Patient: David Cordova 80 y o  female   MRN: [id number]  PCP: No primary care provider on file  Unit/Bed#: [facility] 525-01 Encounter: [id number]  Date Of Visit: 06/21/18    Assessment:    Principal Problem:    Arterial occlusion  Active Problems:    Atrial fibrillation (HCC)    Alzheimer's dementia with behavioral disturbance    Physical deconditioning    C  difficile diarrhea    History of ischemic left MCA stroke    SIRS (systemic inflammatory response syndrome) ([facility])      Plan:    · Right Lower Extremity Ischemia / PAD -   ? Heparin drip   Monitor for any bleeding given GI bleed on 5/27/2018   ? CTA study done 6/16/2018: Right common femoral artery and SFA occluded   Minimal reconstitution of isolated segments of the right popliteal artery   No significant right infrapopliteal arterial flow   Interval occlusion of the left SFA since the intervention of June 2017   Collateral reconstitution of severely diseased left popliteal artery   No continuous vessel left lower extremity runoff   Multiple collaterals   High-grade, greater than 75% ostial stenosis of single patent renal artery, bilaterally   Bilateral kidney atrophy, left worse than right     ? 6/19 - Family decided on comfort care  ? 6/20 - Will keep heparin GTT going while inpt per palliative for comfort  Will check daily PTT, even if adjustement to heparin GTT made  Although pt had recent GIB, no need to check CBC  ? Pain Control -   ? Scheduled tylenol and oxycodone  ? Oxycodone and dilaudid prn  · SIRS POA - treat PAD and treat the C-diff colitis (prehospital)   Monitor vitals and labs   Improved  · Essential Hypertension -   · Valsartan stopped 6/16/2018   Will stop the amlodipine as well  · Alzheimer Dementia with Behavioral Disturbances -   ? Continue Aricept and Namenda  ? For behaviors, continue seroquel qhs and PRN ativan  ? Palliative added prn Zyprexa ODT  ?  Patient is on a 1-1 observation which will be continued  · C-diff Colitis (POA) - continue on oral vancomycin   Due to complete treatment on 6/25/2018   Monitor BMs and hydration status  · History of Stroke -   ? Antiplatelet / Anticoagulation - heparin drip  ? Statin - None currently  · Atrial Fibrillation -   ? Rate control - Increased atenolol  ? Anticoagulation - heparin drip currently   Has had stroke before and also ambulatory dysfunction places a higher risk for bleed   Deemed by outpatient cardiologist to be poor candidate for anticoagulation due to advanced age and bleeding risk (GI bleed, falls, etc)  · GERD - PPI therapy   Monitor for symptoms  · Ambulatory dysfunction - PT / OT evaluations recommend acute inpatient rehab post discharge  However, on 6/19 , decision for hospice made  · Goals of Care -   ? 6/17/2018 - discussed goals of care with Constantino soto, on phone 6/18 in light of the limb ischemia in patient with known advanced dementia and advanced age   Options of surgery followed by rehab and wound care course vs  No surgery / Palliative care given  Richie Ahumada is unsure of which way to proceed and asks to be able to wait a week till he is back in town and able to discuss with her before coming up with a decision   While I expressed to him that we are not so urgently looking for that decision today, that the longer we wait for a decision (if surgery is decided upon) then the more damage that is likely to occur  Sky Doss will talk to him again tomorrow    ? 6/18/2018 - spoke to Constantino Hahn more about goals of care and that patient would be higher risk not just for surgery but for post operative complications and falls after surgery due to severe dementia   Discussed that a palliative care consult is two fold in that they can work to help treat pain symptoms but also review options for conservative care measures as well including comfort based care approach  Sean Morrisumada has not agreed to hospice, but would be interested to hear from palliative care team   ?  - Zev Yan decided on comfort care  ?  - plan to d/c pt back to long-term w/ hospice       VTE Pharmacologic Prophylaxis:   Pharmacologic: Heparin Drip  Mechanical VTE Prophylaxis in Place: Yes    Patient Centered Rounds: I have performed bedside rounds with nursing staff today  Discussions with Specialists or Other Care Team Provider: no    Education and Discussions with Family / Patient: grandson    Time Spent for Care: 30 minutes  More than 50% of total time spent on counseling and coordination of care as described above  Current Length of Stay: 5 day(s)    Current Patient Status: Inpatient   Certification Statement: The patient will continue to require additional inpatient hospital stay due to need to make sure long-term set up for hospice    Discharge Plan / Estimated Discharge Date: possibly tomorrow if brittany completes paperwork for hospice and Rachael ready w/ equipment    Code Status: Level 4 - Comfort Care      Subjective:   Pain controlled    Objective:     Vitals:   Temp (24hrs), Av 4 °F (37 4 °C), Min:99 4 °F (37 4 °C), Max:99 4 °F (37 4 °C)    HR:  [90] 90  Resp:  [18] 18  BP: (175)/(85) 175/85  SpO2:  [95 %] 95 %  Body mass index is 21 04 kg/m²  Input and Output Summary (last 24 hours): Intake/Output Summary (Last 24 hours) at 18 1632  Last data filed at 18 1307   Gross per 24 hour   Intake                0 ml   Output             1825 ml   Net            -1825 ml       Physical Exam:     Physical Exam   Constitutional: No distress  HENT:   Head: Normocephalic and atraumatic  Eyes: Conjunctivae and EOM are normal    Neck: Normal range of motion  Neck supple  Cardiovascular: Normal rate and regular rhythm  Pulmonary/Chest: Effort normal and breath sounds normal  She has no wheezes  She has no rales  Abdominal: Soft  Bowel sounds are normal  She exhibits no distension  There is no tenderness  Musculoskeletal: Normal range of motion   She exhibits no edema  Neurological: She is alert  Skin: Skin is warm and dry  She is not diaphoretic  Additional Data:     Labs:      Results from last 7 days  Lab Units 06/19/18  0523 06/18/18  0507 06/17/18  0437   WBC Thousand/uL 9 99 11 05* 15 56*   HEMOGLOBIN g/dL 10 3* 10 2* 11 4*   HEMATOCRIT % 32 8* 33 0* 35 4   PLATELETS Thousands/uL 245 251 328   NEUTROS PCT %  --   --  91*   LYMPHS PCT %  --   --  4*   LYMPHO PCT %  --  10*  --    MONOS PCT %  --   --  4   MONO PCT MAN %  --  4  --    EOS PCT %  --   --  0   EOSINO PCT MANUAL %  --  0  --        Results from last 7 days  Lab Units 06/19/18  0523   SODIUM mmol/L 140   POTASSIUM mmol/L 3 3*   CHLORIDE mmol/L 108   CO2 mmol/L 22   BUN mg/dL 14   CREATININE mg/dL 1 05   CALCIUM mg/dL 7 7*   GLUCOSE RANDOM mg/dL 91       Results from last 7 days  Lab Units 06/16/18  0040   INR  1 24*       * I Have Reviewed All Lab Data Listed Above  * Additional Pertinent Lab Tests Reviewed:  All [facility] Reviewed        Recent Cultures (last 7 days):           Last 24 Hours Medication List:     Current Facility-Administered Medications:  atenolol 100 mg Oral Daily Luis Apodaca DO    donepezil 5 mg Oral HS Lakisha Adan MD    heparin (porcine) 3-30 Units/kg/hr (Order-Specific) Intravenous Titrated Stella Parker,  Last Rate: 23 Units/kg/hr (06/21/18 5770)   HYDROmorphone 0 5 mg Intravenous Q3H PRN Stella Parker DO    LORazepam 0 5 mg Oral Q6H PRN SEA Patino    memantine 5 mg Oral BID Lakisha Adan MD    OLANZapine 2 5 mg Oral BID PRN Nanette Coles, SEA    ondansetron 4 mg Intravenous Q6H PRN Luis Apodaca DO    oxyCODONE 10 mg Oral Q2H PRN Nanette Coles, MARCIANP    oxyCODONE 2 5 mg Oral Q4H Nanette Coles, SEA    oxyCODONE 5 mg Oral Q2H PRN Nanette Coles, SEA    pantoprazole 40 mg Oral BID AC Lakisha Adan MD    polyethylene glycol 17 g Oral Daily SEA Patino    QUEtiapine 25 mg Oral HS Lakisha Adan MD saccharomyces boulardii 250 mg Oral BID Ida Frias MD    senna-docusate sodium 2 tablet Oral BID Laura Becker DO    sucralfate 1,000 mg Oral Q6H [address] Ida Frias MD    vancomycin 125 mg Oral 4x Daily Ida Frias MD         Today, Patient Was Seen By: Laura Becker DO    ** Please Note: This note has been constructed using a voice recognition system   **

## 2018-06-21 NOTE — CASE MANAGEMENT
Continued Stay Review    Date: 18 ACUTE MED SURG LEVEL OF CARE    Vital Signs: BP (!) 175/85   Pulse 90   Temp 99 4 °F (37 4 °C) (Oral)   Resp 18   Ht 5' 4" (1 626 m)   Wt 55 6 kg (122 lb 9 6 oz)   SpO2 95%   BMI 21 04 kg/m²     Temp (24hrs), Av 4 °F (37 4 °C), Min:99 4 °F (37 4 °C), Max:99 4 °F (37 4 °C)   HR:  [83-84] 83  BP: (106-170)/(75-78) 170/75  SpO2:  [93 %-94 %] 93 %  Body mass index is 21 04 kg/m²       Input and Output Summary (last 24 hours):   Last data filed at 18 0700    Gross per 24 hour   Intake           2901 5 ml   Output             3675 ml   Net           -773 5 ml       Regular House Diet    Continuous IV Infusions:   heparin (porcine) 3-30 Units/kg/hr (Order-Specific) Last Rate: 23 Units/kg/hr (18 9932)     Medications:   Scheduled Meds:   Current Facility-Administered Medications:  atenolol 100 mg Oral Daily Joyice Hunger, DO    donepezil 5 mg Oral HS Elana Duffy MD    heparin (porcine) 3-30 Units/kg/hr (Order-Specific) Intravenous Titrated Gucci Bahraini, DO Last Rate: 23 Units/kg/hr (18 7669)   HYDROmorphone 0 5 mg Intravenous Q3H PRN Gucci Bahraini, DO    LORazepam 0 5 mg Oral Q6H PRN Gracia Age, CRNP    memantine 5 mg Oral BID Elana Duffy MD    OLANZapine 2 5 mg Oral BID PRN Gracia Age, CRNP    ondansetron 4 mg Intravenous Q6H PRN Joyice Hunger, DO    oxyCODONE 10 mg Oral Q2H PRN Gracia Age, CRNP    oxyCODONE 2 5 mg Oral Q4H Gracia Age, CRNP    oxyCODONE 5 mg Oral Q2H PRN Gracia Age, CRNP    pantoprazole 40 mg Oral BID AC Elana Duffy MD    polyethylene glycol 17 g Oral Daily Gracia Age, MARCIANP    QUEtiapine 25 mg Oral HS MD isidro Georges 250 mg Oral BID Elana Duffy MD    senna-docusate sodium 2 tablet Oral BID Gucci Bahraini, DO    sucralfate 1,000 mg Oral Q6H [address] Elana Duffy MD    vancomycin 125 mg Oral 4x Daily Elana Duffy MD        PRN Meds:       HYDROmorphone    LORazepam   OLANZapine    ondansetron    oxyCODONE 5 mg q2hrs prn given x 3/ 24 hrs    oxyCODONE  10 mg q2hrs prn given x 1/ 24 hrs      LABS/Diagnostic Results:   Results from last 7 days  Lab Units 06/19/18  0523 06/18/18  0507 06/17/18  0437   WBC Thousand/uL 9 99 11 05* 15 56*   HEMOGLOBIN g/dL 10 3* 10 2* 11 4*   HEMATOCRIT % 32 8* 33 0* 35 4   PLATELETS Thousands/uL 245 251 328   NEUTROS PCT %  --   --  91*   LYMPHS PCT %  --   --  4*   LYMPHO PCT %  --  10*  --    MONOS PCT %  --   --  4   MONO PCT MAN %  --  4  --    EOS PCT %  --   --  0   EOSINO PCT MANUAL %  --  0  --        Results from last 7 days  Lab Units 06/19/18  0523   SODIUM mmol/L 140   POTASSIUM mmol/L 3 3*   CHLORIDE mmol/L 108   CO2 mmol/L 22   BUN mg/dL 14   CREATININE mg/dL 1 05   CALCIUM mg/dL 7 7*   GLUCOSE RANDOM mg/dL 91       Results from last 7 days  Lab Units 06/16/18  0040   INR   1 24*          Age/Sex: 80 y o  female       Assessment/Plan:  Assessment:   Principal Problem:    Arterial occlusion  Active Problems:    Atrial fibrillation ([facility])    Alzheimer's dementia with behavioral disturbance    Physical deconditioning    C  difficile diarrhea    History of ischemic left MCA stroke    SIRS (systemic inflammatory response syndrome) ([facility])      Plan:   · Right Lower Extremity Ischemia / PAD -   ? Heparin drip   Monitor for any bleeding given GI bleed on 5/27/2018   ? CTA study done 6/16/2018: Right common femoral artery and SFA occluded   Minimal reconstitution of isolated segments of the right popliteal artery   No significant right infrapopliteal arterial flow   Interval occlusion of the left SFA since the intervention of June 2017   Collateral reconstitution of severely diseased left popliteal artery   No continuous vessel left lower extremity runoff   Multiple collaterals   High-grade, greater than 75% ostial stenosis of single patent renal artery, bilaterally   Bilateral kidney atrophy, left worse than right     ?  6/19 - Family decided on comfort care  ? 6/20 - Will keep heparin GTT going while inpt per palliative for comfort  Will check daily PTT, even if adjustement to heparin GTT made  Although pt had recent GIB, no need to check CBC  ? Pain Control -   ? Scheduled tylenol and oxycodone  ? Oxycodone and dilaudid prn  · SIRS POA - treat PAD and treat the C-diff colitis (prehospital)   Monitor vitals and labs   Improved  · Essential Hypertension -   · Valsartan stopped 6/16/2018   Will stop the amlodipine as well  · Alzheimer Dementia with Behavioral Disturbances -   ? Continue Aricept and Namenda  ? For behaviors, continue seroquel qhs and PRN ativan  ? Palliative added prn Zyprexa ODT  ? Patient is on a 1-1 observation which will be continued  · C-diff Colitis (POA) - continue on oral vancomycin   Due to complete treatment on 6/25/2018   Monitor BMs and hydration status  · History of Stroke -   ? Antiplatelet / Anticoagulation - heparin drip  ? Statin - None currently  · Atrial Fibrillation -   ? Rate control - Increased atenolol  ? Anticoagulation - heparin drip currently   Has had stroke before and also ambulatory dysfunction places a higher risk for bleed   Deemed by outpatient cardiologist to be poor candidate for anticoagulation due to advanced age and bleeding risk (GI bleed, falls, etc)  · GERD - PPI therapy   Monitor for symptoms  · Ambulatory dysfunction - PT / OT evaluations recommend acute inpatient rehab post discharge  However, on 6/19 , decision for hospice made    · Goals of Care -   ? 6/17/2018 - discussed goals of care with Anastasiia soto, on phone 6/18 in light of the limb ischemia in patient with known advanced dementia and advanced age   Options of surgery followed by rehab and wound care course vs  No surgery / Palliative care given  [facility] is unsure of which way to proceed and asks to be able to wait a week till he is back in town and able to discuss with her before coming up with a decision   While I expressed to him that we are not so urgently looking for that decision today, that the longer we wait for a decision (if surgery is decided upon) then the more damage that is likely to occur   We will talk to him again tomorrow  ? 6/18/2018 - spoke to Meliton Sahu more about goals of care and that patient would be higher risk not just for surgery but for post operative complications and falls after surgery due to severe dementia   Discussed that a palliative care consult is two fold in that they can work to help treat pain symptoms but also review options for conservative care measures as well including comfort based care approach  Katlyn Oneil has not agreed to hospice, but would be interested to hear from palliative care team   ? 6/19 - Jesus Shoemaker decided on comfort care  ?  6/20 - plan to d/c pt back to [facility] w/ hospice      VTE Pharmacologic Prophylaxis:   Pharmacologic: Heparin Drip  Mechanical VTE Prophylaxis in Place: No     Current Length of Stay: 4 day(s)     Current Patient Status: Inpatient   Certification Statement: The patient will continue to require additional inpatient hospital stay due to need for d/c planning          Discharge Plan:   ANTICIPATE DISCHARGE BACK TO [facility]

## 2018-06-21 NOTE — SOCIAL WORK
Call received from [employer]  She spoke to grandson, Hannah Dolan, and emailed admission paperwork to him  When paperwork is received she can order DME for delivery to [place] [id number] for Friday, 6/22/18  Patient can be discharged back to [facility] Friday afternoon  Joaquina will call CM tomorrow to confirm above  Called [facility] and scheduled BLS transport for 1630 tomorrow

## 2018-06-22 VITALS
RESPIRATION RATE: 18 BRPM | HEIGHT: 64 IN | TEMPERATURE: 99.4 F | BODY MASS INDEX: 19.38 KG/M2 | HEART RATE: 90 BPM | WEIGHT: 113.54 LBS | OXYGEN SATURATION: 95 % | DIASTOLIC BLOOD PRESSURE: 85 MMHG | SYSTOLIC BLOOD PRESSURE: 175 MMHG

## 2018-06-22 LAB — APTT PPP: 72 SECONDS (ref 24–36)

## 2018-06-22 PROCEDURE — 85730 THROMBOPLASTIN TIME PARTIAL: CPT | Performed by: GENERAL PRACTICE

## 2018-06-22 PROCEDURE — 99238 HOSP IP/OBS DSCHRG MGMT 30/<: CPT | Performed by: GENERAL PRACTICE

## 2018-06-22 RX ORDER — OXYCODONE HYDROCHLORIDE 10 MG/1
10 TABLET ORAL ONCE
Status: CANCELLED | OUTPATIENT
Start: 2018-06-22

## 2018-06-22 RX ORDER — ATENOLOL 100 MG/1
100 TABLET ORAL DAILY
Refills: 0
Start: 2018-06-23

## 2018-06-22 RX ORDER — AMOXICILLIN 250 MG
2 CAPSULE ORAL 2 TIMES DAILY
Refills: 0
Start: 2018-06-22

## 2018-06-22 RX ORDER — POLYETHYLENE GLYCOL 3350 17 G/17G
17 POWDER, FOR SOLUTION ORAL DAILY
Qty: 14 EACH | Refills: 0
Start: 2018-06-23

## 2018-06-22 RX ADMIN — OXYCODONE HYDROCHLORIDE 2.5 MG: 5 TABLET ORAL at 10:59

## 2018-06-22 RX ADMIN — OXYCODONE HYDROCHLORIDE 5 MG: 5 TABLET ORAL at 15:48

## 2018-06-22 RX ADMIN — PANTOPRAZOLE SODIUM 40 MG: 40 TABLET, DELAYED RELEASE ORAL at 05:29

## 2018-06-22 RX ADMIN — OXYCODONE HYDROCHLORIDE 2.5 MG: 5 TABLET ORAL at 05:30

## 2018-06-22 RX ADMIN — HYDROMORPHONE HYDROCHLORIDE 0.5 MG: 1 INJECTION, SOLUTION INTRAMUSCULAR; INTRAVENOUS; SUBCUTANEOUS at 13:17

## 2018-06-22 RX ADMIN — HEPARIN SODIUM AND DEXTROSE 23.09 UNITS/KG/HR: 10000; 5 INJECTION INTRAVENOUS at 01:44

## 2018-06-22 NOTE — SOCIAL WORK
Call received from [address] that transport is changed to 1600 pickup time with [facility]  Called Joaquina with [facility] who reports DME is to be delivered this afternoon  Called Ani soto, and left VM to inform him of transport time back to [facility] today

## 2018-06-22 NOTE — PROGRESS NOTES
Pt  Seen and discharged by dr Villa Cortes    [place] [facility] [place] [facility] at 1600

## 2018-06-22 NOTE — PROGRESS NOTES
06/22/18 1500   Plan of Care   Comments Pt going to hospice  Assessment Completed by:  Other (Comment)  (comfort care)

## 2018-06-22 NOTE — NURSING NOTE
Discharge instructions and prescriptions faxed to parisa barrios pt   Transferred to the discharge [facility]

## 2018-06-22 NOTE — DISCHARGE INSTRUCTIONS
Hospice Care   WHAT YOU NEED TO KNOW:   What is hospice care? Hospice care focuses on relieving your symptoms and improving the quality of the last months of your life  Hospice care will be specific to your needs and the needs of your family  Care can be provided at home or in a hospital  It can also be provided in a specialized hospice facility or long-term care facility  Who provides hospice care? Hospice care is provided by a team trained in support and education related to death and dying  This team includes doctors, nurses, and social workers  It may also include home health aides, clergy, therapists, and trained volunteers  A team member will be available any time day or night to answer questions or help with problems  The hospice doctor and your healthcare provider will work together to manage your treatment  What do hospice care services include? · Treatment management  helps ease your symptoms, such as pain  This may be done using medicines or certain therapies  Equipment, a bed, or other medical supplies may be provided to help care for you  · Emotional and psychological care  is provided to help you, your family, and those close to you cope with their feelings  Regular meetings will be held to discuss your condition, your needs, and the needs of your loved ones  You and your family may join support groups or meet others in similar situations  · Respite care  provides your family and healthcare providers up to 5 days to rest from providing care  During this time, you will be cared for in a hospice facility, hospital, or long-term care facility  · Practical support  assists you and your family with concerns such as legal issues and  arrangements  Your  can help you find services that fit your needs and your family's needs  · Spiritual and cultural support  helps you and your family evaluate [employer] values and cultural beliefs   Thinking about values and beliefs may make it easier to understand and accept your condition  · Loss support  is provided to the family for about 1 year after death  The hospice care team will help your family through the process of grieving  Your loved ones will receive visits and phone calls, and may be referred to support groups  CARE AGREEMENT:   You have the right to help plan your care  Learn about your health condition and how it may be treated  Discuss treatment options with your caregivers to decide what care you want to receive  You always have the right to refuse treatment  The above information is an  only  It is not intended as medical advice for individual conditions or treatments  Talk to your doctor, nurse or pharmacist before following any medical regimen to see if it is safe and effective for you  © 2017 [address] [employer] Information is for End User's use only and may not be sold, redistributed or otherwise used for commercial purposes  All illustrations and images included in CareNotes® are the copyrighted property of [employer]  or Leonel Ortega

## 2018-06-22 NOTE — PROGRESS NOTES
Patient resting with 1:1 at bedside for patient safety  Turned and repositioned with skin care provided  Will continue to monitor; comfort measures utilized

## 2018-06-23 NOTE — DISCHARGE SUMMARY
Discharge Summary - [address] [facility]    Patient Information: Adore Merino 80 y o  female MRN: [id number]  Unit/Bed#: DISCHARGE POOL Encounter: [id number]    Discharging Physician / Practitioner: Jhon Martin DO  PCP: No primary care provider on file  Admission Date: 6/16/2018  Discharge Date: 06/22/18    Disposition:     [address] at [facility]/ [facility]    Reason for Admission: ischemic b/l legs    Discharge Diagnoses:     Principal Problem:    Arterial occlusion  Active Problems:    Atrial fibrillation (HCC)    Alzheimer's dementia with behavioral disturbance    Physical deconditioning    C  difficile diarrhea    History of ischemic left MCA stroke    SIRS (systemic inflammatory response syndrome) ([facility] [address] )  Resolved Problems:    * No resolved hospital problems  *      Consultations During Hospital Stay:  · Dr Brandon Jha - vascular  · Dr Shania Peralta - palliative    Procedures Performed:     RIGHT ILEO FEMORAL THROMBOEMBOLECTOMY   WITH COMPETION ARTERIOGRAM (Right)    Significant Findings / Test Results:     ? CTA study done 6/16/2018: Right common femoral artery and SFA occluded   Minimal reconstitution of isolated segments of the right popliteal artery   No significant right infrapopliteal arterial flow   Interval occlusion of the left SFA since the intervention of June 2017   Collateral reconstitution of severely diseased left popliteal artery   No continuous vessel left lower extremity runoff   Multiple collaterals   High-grade, greater than 75% ostial stenosis of single patent renal artery, bilaterally   Bilateral kidney atrophy, left worse than right       Incidental Findings:   · none     Test Results Pending at Discharge (will require follow up):   · none     Outpatient Tests Requested:  · none    Complications:  none    Hospital Course:     Adore Merino is a 80 y o  female patient who originally presented to the hospital on 6/16/2018 due to LE ischemia  PT had RLE pain  Thromboembolectomy done to possibly salvage foot, but determined pt would need AKA  Family did not want this and instead elected for comfort care  Heparin GTT maintained throughout hosp as it was a comfort measure    Condition at Discharge: poor     Discharge Day Visit / Exam:     Subjective:  Pt happy to be leaving hospital  Vitals: Blood Pressure: (!) 175/85 (06/21/18 0723)  Pulse: 90 (06/21/18 0723)  Temperature: 99 4 °F (37 4 °C) (06/21/18 0723)  Temp Source: Oral (06/21/18 0723)  Respirations: 18 (06/21/18 0723)  Height: 5' 4" (162 6 cm) (06/16/18 9814)  Weight - Scale: 51 5 kg (113 lb 8 6 oz) (06/22/18 0400)  SpO2: 95 % (06/21/18 0723)  Exam:   Physical Exam   Constitutional: No distress  HENT:   Head: Normocephalic and atraumatic  Eyes: Conjunctivae and EOM are normal    Neck: Normal range of motion  Neck supple  Cardiovascular: Normal rate and regular rhythm  Pulmonary/Chest: Effort normal  She has no wheezes  She has no rales  Abdominal: Soft  Bowel sounds are normal  She exhibits no distension  There is no tenderness  Musculoskeletal: Normal range of motion  She exhibits no edema  Neurological: She is alert  Skin: Skin is warm and dry  She is not diaphoretic  Discussion with Family: yesterday    Discharge instructions/Information to patient and family:   See after visit summary for information provided to patient and family  Provisions for Follow-Up Care:  See after visit summary for information related to follow-up care and any pertinent home health orders  Planned Readmission: no     Discharge Statement:  I spent 25 minutes discharging the patient  This time was spent on the day of discharge  I had direct contact with the patient on the day of discharge  Greater than 50% of the total time was spent examining patient, answering all patient questions, arranging and discussing plan of care with patient as well as directly providing post-discharge instructions    Additional time then spent on discharge activities  Discharge Medications:  See after visit summary for reconciled discharge medications provided to patient and family        ** Please Note: This note has been constructed using a voice recognition system **

## 2022-09-29 NOTE — SOCIAL WORK
Patient is confused and not able to respond to questions  Call placed to 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 Yanni Pimentel sending patient send will need prophylaxis with either rifampin Rocephin or ciprofloxacin.  Consideration for Rocephin 250 IM one-time dose.     Nick Guerrero MD  09/29/22 [id number]

## (undated) DEVICE — SUT SILK 4-0 18 IN A183H

## (undated) DEVICE — ADHESIVE SKN CLSR HISTOACRYL FLEX 0.5ML LF

## (undated) DEVICE — SUT MONOCRYL 3-0 SH 27 IN Y416H

## (undated) DEVICE — INTENDED FOR TISSUE SEPARATION, AND OTHER PROCEDURES THAT REQUIRE A SHARP SURGICAL BLADE TO PUNCTURE OR CUT.: Brand: BARD-PARKER ® CARBON RIB-BACK BLADES

## (undated) DEVICE — BAG DECANTER

## (undated) DEVICE — SYRINGE 1ML SLIP TIP

## (undated) DEVICE — DRAPE SURGIKIT SADDLE BAG

## (undated) DEVICE — FOGARTY ARTERIAL EMBOLECTOMY CATHETER 4F 80CM: Brand: FOGARTY

## (undated) DEVICE — SURGICEL 4 X 8

## (undated) DEVICE — GLOVE INDICATOR PI UNDERGLOVE SZ 8 BLUE

## (undated) DEVICE — STERILE FEM POP PACK: Brand: CARDINAL HEALTH

## (undated) DEVICE — BUTTON SWITCH PENCIL HOLSTER: Brand: VALLEYLAB

## (undated) DEVICE — TELFA NON-ADHERENT ABSORBENT DRESSING: Brand: TELFA

## (undated) DEVICE — IV CATH INTROCAN 18G X 1 1/4 SAFETY

## (undated) DEVICE — SUT SILK 2-0 18 IN A185H

## (undated) DEVICE — CHLORAPREP HI-LITE 26ML ORANGE

## (undated) DEVICE — SUT PROLENE 6-0 BV130 30 IN 8709H

## (undated) DEVICE — 1200CC GUARDIAN II: Brand: GUARDIAN

## (undated) DEVICE — FOGARTY ARTERIAL EMBOLECTOMY CATHETER 3F 80CM: Brand: FOGARTY

## (undated) DEVICE — SUT MONOCRYL 4-0 PS-2 18 IN Y496G

## (undated) DEVICE — 3M™ IOBAN™ 2 ANTIMICROBIAL INCISE DRAPE 6640EZ: Brand: IOBAN™ 2

## (undated) DEVICE — PROXIMATE PLUS MD MULTI-DIRECTIONAL RELEASE SKIN STAPLERS CONTAINS 35 STAINLESS STEEL STAPLES APPROXIMATE CLOSED DIMENSIONS: 6.9MM X 3.9MM WIDE: Brand: PROXIMATE

## (undated) DEVICE — REM POLYHESIVE ADULT PATIENT RETURN ELECTRODE: Brand: VALLEYLAB

## (undated) DEVICE — GLOVE SRG BIOGEL 7.5

## (undated) DEVICE — 3000CC GUARDIAN II: Brand: GUARDIAN

## (undated) DEVICE — SNAP KOVER: Brand: UNBRANDED

## (undated) DEVICE — ULTRACLEAN ACCESSORY ELECTRODE 1" (2.54 CM) COATED BLADE: Brand: ULTRACLEAN

## (undated) DEVICE — MICROPUNCTURE 401

## (undated) DEVICE — DRESSING MEPILEX AG BORDER 4 X 8 IN

## (undated) DEVICE — FOGARTY ARTERIAL EMBOLECTOMY CATHETER 2F 60CM: Brand: FOGARTY

## (undated) DEVICE — INTENDED FOR TISSUE SEPARATION, AND OTHER PROCEDURES THAT REQUIRE A SHARP SURGICAL BLADE TO PUNCTURE OR CUT.: Brand: BARD-PARKER SAFETY BLADES SIZE 15, STERILE

## (undated) DEVICE — LIGHT GLOVE GREEN